# Patient Record
Sex: MALE | Race: BLACK OR AFRICAN AMERICAN | NOT HISPANIC OR LATINO | Employment: OTHER | URBAN - METROPOLITAN AREA
[De-identification: names, ages, dates, MRNs, and addresses within clinical notes are randomized per-mention and may not be internally consistent; named-entity substitution may affect disease eponyms.]

---

## 2017-01-10 RX ORDER — PRAVASTATIN SODIUM 20 MG/1
TABLET ORAL
Qty: 90 TABLET | Refills: 3 | Status: SHIPPED | OUTPATIENT
Start: 2017-01-10 | End: 2018-01-22 | Stop reason: SDUPTHER

## 2017-02-06 ENCOUNTER — LAB VISIT (OUTPATIENT)
Dept: LAB | Facility: OTHER | Age: 82
End: 2017-02-06
Attending: INTERNAL MEDICINE
Payer: MEDICARE

## 2017-02-06 ENCOUNTER — OFFICE VISIT (OUTPATIENT)
Dept: INTERNAL MEDICINE | Facility: CLINIC | Age: 82
End: 2017-02-06
Payer: MEDICARE

## 2017-02-06 VITALS
DIASTOLIC BLOOD PRESSURE: 70 MMHG | HEART RATE: 62 BPM | OXYGEN SATURATION: 98 % | HEIGHT: 65 IN | SYSTOLIC BLOOD PRESSURE: 116 MMHG | WEIGHT: 196.88 LBS | BODY MASS INDEX: 32.8 KG/M2

## 2017-02-06 DIAGNOSIS — R73.03 PREDIABETES: ICD-10-CM

## 2017-02-06 DIAGNOSIS — N18.3 CKD (CHRONIC KIDNEY DISEASE), STAGE 3 (MODERATE): ICD-10-CM

## 2017-02-06 DIAGNOSIS — E78.00 PURE HYPERCHOLESTEROLEMIA: ICD-10-CM

## 2017-02-06 DIAGNOSIS — I10 ESSENTIAL HYPERTENSION: Primary | ICD-10-CM

## 2017-02-06 DIAGNOSIS — I10 ESSENTIAL HYPERTENSION: ICD-10-CM

## 2017-02-06 LAB
ALBUMIN SERPL BCP-MCNC: 3.7 G/DL
ALP SERPL-CCNC: 64 U/L
ALT SERPL W/O P-5'-P-CCNC: 9 U/L
ANION GAP SERPL CALC-SCNC: 7 MMOL/L
AST SERPL-CCNC: 15 U/L
BILIRUB SERPL-MCNC: 1.1 MG/DL
BUN SERPL-MCNC: 41 MG/DL
CALCIUM SERPL-MCNC: 9.4 MG/DL
CHLORIDE SERPL-SCNC: 107 MMOL/L
CHOLEST/HDLC SERPL: 3.5 {RATIO}
CO2 SERPL-SCNC: 26 MMOL/L
CREAT SERPL-MCNC: 2 MG/DL
EST. GFR  (AFRICAN AMERICAN): 34.7 ML/MIN/1.73 M^2
EST. GFR  (NON AFRICAN AMERICAN): 30 ML/MIN/1.73 M^2
GLUCOSE SERPL-MCNC: 77 MG/DL
HDL/CHOLESTEROL RATIO: 28.9 %
HDLC SERPL-MCNC: 142 MG/DL
HDLC SERPL-MCNC: 41 MG/DL
LDLC SERPL CALC-MCNC: 76 MG/DL
NONHDLC SERPL-MCNC: 101 MG/DL
POTASSIUM SERPL-SCNC: 4.6 MMOL/L
PROT SERPL-MCNC: 6.9 G/DL
SODIUM SERPL-SCNC: 140 MMOL/L
TRIGL SERPL-MCNC: 125 MG/DL

## 2017-02-06 PROCEDURE — 1126F AMNT PAIN NOTED NONE PRSNT: CPT | Mod: S$GLB,,, | Performed by: INTERNAL MEDICINE

## 2017-02-06 PROCEDURE — 3074F SYST BP LT 130 MM HG: CPT | Mod: S$GLB,,, | Performed by: INTERNAL MEDICINE

## 2017-02-06 PROCEDURE — 80061 LIPID PANEL: CPT

## 2017-02-06 PROCEDURE — 1157F ADVNC CARE PLAN IN RCRD: CPT | Mod: S$GLB,,, | Performed by: INTERNAL MEDICINE

## 2017-02-06 PROCEDURE — 36415 COLL VENOUS BLD VENIPUNCTURE: CPT

## 2017-02-06 PROCEDURE — 1160F RVW MEDS BY RX/DR IN RCRD: CPT | Mod: S$GLB,,, | Performed by: INTERNAL MEDICINE

## 2017-02-06 PROCEDURE — 99999 PR PBB SHADOW E&M-EST. PATIENT-LVL III: CPT | Mod: PBBFAC,,, | Performed by: INTERNAL MEDICINE

## 2017-02-06 PROCEDURE — 83036 HEMOGLOBIN GLYCOSYLATED A1C: CPT

## 2017-02-06 PROCEDURE — 99214 OFFICE O/P EST MOD 30 MIN: CPT | Mod: S$GLB,,, | Performed by: INTERNAL MEDICINE

## 2017-02-06 PROCEDURE — 3078F DIAST BP <80 MM HG: CPT | Mod: S$GLB,,, | Performed by: INTERNAL MEDICINE

## 2017-02-06 PROCEDURE — 80053 COMPREHEN METABOLIC PANEL: CPT

## 2017-02-06 PROCEDURE — 1159F MED LIST DOCD IN RCRD: CPT | Mod: S$GLB,,, | Performed by: INTERNAL MEDICINE

## 2017-02-06 NOTE — PROGRESS NOTES
"Subjective:       Patient ID: Viktor Townsend Sr. is a 83 y.o. male.    Chief Complaint: Hypertension    HPI Comments:   Pt here for f/u HTN and other issues.    He is feeling well.  No c/o.         Hypertension       Review of Systems   Constitutional: Negative.    HENT: Negative.    Eyes: Negative.    Respiratory: Negative.    Cardiovascular: Negative.    Gastrointestinal: Negative.    Genitourinary: Negative.    Musculoskeletal: Negative.    Skin: Negative.    Neurological: Negative.    Psychiatric/Behavioral: Negative.        Objective:       Vitals:    02/06/17 1134   BP: 116/70   Pulse: 62   SpO2: 98%   Weight: 89.3 kg (196 lb 13.9 oz)   Height: 5' 5" (1.651 m)     Physical Exam   Constitutional: He is oriented to person, place, and time. He appears well-developed and well-nourished. No distress.   HENT:   Head: Normocephalic and atraumatic.   Right Ear: Tympanic membrane, external ear and ear canal normal.   Left Ear: Tympanic membrane, external ear and ear canal normal.   Mouth/Throat: Oropharynx is clear and moist and mucous membranes are normal. No oropharyngeal exudate or posterior oropharyngeal erythema.   Eyes: Conjunctivae and EOM are normal. Pupils are equal, round, and reactive to light.   Neck: Normal range of motion. Neck supple. No thyromegaly present.   Cardiovascular: Normal rate, regular rhythm and normal heart sounds.  Exam reveals no gallop and no friction rub.    No murmur heard.  Pulmonary/Chest: Effort normal and breath sounds normal. No respiratory distress. He has no wheezes. He has no rhonchi. He has no rales.   Lymphadenopathy:     He has no cervical adenopathy.   Neurological: He is alert and oriented to person, place, and time.   Skin: He is not diaphoretic.       Assessment:       1. Essential hypertension    2. Prediabetes    3. Pure hypercholesterolemia    4. CKD (chronic kidney disease), stage 3 (moderate)        Plan:           HTN - At goal.  Cont current tx.    Pre-DM - Chk " new A1c.     HLD - Cont statin and check lipids.    CKD - Chk new Cr, lytes.  Cont ACEI.

## 2017-02-06 NOTE — MR AVS SNAPSHOT
"    [facility]  [address]  [place] [address]  Phone: [phone]  Fax: [phone]                  Viktor Patelsarah Collier   2017 11:40 AM   Office Visit    Description:  Male : 1933   Provider:  Aron Pham MD   Department:  [facility]           Reason for Visit     Hypertension           Diagnoses this Visit        Comments    Essential hypertension    -  Primary     Prediabetes         Pure hypercholesterolemia         CKD (chronic kidney disease), stage 3 (moderate)                To Do List           Goals (5 Years of Data)     None      Follow-Up and Disposition     Return in about 1 year (around 2018), or if symptoms worsen or fail to improve.      [facility]     [facility] On Call Nurse Care Line -  Assistance  Registered nurses in the [facility] provide clinical advisement, health education, appointment booking, and other advisory services.  Call for this free service at [phone].             Medications                Verify that the below list of medications is an accurate representation of the medications you are currently taking.  If none reported, the list may be blank. If incorrect, please contact your healthcare provider. Carry this list with you in case of emergency.           Current Medications     lisinopril-hydrochlorothiazide (PRINZIDE,ZESTORETIC) 20-25 mg Tab TAKE 1 TABLET BY MOUTH EVERY DAY    pravastatin (PRAVACHOL) 20 MG tablet TAKE 1 TABLET BY MOUTH ONCE DAILY           Clinical Reference Information           Your Vitals Were     BP Pulse Height Weight SpO2 BMI    116/70 62 5' 5" (1.651 m) 89.3 kg (196 lb 13.9 oz) 98% 32.76 kg/m2      Blood Pressure          Most Recent Value    BP  116/70      Allergies as of 2017     Oranges [[place]]      Immunizations Administered on Date of Encounter - 2017     None      Orders Placed During Today's Visit     Future Labs/Procedures Expected by " Expires    Comprehensive metabolic panel  2/6/2017 4/7/2018    Hemoglobin A1c  2/6/2017 4/7/2018    Lipid panel  2/6/2017 4/7/2018      [facility] Sign-Up     Activating your [facility] account is as easy as 1-2-3!     1) Visit [email or web address], select Sign Up Now, enter this activation code and your date of birth, then select Next.  [email or web address]  Expires: 3/23/2017 11:53 AM      2) Create a username and password to use when you visit [facility] in the future and select a security question in case you lose your password and select Next.    3) Enter your e-mail address and click Sign Up!    Additional Information  If you have questions, please e-mail [email or web address] or call [phone] to talk to our [facility] staff. Remember, [facility] is NOT to be used for urgent needs. For medical emergencies, dial 911.         Language Assistance Services     ATTENTION: Language assistance services are available, free of charge. Please call [phone].      ATENCIÓN: Si habla español, tiene a becerra disposición servicios gratuitos de asistencia lingüística. Llame al [phone].     CHÚ Ý: N?u b?n nói Ti?ng Vi?t, có các d?ch v? h? tr? ngôn ng? mi?n phí dành cho b?n. G?i s? [phone].         [facility] complies with applicable Federal civil rights laws and does not discriminate on the basis of race, color, national origin, age, disability, or sex.

## 2017-02-07 ENCOUNTER — TELEPHONE (OUTPATIENT)
Dept: INTERNAL MEDICINE | Facility: CLINIC | Age: 82
End: 2017-02-07

## 2017-02-07 DIAGNOSIS — N18.30 CKD (CHRONIC KIDNEY DISEASE) STAGE 3, GFR 30-59 ML/MIN: ICD-10-CM

## 2017-02-07 LAB
ESTIMATED AVG GLUCOSE: 123 MG/DL
HBA1C MFR BLD HPLC: 5.9 %

## 2017-02-07 NOTE — TELEPHONE ENCOUNTER
Patient informed of lab results and told PCP recommend ultrasound. Patient and his wife state they want to wait until after they meet with the oncologist. She also state she wants to check her insurance before having test done.

## 2017-02-23 ENCOUNTER — HOSPITAL ENCOUNTER (OUTPATIENT)
Dept: RADIOLOGY | Facility: OTHER | Age: 82
Discharge: HOME | End: 2017-02-23
Attending: INTERNAL MEDICINE
Payer: MEDICARE

## 2017-02-23 DIAGNOSIS — N18.30 CKD (CHRONIC KIDNEY DISEASE) STAGE 3, GFR 30-59 ML/MIN: ICD-10-CM

## 2017-02-23 PROCEDURE — 76770 US EXAM ABDO BACK WALL COMP: CPT | Mod: TC

## 2017-02-23 PROCEDURE — 76770 US EXAM ABDO BACK WALL COMP: CPT | Mod: 26,,, | Performed by: RADIOLOGY

## 2017-08-07 RX ORDER — LISINOPRIL AND HYDROCHLOROTHIAZIDE 20; 25 MG/1; MG/1
TABLET ORAL
Qty: 90 TABLET | Refills: 0 | Status: SHIPPED | OUTPATIENT
Start: 2017-08-07 | End: 2017-11-10 | Stop reason: SDUPTHER

## 2017-11-10 NOTE — TELEPHONE ENCOUNTER
----- Message from Khushi Chávez sent at 11/10/2017  1:41 PM CST -----  Contact: Patient herself  X  1st Request  _  2nd Request  _  3rd Request    Please refill the medication(s) listed below. Please call the patient when the prescription(s) is ready for  at this phone number   [phone]       Medication #1  LISINOPRIL - HYDROCHLOROTHIAZIDE  20 - 25 mg    Preferred Pharmacy: [facility] # [id number] - [place] - [address] [facility]# [phone]  /   Fax# [phone]

## 2017-11-13 RX ORDER — LISINOPRIL AND HYDROCHLOROTHIAZIDE 20; 25 MG/1; MG/1
1 TABLET ORAL DAILY
Qty: 90 TABLET | Refills: 0 | Status: SHIPPED | OUTPATIENT
Start: 2017-11-13 | End: 2018-01-22 | Stop reason: SDUPTHER

## 2017-12-12 ENCOUNTER — HOSPITAL ENCOUNTER (OUTPATIENT)
Dept: PREADMISSION TESTING | Facility: OTHER | Age: 82
Discharge: HOME | End: 2017-12-12
Attending: SPECIALIST
Payer: MEDICARE

## 2017-12-12 ENCOUNTER — ANESTHESIA EVENT (OUTPATIENT)
Dept: SURGERY | Facility: OTHER | Age: 82
End: 2017-12-12
Payer: MEDICARE

## 2017-12-12 VITALS
TEMPERATURE: 99 F | HEIGHT: 67 IN | HEART RATE: 68 BPM | OXYGEN SATURATION: 98 % | RESPIRATION RATE: 16 BRPM | SYSTOLIC BLOOD PRESSURE: 121 MMHG | WEIGHT: 200 LBS | BODY MASS INDEX: 31.39 KG/M2 | DIASTOLIC BLOOD PRESSURE: 65 MMHG

## 2017-12-12 LAB
ANION GAP SERPL CALC-SCNC: 8 MMOL/L
BUN SERPL-MCNC: 28 MG/DL
CALCIUM SERPL-MCNC: 9.6 MG/DL
CHLORIDE SERPL-SCNC: 106 MMOL/L
CO2 SERPL-SCNC: 25 MMOL/L
CREAT SERPL-MCNC: 1.8 MG/DL
EST. GFR  (AFRICAN AMERICAN): 39 ML/MIN/1.73 M^2
EST. GFR  (NON AFRICAN AMERICAN): 34 ML/MIN/1.73 M^2
GLUCOSE SERPL-MCNC: 99 MG/DL
HCT VFR BLD AUTO: 42 %
HGB BLD-MCNC: 13.5 G/DL
POTASSIUM SERPL-SCNC: 5.2 MMOL/L
SODIUM SERPL-SCNC: 139 MMOL/L

## 2017-12-12 PROCEDURE — 85014 HEMATOCRIT: CPT

## 2017-12-12 PROCEDURE — 36415 COLL VENOUS BLD VENIPUNCTURE: CPT

## 2017-12-12 PROCEDURE — 85018 HEMOGLOBIN: CPT

## 2017-12-12 PROCEDURE — 80048 BASIC METABOLIC PNL TOTAL CA: CPT

## 2017-12-12 RX ORDER — CALCIUM CARBONATE 200(500)MG
1 TABLET,CHEWABLE ORAL 3 TIMES DAILY PRN
COMMUNITY

## 2017-12-12 RX ORDER — SODIUM CHLORIDE, SODIUM LACTATE, POTASSIUM CHLORIDE, CALCIUM CHLORIDE 600; 310; 30; 20 MG/100ML; MG/100ML; MG/100ML; MG/100ML
INJECTION, SOLUTION INTRAVENOUS CONTINUOUS
Status: CANCELLED | OUTPATIENT
Start: 2017-12-12

## 2017-12-12 RX ORDER — LIDOCAINE HYDROCHLORIDE 10 MG/ML
1 INJECTION, SOLUTION EPIDURAL; INFILTRATION; INTRACAUDAL; PERINEURAL ONCE
Status: CANCELLED | OUTPATIENT
Start: 2017-12-12 | End: 2017-12-12

## 2017-12-12 RX ORDER — BISMUTH SUBSALICYLATE 262 MG/1
TABLET ORAL 2 TIMES DAILY PRN
COMMUNITY
End: 2018-04-16

## 2017-12-12 RX ORDER — FAMOTIDINE 20 MG/1
20 TABLET, FILM COATED ORAL
Status: CANCELLED | OUTPATIENT
Start: 2017-12-12 | End: 2017-12-12

## 2017-12-12 NOTE — DISCHARGE INSTRUCTIONS
PRE-ADMIT TESTING -  [phone]    [address]  [place] [facility]          Your surgery has been scheduled at [facility]. We are pleased to have the opportunity to serve you. For Further Information please call [phone].    On the day of surgery please report to the Information Desk on the 1st floor.    · CONTACT YOUR PHYSICIAN'S OFFICE THE DAY PRIOR TO YOUR SURGERY TO OBTAIN YOUR ARRIVAL TIME.     · The evening before surgery do not eat anything after 9 p.m. ( this includes hard candy, chewing gum and mints).  You may only have GATORADE, POWERADE AND WATER  from 9 p.m. until you leave your home.   DO NOT DRINK ANY LIQUIDS ON THE WAY TO THE HOSPITAL.      SPECIAL MEDICATION INSTRUCTIONS: TAKE medications checked off by the Anesthesiologist on your Medication List.    Angiogram Patients: Take medications as instructed by your physician, including aspirin.     Surgery Patients:    If you take ASPIRIN - Your PHYSICIAN/SURGEON will need to inform you IF/OR when you need to stop taking aspirin prior to your surgery.     Do Not take any medications containing IBUPROFEN.  Do Not Wear any make-up or dark nail polish   (especially eye make-up) to surgery. If you come to surgery with makeup on you will be required to remove the makeup or nail polish.    Do not shave your surgical area at least 5 days prior to your surgery. The surgical prep will be performed at the hospital according to Infection Control regulations.    Leave all valuables at home.   Do Not wear any jewelry or watches, including any metal in body piercings.  Contact Lens must be removed before surgery. Either do not wear the contact lens or bring a case and solution for storage.  Please bring a container for eyeglasses or dentures as required.  Bring any paperwork your physician has provided, such as consent forms,  history and physicals, doctor's orders, etc.   Bring comfortable clothes that are loose fitting to wear upon  discharge. Take into consideration the type of surgery being performed.  Maintain your diet as advised per your physician the day prior to surgery.      Adequate rest the night before surgery is advised.   Park in the Parking lot behind the hospital or in the [facility] across the street from the parking lot. Parking is complimentary.  If you will be discharged the same day as your procedure, please arrange for a responsible adult to drive you home or to accompany you if traveling by taxi.   YOU WILL NOT BE PERMITTED TO DRIVE OR TO LEAVE THE HOSPITAL ALONE AFTER SURGERY.   It is strongly recommended that you arrange for someone to remain with you for the first 24 hrs following your surgery.       Thank you for your cooperation.  The Staff of [facility].        Bathing Instructions                                                                 Please shower the evening before and morning of your procedure with    ANTIBACTERIAL SOAP. ( DIAL, etc )  Concentrate on the surgical area   for at least 3 minutes and rinse completely. Dry off as usual.   Do not use any deodorant, powder, body lotions, perfume, after shave or    cologne.

## 2017-12-12 NOTE — ANESTHESIA PREPROCEDURE EVALUATION
12/12/2017  Viktor Townsend Sr. is a 84 y.o., male.    Anesthesia Evaluation    I have reviewed the Patient Summary Reports.    I have reviewed the Nursing Notes.   I have reviewed the Medications.     Review of Systems  Anesthesia Hx:  Denies Family Hx of Anesthesia complications.   Denies Personal Hx of Anesthesia complications.   Social:  Non-Smoker    Hematology/Oncology:  Hematology Normal       -- Cancer in past history (prostate):    EENT/Dental:EENT/Dental Normal   Cardiovascular:   Exercise tolerance: good Hypertension hyperlipidemia    Pulmonary:  Pulmonary Normal    Renal/:   Chronic Renal Disease, CRI    Hepatic/GI:   Hiatal Hernia, GERD (prn otc rx)    Musculoskeletal:   Arthritis     Neurological:  Neurology Normal    Endocrine:  Endocrine Normal Past hx gout   Dermatological:  Skin Normal    Psych:  Psychiatric Normal           Physical Exam  General:  Well nourished    Airway/Jaw/Neck:  Airway Findings: Mouth Opening: Normal Mallampati: I  TM Distance: Normal, at least 6 cm  Jaw/Neck Findings:  Neck ROM: Extension Decreased, Mod.      Dental:  Dental Findings: Upper Dentures, Lower Dentures        Mental Status:  Mental Status Findings:  Cooperative, Alert and Oriented         Anesthesia Plan  Type of Anesthesia, risks & benefits discussed:  Anesthesia Type:  general  Patient's Preference:   Intra-op Monitoring Plan: standard ASA monitors  Intra-op Monitoring Plan Comments:   Post Op Pain Control Plan:   Post Op Pain Control Plan Comments:   Induction:   IV  Beta Blocker:         Informed Consent: Patient understands risks and agrees with Anesthesia plan.  Questions answered. Anesthesia consent signed with patient.  ASA Score: 3     Day of Surgery Review of History & Physical:    H&P update referred to the surgeon.     Anesthesia Plan Notes: Labs ordered today in [facility]        Ready For  Surgery From Anesthesia Perspective.

## 2017-12-14 ENCOUNTER — HOSPITAL ENCOUNTER (OUTPATIENT)
Facility: OTHER | Age: 82
Discharge: HOME | End: 2017-12-14
Attending: SPECIALIST | Admitting: SPECIALIST
Payer: MEDICARE

## 2017-12-14 ENCOUNTER — ANESTHESIA (OUTPATIENT)
Dept: SURGERY | Facility: OTHER | Age: 82
End: 2017-12-14
Payer: MEDICARE

## 2017-12-14 VITALS
BODY MASS INDEX: 31.39 KG/M2 | HEIGHT: 67 IN | SYSTOLIC BLOOD PRESSURE: 121 MMHG | HEART RATE: 67 BPM | WEIGHT: 200 LBS | OXYGEN SATURATION: 98 % | TEMPERATURE: 98 F | DIASTOLIC BLOOD PRESSURE: 76 MMHG | RESPIRATION RATE: 16 BRPM

## 2017-12-14 DIAGNOSIS — K40.90 LEFT INGUINAL HERNIA: Primary | ICD-10-CM

## 2017-12-14 PROCEDURE — 71000033 HC RECOVERY, INTIAL HOUR: Performed by: SPECIALIST

## 2017-12-14 PROCEDURE — 27201423 OPTIME MED/SURG SUP & DEVICES STERILE SUPPLY: Performed by: SPECIALIST

## 2017-12-14 PROCEDURE — 25000003 PHARM REV CODE 250: Performed by: ANESTHESIOLOGY

## 2017-12-14 PROCEDURE — C9290 INJ, BUPIVACAINE LIPOSOME: HCPCS | Performed by: SPECIALIST

## 2017-12-14 PROCEDURE — 36000706: Performed by: SPECIALIST

## 2017-12-14 PROCEDURE — 37000008 HC ANESTHESIA 1ST 15 MINUTES: Performed by: SPECIALIST

## 2017-12-14 PROCEDURE — 88304 TISSUE EXAM BY PATHOLOGIST: CPT | Performed by: PATHOLOGY

## 2017-12-14 PROCEDURE — 37000009 HC ANESTHESIA EA ADD 15 MINS: Performed by: SPECIALIST

## 2017-12-14 PROCEDURE — 25000003 PHARM REV CODE 250: Performed by: SPECIALIST

## 2017-12-14 PROCEDURE — 25000003 PHARM REV CODE 250: Performed by: NURSE ANESTHETIST, CERTIFIED REGISTERED

## 2017-12-14 PROCEDURE — C1781 MESH (IMPLANTABLE): HCPCS | Performed by: SPECIALIST

## 2017-12-14 PROCEDURE — 63600175 PHARM REV CODE 636 W HCPCS: Performed by: SPECIALIST

## 2017-12-14 PROCEDURE — 88304 TISSUE EXAM BY PATHOLOGIST: CPT | Mod: 26,,, | Performed by: PATHOLOGY

## 2017-12-14 PROCEDURE — 36000707: Performed by: SPECIALIST

## 2017-12-14 PROCEDURE — 63600175 PHARM REV CODE 636 W HCPCS: Performed by: NURSE ANESTHETIST, CERTIFIED REGISTERED

## 2017-12-14 PROCEDURE — 71000016 HC POSTOP RECOV ADDL HR: Performed by: SPECIALIST

## 2017-12-14 PROCEDURE — 71000015 HC POSTOP RECOV 1ST HR: Performed by: SPECIALIST

## 2017-12-14 DEVICE — MESH SOFT PROLENE WVN 3X6: Type: IMPLANTABLE DEVICE | Site: GROIN | Status: FUNCTIONAL

## 2017-12-14 RX ORDER — OXYCODONE HYDROCHLORIDE 5 MG/1
5 TABLET ORAL EVERY 4 HOURS PRN
Status: DISCONTINUED | OUTPATIENT
Start: 2017-12-14 | End: 2017-12-14 | Stop reason: HOSPADM

## 2017-12-14 RX ORDER — RAMELTEON 8 MG/1
8 TABLET ORAL NIGHTLY PRN
Status: DISCONTINUED | OUTPATIENT
Start: 2017-12-14 | End: 2017-12-14

## 2017-12-14 RX ORDER — HYDROCHLOROTHIAZIDE 25 MG/1
25 TABLET ORAL DAILY
Status: DISCONTINUED | OUTPATIENT
Start: 2017-12-14 | End: 2017-12-14 | Stop reason: HOSPADM

## 2017-12-14 RX ORDER — ACETAMINOPHEN 325 MG/1
650 TABLET ORAL EVERY 4 HOURS PRN
Status: DISCONTINUED | OUTPATIENT
Start: 2017-12-14 | End: 2017-12-14 | Stop reason: HOSPADM

## 2017-12-14 RX ORDER — PROPOFOL 10 MG/ML
VIAL (ML) INTRAVENOUS
Status: DISCONTINUED | OUTPATIENT
Start: 2017-12-14 | End: 2017-12-14

## 2017-12-14 RX ORDER — RAMELTEON 8 MG/1
8 TABLET ORAL NIGHTLY PRN
Status: DISCONTINUED | OUTPATIENT
Start: 2017-12-14 | End: 2017-12-14 | Stop reason: HOSPADM

## 2017-12-14 RX ORDER — FENTANYL CITRATE 50 UG/ML
25 INJECTION, SOLUTION INTRAMUSCULAR; INTRAVENOUS EVERY 5 MIN PRN
Status: DISCONTINUED | OUTPATIENT
Start: 2017-12-14 | End: 2017-12-14 | Stop reason: HOSPADM

## 2017-12-14 RX ORDER — ONDANSETRON 2 MG/ML
4 INJECTION INTRAMUSCULAR; INTRAVENOUS DAILY PRN
Status: DISCONTINUED | OUTPATIENT
Start: 2017-12-14 | End: 2017-12-14 | Stop reason: HOSPADM

## 2017-12-14 RX ORDER — ONDANSETRON 8 MG/1
8 TABLET, ORALLY DISINTEGRATING ORAL EVERY 8 HOURS PRN
Status: DISCONTINUED | OUTPATIENT
Start: 2017-12-14 | End: 2017-12-14

## 2017-12-14 RX ORDER — HYDROMORPHONE HYDROCHLORIDE 2 MG/ML
0.5 INJECTION, SOLUTION INTRAMUSCULAR; INTRAVENOUS; SUBCUTANEOUS
Status: DISCONTINUED | OUTPATIENT
Start: 2017-12-14 | End: 2017-12-14 | Stop reason: HOSPADM

## 2017-12-14 RX ORDER — HYDROMORPHONE HYDROCHLORIDE 2 MG/ML
0.5 INJECTION, SOLUTION INTRAMUSCULAR; INTRAVENOUS; SUBCUTANEOUS EVERY 4 HOURS PRN
Status: DISCONTINUED | OUTPATIENT
Start: 2017-12-14 | End: 2017-12-14 | Stop reason: HOSPADM

## 2017-12-14 RX ORDER — LIDOCAINE HCL/PF 100 MG/5ML
SYRINGE (ML) INTRAVENOUS
Status: DISCONTINUED | OUTPATIENT
Start: 2017-12-14 | End: 2017-12-14

## 2017-12-14 RX ORDER — DIPHENHYDRAMINE HYDROCHLORIDE 50 MG/ML
25 INJECTION INTRAMUSCULAR; INTRAVENOUS EVERY 4 HOURS PRN
Status: DISCONTINUED | OUTPATIENT
Start: 2017-12-14 | End: 2017-12-14 | Stop reason: HOSPADM

## 2017-12-14 RX ORDER — ACETAMINOPHEN 325 MG/1
650 TABLET ORAL EVERY 8 HOURS PRN
Status: DISCONTINUED | OUTPATIENT
Start: 2017-12-14 | End: 2017-12-14

## 2017-12-14 RX ORDER — DEXAMETHASONE SODIUM PHOSPHATE 4 MG/ML
INJECTION, SOLUTION INTRA-ARTICULAR; INTRALESIONAL; INTRAMUSCULAR; INTRAVENOUS; SOFT TISSUE
Status: DISCONTINUED | OUTPATIENT
Start: 2017-12-14 | End: 2017-12-14

## 2017-12-14 RX ORDER — SUCCINYLCHOLINE CHLORIDE 20 MG/ML
INJECTION INTRAMUSCULAR; INTRAVENOUS
Status: DISCONTINUED | OUTPATIENT
Start: 2017-12-14 | End: 2017-12-14

## 2017-12-14 RX ORDER — HYDROMORPHONE HYDROCHLORIDE 2 MG/ML
0.4 INJECTION, SOLUTION INTRAMUSCULAR; INTRAVENOUS; SUBCUTANEOUS EVERY 5 MIN PRN
Status: DISCONTINUED | OUTPATIENT
Start: 2017-12-14 | End: 2017-12-14 | Stop reason: HOSPADM

## 2017-12-14 RX ORDER — OXYCODONE HYDROCHLORIDE 5 MG/1
5 TABLET ORAL
Status: DISCONTINUED | OUTPATIENT
Start: 2017-12-14 | End: 2017-12-14 | Stop reason: HOSPADM

## 2017-12-14 RX ORDER — LIDOCAINE HYDROCHLORIDE 10 MG/ML
1 INJECTION, SOLUTION EPIDURAL; INFILTRATION; INTRACAUDAL; PERINEURAL ONCE
Status: DISCONTINUED | OUTPATIENT
Start: 2017-12-14 | End: 2017-12-14

## 2017-12-14 RX ORDER — SODIUM CHLORIDE, SODIUM LACTATE, POTASSIUM CHLORIDE, CALCIUM CHLORIDE 600; 310; 30; 20 MG/100ML; MG/100ML; MG/100ML; MG/100ML
INJECTION, SOLUTION INTRAVENOUS CONTINUOUS
Status: DISCONTINUED | OUTPATIENT
Start: 2017-12-14 | End: 2017-12-14

## 2017-12-14 RX ORDER — MIDAZOLAM HYDROCHLORIDE 1 MG/ML
INJECTION INTRAMUSCULAR; INTRAVENOUS
Status: DISCONTINUED | OUTPATIENT
Start: 2017-12-14 | End: 2017-12-14

## 2017-12-14 RX ORDER — ROCURONIUM BROMIDE 10 MG/ML
INJECTION, SOLUTION INTRAVENOUS
Status: DISCONTINUED | OUTPATIENT
Start: 2017-12-14 | End: 2017-12-14

## 2017-12-14 RX ORDER — ACETAMINOPHEN 325 MG/1
650 TABLET ORAL EVERY 8 HOURS PRN
Status: DISCONTINUED | OUTPATIENT
Start: 2017-12-14 | End: 2017-12-14 | Stop reason: HOSPADM

## 2017-12-14 RX ORDER — ACETAMINOPHEN 325 MG/1
325 TABLET ORAL EVERY 6 HOURS PRN
COMMUNITY

## 2017-12-14 RX ORDER — SODIUM CHLORIDE 0.9 % (FLUSH) 0.9 %
3 SYRINGE (ML) INJECTION
Status: DISCONTINUED | OUTPATIENT
Start: 2017-12-14 | End: 2017-12-14 | Stop reason: HOSPADM

## 2017-12-14 RX ORDER — GLYCOPYRROLATE 0.2 MG/ML
INJECTION INTRAMUSCULAR; INTRAVENOUS
Status: DISCONTINUED | OUTPATIENT
Start: 2017-12-14 | End: 2017-12-14

## 2017-12-14 RX ORDER — SODIUM CHLORIDE 0.9 % (FLUSH) 0.9 %
3 SYRINGE (ML) INJECTION EVERY 8 HOURS
Status: DISCONTINUED | OUTPATIENT
Start: 2017-12-14 | End: 2017-12-14

## 2017-12-14 RX ORDER — LISINOPRIL 20 MG/1
20 TABLET ORAL DAILY
Status: DISCONTINUED | OUTPATIENT
Start: 2017-12-14 | End: 2017-12-14 | Stop reason: HOSPADM

## 2017-12-14 RX ORDER — NEOSTIGMINE METHYLSULFATE 1 MG/ML
INJECTION, SOLUTION INTRAVENOUS
Status: DISCONTINUED | OUTPATIENT
Start: 2017-12-14 | End: 2017-12-14

## 2017-12-14 RX ORDER — DIPHENHYDRAMINE HYDROCHLORIDE 50 MG/ML
25 INJECTION INTRAMUSCULAR; INTRAVENOUS EVERY 4 HOURS PRN
Status: DISCONTINUED | OUTPATIENT
Start: 2017-12-14 | End: 2017-12-14

## 2017-12-14 RX ORDER — ACETAMINOPHEN 10 MG/ML
INJECTION, SOLUTION INTRAVENOUS
Status: DISCONTINUED | OUTPATIENT
Start: 2017-12-14 | End: 2017-12-14

## 2017-12-14 RX ORDER — OXYCODONE AND ACETAMINOPHEN 7.5; 325 MG/1; MG/1
1 TABLET ORAL EVERY 4 HOURS PRN
Qty: 40 TABLET | Refills: 0 | Status: SHIPPED | OUTPATIENT
Start: 2017-12-14 | End: 2018-04-16

## 2017-12-14 RX ORDER — CEFAZOLIN SODIUM 2 G/50ML
2 SOLUTION INTRAVENOUS
Status: DISCONTINUED | OUTPATIENT
Start: 2017-12-14 | End: 2017-12-14

## 2017-12-14 RX ORDER — FAMOTIDINE 20 MG/1
20 TABLET, FILM COATED ORAL
Status: COMPLETED | OUTPATIENT
Start: 2017-12-14 | End: 2017-12-14

## 2017-12-14 RX ORDER — FENTANYL CITRATE 50 UG/ML
INJECTION, SOLUTION INTRAMUSCULAR; INTRAVENOUS
Status: DISCONTINUED | OUTPATIENT
Start: 2017-12-14 | End: 2017-12-14

## 2017-12-14 RX ORDER — ATROPINE SULFATE 1 MG/ML
INJECTION, SOLUTION INTRAMUSCULAR; INTRAVENOUS; SUBCUTANEOUS
Status: DISCONTINUED | OUTPATIENT
Start: 2017-12-14 | End: 2017-12-14

## 2017-12-14 RX ORDER — MEPERIDINE HYDROCHLORIDE 50 MG/ML
12.5 INJECTION INTRAMUSCULAR; INTRAVENOUS; SUBCUTANEOUS ONCE AS NEEDED
Status: DISCONTINUED | OUTPATIENT
Start: 2017-12-14 | End: 2017-12-14 | Stop reason: HOSPADM

## 2017-12-14 RX ORDER — ONDANSETRON 2 MG/ML
4 INJECTION INTRAMUSCULAR; INTRAVENOUS EVERY 8 HOURS PRN
Status: DISCONTINUED | OUTPATIENT
Start: 2017-12-14 | End: 2017-12-14 | Stop reason: HOSPADM

## 2017-12-14 RX ORDER — SODIUM CHLORIDE 0.9 % (FLUSH) 0.9 %
3 SYRINGE (ML) INJECTION EVERY 8 HOURS
Status: DISCONTINUED | OUTPATIENT
Start: 2017-12-14 | End: 2017-12-14 | Stop reason: HOSPADM

## 2017-12-14 RX ORDER — ONDANSETRON 8 MG/1
8 TABLET, ORALLY DISINTEGRATING ORAL EVERY 8 HOURS PRN
Status: DISCONTINUED | OUTPATIENT
Start: 2017-12-14 | End: 2017-12-14 | Stop reason: HOSPADM

## 2017-12-14 RX ORDER — OXYCODONE HYDROCHLORIDE 5 MG/1
5 TABLET ORAL EVERY 4 HOURS PRN
Status: DISCONTINUED | OUTPATIENT
Start: 2017-12-14 | End: 2017-12-14 | Stop reason: SDUPTHER

## 2017-12-14 RX ORDER — BUPIVACAINE HCL/EPINEPHRINE 0.25-.0005
VIAL (ML) INJECTION
Status: DISCONTINUED | OUTPATIENT
Start: 2017-12-14 | End: 2017-12-14 | Stop reason: HOSPADM

## 2017-12-14 RX ADMIN — GLYCOPYRROLATE 0.6 MG: 0.2 INJECTION, SOLUTION INTRAMUSCULAR; INTRAVENOUS at 01:12

## 2017-12-14 RX ADMIN — PROPOFOL 150 MG: 10 INJECTION, EMULSION INTRAVENOUS at 11:12

## 2017-12-14 RX ADMIN — OXYCODONE HYDROCHLORIDE 5 MG: 5 TABLET ORAL at 01:12

## 2017-12-14 RX ADMIN — NEOSTIGMINE METHYLSULFATE 4 MG: 1 INJECTION INTRAVENOUS at 01:12

## 2017-12-14 RX ADMIN — CARBOXYMETHYLCELLULOSE SODIUM 2 DROP: 2.5 SOLUTION/ DROPS OPHTHALMIC at 11:12

## 2017-12-14 RX ADMIN — ROCURONIUM BROMIDE 5 MG: 10 INJECTION, SOLUTION INTRAVENOUS at 11:12

## 2017-12-14 RX ADMIN — CEFAZOLIN SODIUM 2 G: 2 SOLUTION INTRAVENOUS at 12:12

## 2017-12-14 RX ADMIN — ROCURONIUM BROMIDE 20 MG: 10 INJECTION, SOLUTION INTRAVENOUS at 12:12

## 2017-12-14 RX ADMIN — SUCCINYLCHOLINE CHLORIDE 120 MG: 20 INJECTION, SOLUTION INTRAMUSCULAR; INTRAVENOUS at 11:12

## 2017-12-14 RX ADMIN — LIDOCAINE HYDROCHLORIDE 75 MG: 20 INJECTION, SOLUTION INTRAVENOUS at 11:12

## 2017-12-14 RX ADMIN — DEXAMETHASONE SODIUM PHOSPHATE 4 MG: 4 INJECTION, SOLUTION INTRAMUSCULAR; INTRAVENOUS at 12:12

## 2017-12-14 RX ADMIN — ACETAMINOPHEN 1000 MG: 10 INJECTION, SOLUTION INTRAVENOUS at 12:12

## 2017-12-14 RX ADMIN — FAMOTIDINE 20 MG: 20 TABLET, FILM COATED ORAL at 08:12

## 2017-12-14 RX ADMIN — ATROPINE SULFATE 0.4 MG: 1 INJECTION, SOLUTION INTRAMUSCULAR; INTRAVENOUS; SUBCUTANEOUS at 12:12

## 2017-12-14 RX ADMIN — FENTANYL CITRATE 50 MCG: 50 INJECTION, SOLUTION INTRAMUSCULAR; INTRAVENOUS at 11:12

## 2017-12-14 RX ADMIN — FENTANYL CITRATE 50 MCG: 50 INJECTION, SOLUTION INTRAMUSCULAR; INTRAVENOUS at 01:12

## 2017-12-14 RX ADMIN — EPHEDRINE SULFATE 10 MG: 50 INJECTION INTRAMUSCULAR; INTRAVENOUS; SUBCUTANEOUS at 12:12

## 2017-12-14 RX ADMIN — MIDAZOLAM HYDROCHLORIDE 1 MG: 1 INJECTION, SOLUTION INTRAMUSCULAR; INTRAVENOUS at 11:12

## 2017-12-14 RX ADMIN — SODIUM CHLORIDE, SODIUM LACTATE, POTASSIUM CHLORIDE, AND CALCIUM CHLORIDE: 600; 310; 30; 20 INJECTION, SOLUTION INTRAVENOUS at 10:12

## 2017-12-14 NOTE — BRIEF OP NOTE
[facility]  Brief Operative Note     SUMMARY     Surgery Date: 12/14/2017     Surgeon(s) and Role:     * Avery Melton Jr., MD - Primary    Assisting Surgeon: None    Pre-op Diagnosis:  Hernia, inguinal, left [K40.90]    Post-op Diagnosis:  Post-Op Diagnosis Codes:     * Hernia, inguinal, left [K40.90]    Procedure(s) (LRB):  REPAIR-HERNIA-INGUINAL-INITIAL (5 YRS+) (Left)    Anesthesia: General    Description of the findings of the procedure: sliding LIH/colon    Findings/Key Components: above, + lipoma of cord    Estimated Blood Loss: min         Specimens:   Specimen (12h ago through future)    Start     Ordered    12/14/17 1233  Specimen to Pathology - Surgery  Once     Comments:  Left inguinal cord lipoma      12/14/17 1233          Discharge Note    SUMMARY     Admit Date: 12/14/2017    Discharge Date and Time:  12/14/2017 1:02 PM    Hospital Course (synopsis of major diagnoses, care, treatment, and services provided during the course of the hospital stay): benign     Final Diagnosis: Post-Op Diagnosis Codes:     * Hernia, inguinal, left [K40.90]    Disposition: Home or Self Care    Follow Up/Patient Instructions:     Medications:  Reconciled Home Medications:   Current Discharge Medication List      START taking these medications    Details   oxyCODONE-acetaminophen (PERCOCET) 7.5-325 mg per tablet Take 1 tablet by mouth every 4 (four) hours as needed for Pain.  Qty: 40 tablet, Refills: 0         CONTINUE these medications which have NOT CHANGED    Details   acetaminophen (TYLENOL) 325 MG tablet Take 325 mg by mouth every 6 (six) hours as needed for Pain.      bismuth subsalicylate (BISMUTH) 262 mg Chew Take by mouth 2 (two) times daily as needed.      calcium carbonate (TUMS) 200 mg calcium (500 mg) chewable tablet Take 1 tablet by mouth 3 (three) times daily as needed for Heartburn.      lisinopril-hydrochlorothiazide (PRINZIDE,ZESTORETIC) 20-25 mg Tab Take 1 tablet by mouth once  daily.  Qty: 90 tablet, Refills: 0      pravastatin (PRAVACHOL) 20 MG tablet TAKE 1 TABLET BY MOUTH ONCE DAILY  Qty: 90 tablet, Refills: 3             Discharge Procedure Orders  Diet general     Activity as tolerated     Call MD for:  persistent nausea and vomiting     Call MD for:  severe uncontrolled pain     Call MD for:  redness, tenderness, or signs of infection (pain, swelling, redness, odor or green/yellow discharge around incision site)       Follow-up Information     Follow up In 10 days.

## 2017-12-14 NOTE — H&P (VIEW-ONLY)
"History & Physical    SUBJECTIVE:     History of Present Illness:  Patient is a 84 y.o. male with symptomatic reducible left inguinal hernia.       Review of patient's allergies indicates:   Allergen Reactions    Oranges [orange] Shortness Of Breath       Current Outpatient Prescriptions   Medication Sig Dispense Refill    lisinopril-hydrochlorothiazide (PRINZIDE,ZESTORETIC) 20-25 mg Tab Take 1 tablet by mouth once daily. 90 tablet 0    pravastatin (PRAVACHOL) 20 MG tablet TAKE 1 TABLET BY MOUTH ONCE DAILY 90 tablet 3     No current facility-administered medications for this visit.        Past Medical History:   Diagnosis Date    Hypertension      Past Surgical History:   Procedure Laterality Date    CHOLECYSTECTOMY      PROSTATE SURGERY       Family History   Problem Relation Age of Onset    Hyperlipidemia Father     Hypertension Father     Diabetes Father      Social History   Substance Use Topics    Smoking status: Former Smoker    Smokeless tobacco: Not on file    Alcohol use No        Review of Systems:  Review of Systems   HENT: Negative.    Respiratory: Negative.    Cardiovascular: Negative.    Gastrointestinal: Negative.    Genitourinary: Negative.    Musculoskeletal: Negative.    Skin: Negative.        OBJECTIVE:     Vital Signs (Most Recent)  Pulse: 66 (12/06/17 1422)  BP: (!) 143/71 (12/06/17 1422)  5' 7" (1.702 m)  90.7 kg (200 lb)     Physical Exam:  Physical Exam   Constitutional: He is oriented to person, place, and time. He appears well-developed and well-nourished.   HENT:   Head: Normocephalic and atraumatic.   Eyes: EOM are normal.   Neck: Normal range of motion. Neck supple.   Cardiovascular: Normal rate, regular rhythm and normal heart sounds.    Pulmonary/Chest: Effort normal and breath sounds normal.   Abdominal: Soft. Bowel sounds are normal. A hernia is present. Hernia confirmed positive in the left inguinal area.   Reducible left inguinal hernia    Musculoskeletal: Normal range " of motion.   Neurological: He is alert and oriented to person, place, and time.   Skin: Skin is warm and dry.       Laboratory      Diagnostic Results:      ASSESSMENT/PLAN:     Left inguinal hernia     PLAN:Plan     Repair

## 2017-12-14 NOTE — PLAN OF CARE
Viktor Townsend Sr. has met all discharge criteria from Phase II. Vital Signs are stable, ambulating  without difficulty. Discharge instructions given, patient verbalized understanding. Discharged from facility via wheelchair in stable condition.

## 2017-12-14 NOTE — DISCHARGE INSTRUCTIONS
Anesthesia: After Your Surgery  Youve just had surgery. During surgery, you received medication called anesthesia to keep you comfortable and pain-free. After surgery, you may experience some pain or nausea. This is common. Here are some tips for feeling better and recovering after surgery.    Going home  Your doctor or nurse will show you how to take care of yourself when you go home. He or she will also answer your questions. Have an adult family member or friend drive you home. For the first 24 hours after your surgery:  · Do not drive or use heavy equipment.  · Do not make important decisions or sign legal documents.  · Avoid alcohol.  · Have someone stay with you, if needed. He or she can watch for problems and help keep you safe.  Be sure to keep all follow-up appointments with your doctor. And rest after your procedure for as long as your doctor tells you to.    Coping with pain  If you have pain after surgery, pain medication will help you feel better. Take it as directed, before pain becomes severe. Also, ask your doctor or pharmacist about other ways to control pain, such as with heat, ice, and relaxation. And follow any other instructions your surgeon or nurse gives you.    Tips for taking pain medication  To get the best relief possible, remember these points:  · Pain medications can upset your stomach. Taking them with a little food may help.  · Most pain relievers taken by mouth need at least 20 to 30 minutes to take effect.  · Taking medication on a schedule can help you remember to take it. Try to time your medication so that you can take it before beginning an activity, such as dressing, walking, or sitting down for dinner.  · Constipation is a common side effect of pain medications. Contact your doctor before taking any medications like laxatives or stool softeners to help relieve constipation. Also ask about any dietary restrictions, because drinking lots of fluids and eating foods like fruits  and vegetables that are high in fiber can also help. Remember, dont take laxatives unless your surgeon has prescribed them.  · Mixing alcohol and pain medication can cause dizziness and slow your breathing. It can even be fatal. Dont drink alcohol while taking pain medication.  · Pain medication can slow your reflexes. Dont drive or operate machinery while taking pain medication.  If your health care provider tells you to take acetaminophen to help relieve your pain, ask him or her how much you are supposed to take each day. (Acetaminophen is the generic name for Tylenol and other brand-name pain relievers.) Acetaminophen or other pain relievers may interact with your prescription medicines or other over-the-counter (OTC) drugs. Some prescription medications contain acetaminophen along with other active ingredients. Using both prescription and OTC acetaminophen for pain can cause you to overdose. The FDA recommends that you read the labels on your OTC medications carefully. This will help you to clearly understand the list of active ingredients, dosing instructions, and any warnings. It may also help you avoid taking too much acetaminophen. If you have questions or don't understand the information, ask your pharmacist or health care provider to explain it to you before you take the OTC medication.    Managing nausea  Some people have an upset stomach after surgery. This is often due to anesthesia, pain, pain medications, or the stress of surgery. The following tips will help you manage nausea and get good nutrition as you recover. If you were on a special diet before surgery, ask your doctor if you should follow it during recovery. These tips may help:  · Dont push yourself to eat. Your body will tell you when to eat and how much.  · Start off with clear liquids and soup. They are easier to digest.  · Progress to semi-solid foods (mashed potatoes, applesauce, and gelatin) as you feel ready.  · Slowly move to  solid foods. Dont eat fatty, rich, or spicy foods at first.  · Dont force yourself to have three large meals a day. Instead, eat smaller amounts more often.  · Take pain medications with a small amount of solid food, such as crackers or toast to avoid nausea.      Call your surgeon if  · You still have pain an hour after taking medication (it may not be strong enough).  · You feel too sleepy, dizzy, or groggy (medication may be too strong).  · You have side effects like nausea, vomiting, or skin changes (rash, itching, or hives).   © 9415-6433 [employer]. [address] [place] [address], [place] [address]. All rights reserved. This information is not intended as a substitute for professional medical care. Always follow your healthcare professional's instructions.                  Discharge Instructions for Open Hernia Repair  You had a procedure called open hernia repair. Also called a rupture, a hernia is a tear or weakness in the wall of the belly. This weakness may be present at birth. Or it can be caused by the wear and tear of daily living. Hernias may get worse with time or with physical stress. But surgery can help repair the weakness and eliminate symptoms.  Activity after surgery  Recommendations include the following:  · After surgery, take it easy for the rest of the day. If you had general anesthesia, dont use machinery or power tools, drink alcohol, or make any major decisions for at least the first 24 hours.  · Dont drive while you are still taking opioid pain medicine, and dont drive until you are able to step firmly on the brake pedal without hesitation.  · Ask others to help with chores and errands while you recover.  · Dont lift anything heavier than 10 pounds until your healthcare provider says its OK.  · Dont mow the lawn, use a vacuum , or do other strenuous activities until your healthcare provider says its OK.  · Walk as often as you feel able.  · Continue the coughing  and deep breathing exercises that you learned in the hospital.  · Ask your healthcare provider when you can expect to return to work.  · Avoid constipation:  ¨ Eat fruits, vegetables, and whole grains.  ¨ Drink 6 to 8 glasses of water a day, unless otherwise instructed.  ¨ Use a laxative or a mild stool softener as instructed by your healthcare provider.  Bandage and incision care  Tips include the following:  · Do not get the bandage or wound wet for 48 hours.  · If strips of tape were used to close your incision, dont pull them off. Let them fall off on their own.  · Remove any gauze bandage in 48 hours.  · Wash your incision with mild soap and water. Pat it dry. Dont use oils, powders, or lotions on your incision. Do not soak your incision or take tub baths until cleared by your healthcare provider.  Follow-up care  Keep follow-up appointments during your recovery. These allow your healthcare provider to check your progress and make sure youre healing well. You may also need to have your stitches, staples, or bandage removed. During office visits, tell your healthcare provider if you have any new symptoms. And be sure to ask any questions you have.     When to call your healthcare provider  Call your healthcare provider immediately if you have any of the following:  · A large amount of swelling or bruising (some testicular swelling and bruising is common)  · Bleeding  · Increasing pain  · Increased redness or drainage of the incision  · Fever of 100.4°F (38.0°C) or higher, or as directed by your healthcare provider  · Trouble urinating  · Nausea or vomiting   Date Last Reviewed: 7/1/2016  © 5852-0624 [employer]. [address] [place] [address], [place] [address]. All rights reserved. This information is not intended as a substitute for professional medical care. Always follow your healthcare professional's instructions.

## 2017-12-14 NOTE — PROGRESS NOTES
[facility]  Brief Operative Note    SUMMARY     Surgery Date: 12/14/2017     Surgeon(s) and Role:     * Avery Melton Jr., MD - Primary    Assisting Surgeon: None    Pre-op Diagnosis:  Hernia, inguinal, left [K40.90]    Post-op Diagnosis:  Post-Op Diagnosis Codes:     * Hernia, inguinal, left [K40.90]    Procedure(s) (LRB):  REPAIR-HERNIA-INGUINAL-INITIAL (5 YRS+) (Left)    Anesthesia: General    Description of Procedure: lap jennifer    Description of the findings of the procedure: chronic cholecystitis    Estimated Blood Loss: 100cc         Specimens:   Specimen (12h ago through future)    None

## 2017-12-14 NOTE — ANESTHESIA POSTPROCEDURE EVALUATION
"Anesthesia Post Evaluation    Patient: Viktor Townsend .    Procedure(s) Performed: Procedure(s) (LRB):  REPAIR-HERNIA-INGUINAL-INITIAL (5 YRS+) (Left)    Final Anesthesia Type: general  Patient location during evaluation: PACU  Patient participation: Yes- Able to Participate  Level of consciousness: awake and alert  Post-procedure vital signs: reviewed and stable  Pain management: adequate  Airway patency: patent  PONV status at discharge: No PONV  Anesthetic complications: no      Cardiovascular status: blood pressure returned to baseline  Respiratory status: unassisted, spontaneous ventilation and room air  Hydration status: euvolemic  Follow-up not needed.        Visit Vitals  /73 (BP Location: Right arm, Patient Position: Lying)   Pulse 65   Temp 36.7 °C (98.1 °F) (Oral)   Resp 16   Ht 5' 7" (1.702 m)   Wt 90.7 kg (200 lb)   SpO2 98%   BMI 31.32 kg/m²       Pain/Anu Score: Pain Assessment Performed: Yes (12/14/2017  2:17 PM)  Presence of Pain: complains of pain/discomfort (12/14/2017  2:17 PM)  Pain Rating Prior to Med Admin: 3 (12/14/2017  1:46 PM)  Anu Score: 10 (12/14/2017  2:17 PM)  Modified Anu Score: 20 (12/14/2017  1:45 PM)      "

## 2017-12-14 NOTE — INTERVAL H&P NOTE
The patient has been examined and the H&P has been reviewed:    I concur with the findings and no changes have occurred since H&P was written.              Active Hospital Problems    Diagnosis  POA    Left inguinal hernia [K40.90]  Yes      Resolved Hospital Problems    Diagnosis Date Resolved POA   No resolved problems to display.

## 2017-12-15 NOTE — OP NOTE
DATE OF PROCEDURE:  12/14/2017.    PREOPERATIVE DIAGNOSIS:  Left inguinal hernia.    POSTOPERATIVE DIAGNOSIS:  Sliding left inguinal hernia, lipoma of cord.    PROCEDURE:  Open repair of sliding left inguinal hernia with removal of lipoma   of the cord.    PROCEDURE IN DETAIL:  The patient was brought to the Operating Room, placed in   the supine position.  The abdomen and groin were prepped and draped in a sterile   fashion.  A transverse incision was made over the inguinal canal, carried down   through the subcutaneous tissues.  Fascia of the external oblique opened along   the bias of the fibers.  Spermatic cord was identified and isolated.  There was   a large lipoma present on the cord.  Using blunt dissection, these were freed   from surrounding structures.  A high ligation was performed with 3-0 Vicryl.    The specimen was removed and sent for permanent section.  There was a sac   present along the cord making a part of the sac was the sigmoid colon.  The sac   was opened and then imbricated with 3-0 Vicryl and returned into the peritoneal   cavity.  The patient examined and had no evidence of femoral hernia.  A piece of   soft Prolene mesh cut to an appropriate size and configuration, sutured along   the iliopubic tract laterally and the conjoint tendon medially with 0 Ethibond   sutures.  The mesh was key-holed around the spermatic cord.  After completion of   reconstruction of floor of the inguinal canal, the cord was returned to its   normal anatomic position.  The external oblique was closed with interrupted 3-0   Vicryl, the subQ with interrupted 3-0 Vicryl, the skin with running 4-0   Monocryl.  The patient tolerated the procedure well and left the Operating Room   in good condition.  At the end of procedure, all sponge, lap and instrument   counts were correct.      LINDY  dd: 12/14/2017 13:05:06 (CST)  td: 12/14/2017 19:35:51 (CST)  Doc ID   #[id number]  Job ID #[id number]    CC:

## 2018-01-15 ENCOUNTER — TELEPHONE (OUTPATIENT)
Dept: INTERNAL MEDICINE | Facility: CLINIC | Age: 83
End: 2018-01-15

## 2018-01-15 NOTE — TELEPHONE ENCOUNTER
Pt and [email or web address] schedule for a NP appt on 01/22/ Pt wife agrees and does not have any further questions.

## 2018-01-15 NOTE — TELEPHONE ENCOUNTER
----- Message from Maria Samayoa sent at 1/15/2018 11:15 AM CST -----      _  1st Request  _  2nd Request  _  3rd Request    Please refill the medication(s) listed below. Please call the patient when the prescription(s) is ready for  at this phone number            Medication #1pravastatin (PRAVACHOL) 20 MG tablet    Pt has an appt with you on 2/6/18 Dr Vero orr and thought he had enough to make it to his appt    Preferred Pharmacy:  [facility] [address] [place] [address]

## 2018-01-22 ENCOUNTER — TELEPHONE (OUTPATIENT)
Dept: ORTHOPEDICS | Facility: CLINIC | Age: 83
End: 2018-01-22

## 2018-01-22 ENCOUNTER — TELEPHONE (OUTPATIENT)
Dept: OTOLARYNGOLOGY | Facility: CLINIC | Age: 83
End: 2018-01-22

## 2018-01-22 ENCOUNTER — OFFICE VISIT (OUTPATIENT)
Dept: INTERNAL MEDICINE | Facility: CLINIC | Age: 83
End: 2018-01-22
Attending: FAMILY MEDICINE
Payer: MEDICARE

## 2018-01-22 VITALS
BODY MASS INDEX: 30.56 KG/M2 | HEART RATE: 83 BPM | WEIGHT: 194.69 LBS | HEIGHT: 67 IN | OXYGEN SATURATION: 97 % | DIASTOLIC BLOOD PRESSURE: 60 MMHG | SYSTOLIC BLOOD PRESSURE: 122 MMHG

## 2018-01-22 DIAGNOSIS — M25.511 ACUTE PAIN OF RIGHT SHOULDER: ICD-10-CM

## 2018-01-22 DIAGNOSIS — N18.30 CKD (CHRONIC KIDNEY DISEASE) STAGE 3, GFR 30-59 ML/MIN: ICD-10-CM

## 2018-01-22 DIAGNOSIS — M25.511 ACUTE PAIN OF RIGHT SHOULDER: Primary | ICD-10-CM

## 2018-01-22 DIAGNOSIS — E78.00 PURE HYPERCHOLESTEROLEMIA: ICD-10-CM

## 2018-01-22 DIAGNOSIS — Z00.00 PREVENTATIVE HEALTH CARE: Primary | ICD-10-CM

## 2018-01-22 DIAGNOSIS — I10 ESSENTIAL HYPERTENSION: ICD-10-CM

## 2018-01-22 PROCEDURE — 99499 UNLISTED E&M SERVICE: CPT | Mod: S$GLB,,, | Performed by: FAMILY MEDICINE

## 2018-01-22 PROCEDURE — 99999 PR PBB SHADOW E&M-EST. PATIENT-LVL III: CPT | Mod: PBBFAC,,, | Performed by: FAMILY MEDICINE

## 2018-01-22 PROCEDURE — 99397 PER PM REEVAL EST PAT 65+ YR: CPT | Mod: S$GLB,,, | Performed by: FAMILY MEDICINE

## 2018-01-22 RX ORDER — LISINOPRIL AND HYDROCHLOROTHIAZIDE 20; 25 MG/1; MG/1
1 TABLET ORAL DAILY
Qty: 90 TABLET | Refills: 3 | Status: SHIPPED | OUTPATIENT
Start: 2018-01-22 | End: 2019-01-17 | Stop reason: SDUPTHER

## 2018-01-22 RX ORDER — PRAVASTATIN SODIUM 20 MG/1
20 TABLET ORAL DAILY
Qty: 90 TABLET | Refills: 3 | Status: SHIPPED | OUTPATIENT
Start: 2018-01-22 | End: 2019-01-07 | Stop reason: SDUPTHER

## 2018-01-22 NOTE — PROGRESS NOTES
CHIEF COMPLAINT: Establish a primary care physician    HISTORY OF PRESENT ILLNESS: The patient is a remarkably healthy 84-year-old black male.  The patient has no specific complaints today other than a 6 week history of right shoulder trauma.  He sustained blunt trauma to the right humeral head region about 6 weeks ago.  There is still a substantial amount of bruising.  He cannot raise his arm above 90°.    It has been a while since the patient has been seen.    The patient has a history of stable hypertension on current medications.  Patient denies chest pain or shortness of breath today.    The patient has a history of stable hyperlipidemia on current medications.  The patient denies chest pain or shortness of breath today.  The patient denies muscle aches or myalgias suggestive of myositis.    He has chronic kidney disease stage III.  He sees an outside nephrologist.    The patient was previously cared for by Dr. Pham.  The patient is establishing care with me today.    REVIEW OF SYSTEMS:  GENERAL: No fever, chills, fatigability or weight loss.  SKIN: No rashes, itching or changes in color or texture of skin.  HEAD: No headaches or recent head trauma.  EYES: Visual acuity fine. No photophobia, ocular pain or diplopia.  EARS: Denies ear pain, discharge or vertigo.  NOSE: No loss of smell, no epistaxis or postnasal drip.  MOUTH & THROAT: No hoarseness or change in voice. No excessive gum bleeding.  NODES: Denies swollen glands.  CHEST: Denies YUNG, cyanosis, wheezing, cough and sputum production.  CARDIOVASCULAR: Denies chest pain, PND, orthopnea or reduced exercise tolerance.  ABDOMEN: Appetite fine. No weight loss. Denies diarrhea, abdominal pain, hematemesis or blood in stool.  URINARY: No flank pain, dysuria or hematuria.  PERIPHERAL VASCULAR: No claudication or cyanosis.  MUSCULOSKELETAL: No joint stiffness or swelling. Denies back pain.except as mentioned above.  NEUROLOGIC: No history of seizures,  paralysis, alteration of gait or coordination.    SOCIAL HISTORY: The patient does not smoke.  The patient consumes alcohol socially.  The patient is happily  to another patient of mine.    PHYSICAL EXAMINATION:       APPEARANCE: Well nourished, well developed, in no acute distress.    HEAD: Normocephalic, atraumatic.  EYES: PERRL. EOMI.  Conjunctivae without injection and  anicteric  EARS: TM's intact. Light reflex normal. No retraction or perforation.    NOSE: Mucosa pink. Airway clear.  MOUTH & THROAT: No tonsillar enlargement. No pharyngeal erythema or exudate. No stridor.  NECK: Supple.   NODES: No cervical, axillary or inguinal lymph node enlargement.  CHEST: Lungs clear to auscultation.  No retractions are noted.  No rales or rhonchi are present.  CARDIOVASCULAR: Normal S1, S2. No rubs, murmurs or gallops.  ABDOMEN: Bowel sounds normal. Not distended. Soft. No tenderness or masses.  No ascites is noted.  MUSCULOSKELETAL:  There is no clubbing, cyanosis, or edema of the extremities x4.  There is full range of motion of the lumbar spine.  There is decreased range of motion of the right upper extremity.  There is no deformity noted.    NEUROLOGIC:       Normal speech development.      Hearing normal.      Normal gait.      Motor and sensory exams grossly normal.      DTR's normal.  PSYCHIATRIC: Patient is alert and oriented x3.  Thought processes are all normal.  There is no homicidality.  There is no suicidality.  There is no evidence of psychosis.    LABORATORY/RADIOLOGY:   Chart reviewed.      ASSESSMENT:   Normal physical exam in an apparently healthy individual  Acute right shoulder pain  Hypertension, condition is well controlled on current medication regimen  Hyperlipidemia, condition is well controlled on current medication regimen  Chronic kidney disease 3    PLAN:  Orthopedics referral  Medications refilled  Return to clinic in one year.

## 2018-01-25 ENCOUNTER — OFFICE VISIT (OUTPATIENT)
Dept: ORTHOPEDICS | Facility: CLINIC | Age: 83
End: 2018-01-25
Attending: FAMILY MEDICINE
Payer: MEDICARE

## 2018-01-25 ENCOUNTER — HOSPITAL ENCOUNTER (OUTPATIENT)
Dept: RADIOLOGY | Facility: OTHER | Age: 83
Discharge: HOME | End: 2018-01-25
Attending: PHYSICIAN ASSISTANT
Payer: MEDICARE

## 2018-01-25 VITALS
HEART RATE: 63 BPM | RESPIRATION RATE: 18 BRPM | DIASTOLIC BLOOD PRESSURE: 68 MMHG | HEIGHT: 67 IN | SYSTOLIC BLOOD PRESSURE: 122 MMHG | BODY MASS INDEX: 30.45 KG/M2 | WEIGHT: 194 LBS

## 2018-01-25 DIAGNOSIS — M25.511 ACUTE PAIN OF RIGHT SHOULDER: ICD-10-CM

## 2018-01-25 DIAGNOSIS — R29.898 WEAKNESS OF SHOULDER: ICD-10-CM

## 2018-01-25 DIAGNOSIS — M25.511 ACUTE PAIN OF RIGHT SHOULDER: Primary | ICD-10-CM

## 2018-01-25 PROCEDURE — 73030 X-RAY EXAM OF SHOULDER: CPT | Mod: TC,FY,RT

## 2018-01-25 PROCEDURE — 99999 PR PBB SHADOW E&M-EST. PATIENT-LVL IV: CPT | Mod: PBBFAC,,, | Performed by: PHYSICIAN ASSISTANT

## 2018-01-25 PROCEDURE — 73030 X-RAY EXAM OF SHOULDER: CPT | Mod: 26,RT,, | Performed by: RADIOLOGY

## 2018-01-25 PROCEDURE — 99213 OFFICE O/P EST LOW 20 MIN: CPT | Mod: S$GLB,,, | Performed by: PHYSICIAN ASSISTANT

## 2018-01-25 RX ORDER — DICLOFENAC SODIUM 10 MG/G
2 GEL TOPICAL 2 TIMES DAILY
Qty: 1 TUBE | Refills: 2 | Status: SHIPPED | OUTPATIENT
Start: 2018-01-25 | End: 2018-09-26

## 2018-01-25 NOTE — PROGRESS NOTES
"Subjective:      Patient ID: Viktor Townsend Sr. is a 84 y.o. male.    Chief Complaint: Pain of the Right Shoulder      HPI  Viktor Townsend Sr. is a right hand dominant 84 y.o. male presenting today for right shoulder pain.  There was a history of trauma.  Onset of symptoms began 1 month ago.  He was picking up some groceries, tripped, and fell onto the right shoulder.  He reports that the bruising has improved, he also says that his motion is improving, but he continues to have limited right shoulder motion.  He is using a "pain spray" which is providing some relief. He says that he is now able to sleep on the right side, but after the accident he was unable to sleep on that side.  He reports 4-5/10 pain with motion of the right shoulder.  He does take occasional Tylenol, which provides some relief.     Review of patient's allergies indicates:   Allergen Reactions    Oranges [orange] Shortness Of Breath         Current Outpatient Prescriptions   Medication Sig Dispense Refill    acetaminophen (TYLENOL) 325 MG tablet Take 325 mg by mouth every 6 (six) hours as needed for Pain.      bismuth subsalicylate (BISMUTH) 262 mg Chew Take by mouth 2 (two) times daily as needed.      calcium carbonate (TUMS) 200 mg calcium (500 mg) chewable tablet Take 1 tablet by mouth 3 (three) times daily as needed for Heartburn.      lisinopril-hydrochlorothiazide (PRINZIDE,ZESTORETIC) 20-25 mg Tab Take 1 tablet by mouth once daily. 90 tablet 3    oxyCODONE-acetaminophen (PERCOCET) 7.5-325 mg per tablet Take 1 tablet by mouth every 4 (four) hours as needed for Pain. 40 tablet 0    pravastatin (PRAVACHOL) 20 MG tablet Take 1 tablet (20 mg total) by mouth once daily. 90 tablet 3    diclofenac sodium 1 % Gel Apply 2 g topically 2 (two) times daily. Apply to shoulder in area of pain 1 Tube 2     No current facility-administered medications for this visit.        Past Medical History:   Diagnosis Date    CKD (chronic kidney disease)  " "   Colon polyps     Gout     Hiatal hernia     Hypertension     Lumbar back pain     Personal history of asbestosis        Past Surgical History:   Procedure Laterality Date    CHOLECYSTECTOMY      EYE SURGERY      PROSTATE SURGERY           Review of Systems:  Review of Systems   Constitution: Negative for chills and fever.   Skin: Negative for rash and suspicious lesions.   Musculoskeletal:        See HPI   Neurological: Negative for dizziness, headaches, light-headedness, numbness and paresthesias.   Psychiatric/Behavioral: Negative for depression. The patient is not nervous/anxious.          OBJECTIVE:     PHYSICAL EXAM:  Height: 5' 7" (170.2 cm) Weight: 88 kg (194 lb)     Vitals:    01/25/18 0843   BP: 122/68   Pulse: 63   Resp: 18     General    Vitals reviewed.  Constitutional: He is oriented to person, place, and time. He appears well-developed and well-nourished.   HENT:   Head: Normocephalic and atraumatic.   Neck: Normal range of motion.   Cardiovascular: Normal rate.    Pulmonary/Chest: Effort normal. No respiratory distress.   Neurological: He is alert and oriented to person, place, and time.   Psychiatric: He has a normal mood and affect. His behavior is normal. Judgment and thought content normal.             Musculoskeletal:  Very mild ecchymosis right upper arm and axillary aspect of right chest.  Nontender to palpation today.  No scars abrasions or lacerations.  Good elbow, wrist, and finger range of motion.  Active right shoulder range of motion is decreased, forward flexion to approximately 60°, abduction to approximately 60°.  IR to the level of L4, decreased ER and decreased adduction.  Passive range of motion of the right shoulder is showed full FF and abduction.  Right shoulder weakness noted.  Her vascular intact-good sensation and motor function, 2+ radial pulses.    RADIOGRAPHS:  Right Shoulder X-Ray, 1/25/2018  AP, scapular Y, and axillary radiographs of the right shoulder were " obtained.  The bones are intact.  There is no evidence for acute fracture or bone destruction.  There is no evidence for dislocation.  There are mild degenerative changes of the acromioclavicular joint identified.  Soft tissues are unremarkable.   Impression   No evidence for acute fracture, bone destruction, or dislocation.  Mild degenerative changes.     Comments: I have personally reviewed the imaging and I agree with the above radiologist's report.    ASSESSMENT/PLAN:   Viktor was seen today for pain.    Diagnoses and all orders for this visit:    Acute pain of right shoulder  -     Ambulatory Referral to Physical/Occupational Therapy  -     diclofenac sodium 1 % Gel; Apply 2 g topically 2 (two) times daily. Apply to shoulder in area of pain    Weakness of shoulder  -     Ambulatory Referral to Physical/Occupational Therapy  -     diclofenac sodium 1 % Gel; Apply 2 g topically 2 (two) times daily. Apply to shoulder in area of pain           - We talked at length about the anatomy and pathophysiology of   Encounter Diagnoses   Name Primary?    Acute pain of right shoulder Yes    Weakness of shoulder        - Discussed conservative treatment options for right shoulder pain and decreased range of motion  - Orders placed for occupational therapy  - Voltaren gel ordered and sent to pharmacy  - Follow-up in 2-3 months, call if pain is not improved  - Tylenol for pain  - Call with questions or concerns

## 2018-01-29 ENCOUNTER — TELEPHONE (OUTPATIENT)
Dept: ORTHOPEDICS | Facility: CLINIC | Age: 83
End: 2018-01-29

## 2018-01-29 NOTE — TELEPHONE ENCOUNTER
----- Message from Berta Bautista sent at 1/29/2018  9:51 AM CST -----  Contact: Self  X   1st Request  _  2nd Request  _  3rd Request        Who: Jayla Townsend (Spouse)    Why: Requesting a call back in regards to a prescription for cream for the pt. Pt's spouse is requesting an update on the authorization.    What Number to Call Back: [phone]    When to Expect a call back: (Within 24 hours)    Please return the call at earliest convenience. Thanks!

## 2018-01-29 NOTE — TELEPHONE ENCOUNTER
"Phone call to patient and his wife. Informed that PA process is underway. This med is not part of [facility] formulary, but we are attempting to have it approved. Patient's wife states that they have been using "tiger balm" and he is doing ROM exercises and seeing much improvement, he is to see PT on Friday. Suggested that he use heat prior to doing ROM, then ice after. Both verbalized understanding. She will keep me informed of PA status.   "

## 2018-02-02 ENCOUNTER — CLINICAL SUPPORT (OUTPATIENT)
Dept: REHABILITATION | Facility: HOSPITAL | Age: 83
End: 2018-02-02
Payer: MEDICARE

## 2018-02-02 DIAGNOSIS — M25.511 RIGHT SHOULDER PAIN, UNSPECIFIED CHRONICITY: ICD-10-CM

## 2018-02-02 PROCEDURE — G8985 CARRY GOAL STATUS: HCPCS | Mod: CJ,PO

## 2018-02-02 PROCEDURE — G8984 CARRY CURRENT STATUS: HCPCS | Mod: CL,PO

## 2018-02-02 PROCEDURE — 97530 THERAPEUTIC ACTIVITIES: CPT | Mod: PO

## 2018-02-02 PROCEDURE — 97161 PT EVAL LOW COMPLEX 20 MIN: CPT | Mod: PO

## 2018-02-02 NOTE — PROGRESS NOTES
Physical Therapy Evaluation  Visit: 1    Name: Viktor Townsend .  Clinic Number: [id number]    Viktor is a 84 y.o. male evaluated on 02/02/2018.     Diagnosis:   Encounter Diagnosis   Name Primary?    Right shoulder pain, unspecified chronicity        DOS: None    Physician: Nessa Phelan PA  Treatment Orders: PT Eval and Treat    Past Medical History:   Diagnosis Date    CKD (chronic kidney disease)     Colon polyps     Gout     Hiatal hernia     Hypertension     Lumbar back pain     Personal history of asbestosis      Current Outpatient Prescriptions   Medication Sig    acetaminophen (TYLENOL) 325 MG tablet Take 325 mg by mouth every 6 (six) hours as needed for Pain.    bismuth subsalicylate (BISMUTH) 262 mg Chew Take by mouth 2 (two) times daily as needed.    calcium carbonate (TUMS) 200 mg calcium (500 mg) chewable tablet Take 1 tablet by mouth 3 (three) times daily as needed for Heartburn.    diclofenac sodium 1 % Gel Apply 2 g topically 2 (two) times daily. Apply to shoulder in area of pain    lisinopril-hydrochlorothiazide (PRINZIDE,ZESTORETIC) 20-25 mg Tab Take 1 tablet by mouth once daily.    oxyCODONE-acetaminophen (PERCOCET) 7.5-325 mg per tablet Take 1 tablet by mouth every 4 (four) hours as needed for Pain.    pravastatin (PRAVACHOL) 20 MG tablet Take 1 tablet (20 mg total) by mouth once daily.     No current facility-administered medications for this visit.      Review of patient's allergies indicates:   Allergen Reactions    Oranges [orange] Shortness Of Breath     Precautions: Blood pressure/syncope, Standard  Occupation: retired  Dominant hand: R handed      Subjective  Onset/QUINTIN: sudden.  Pt reported that he fell ~ 4 steps to the ground on his R shoulder about 1 month ago  Involved Area/Location: right  Current Symptoms: pain and weakness    Increases symptoms: Lifting his R arm at the shoulder  Decreases  Symptoms: slot moving the shoulder    Previous Level of function: I in ADL's and ambulating I  Current level of function: Pt unable to raise his R arm over head.    Diagnostic Tests: Radiographs    Pain Scale: Viktor rates R shoulder pain with elevation pain to be 4.    Patient Goals: Pt would like to be able to return to pervious level of function      Objective    Posture: WNL    Functional Limitations  Reports - G Codes    Category:  Carry   Tool:  Clinical evaluation   Score:  NA   Current:  G 8984 CL   Goal:  G 8985 CJ         Passive Range of Motion: Shoulder   Left Right   Flexion: 165 143/75   IR: 45 70   ER: 90 70   Abduction: 150 90      UE Strength: 1/5 R shoulder ER, 2/5 R shoulder elevation    Special Tests: NA    Joint Mobility: hypomobile  Palpation: No palpable tenderness    PT Evaluation Completed? Yes  Discussed Plan of Care with patient: Yes    TREATMENT:  Pt performed there ex's for R UE strengthening, ROM, flexibility and endurance including:  Manual Therapy x 30 min  P and AA R shoulder ROM  Active R shoulder ER/IR 10 x 3 in supine  Passive mobilization R GH joint    Therapeutic Exercise x 10 min  Scapular retractions x 10  AA ROM R GH joint with L UE in supine and sitting  Instruction in HEP with above exercises.      Exercises were reviewed and pt was able to demonstrate them prior to the end of the session.     Pt educated to use ice for 10- 20 minutes to decrease swelling  and/ or pain.       Viktor desouza good understanding of the education provided. Patient demo good return demo of skill of exercises.      Assessment  This is a 84 y.o. male referred to outpatient physical therapy and presents with a medical diagnosis of   Encounter Diagnosis   Name Primary?    Right shoulder pain, unspecified chronicity     and demonstrates limitations as described in the problem list. Pt will benefit from physcial therapy services in order to maximize pain free and/or functional use of right shoulder. The  following goals were discussed with the patient and patient is in agreement with them as to be addressed in the treatment plan.     Problem List: pain, decreased ROM, decreased flexibility, decreased strength, decreased motor control, antalgic gait and decreased ability to fully participate in recreational/sports related activities.    GOALS: Short Term Goals:  4 weeks  1. Pt will demonstrate increased shoulder P & AAROM into flexion, abduction and ER.  2. Pt Independent with HEP to improve ROM and independence with ADL's    Long Term Goals: 8 weeks  1.Report decreased    R shoulder pain  <   / =  1  /10  to increase tolerance for ADL's  2.Increased MMT  for  R UE  to increase tolerance for ADL and work activities.  3.Increased PROM to WNL's of R shoulder shoulder to improve normal movement patterns during ADL's.  4. Pt will report improvement in overall functional abilities of UE, evidenced by improved score on FOTO Shoulder survey.      Plan  Pt will be treated by physical therapy 1-2 times a week for 8 weeks for Pt Education, HEP, therapeutic exercises, neuromuscular re-education, joint mobilizations, modalities prn to achieve established goals. Viktor may at times be seen by a PTA as part of the Rehab Team.     Cont PT for  8         weeks.     I certify the need for these services furnished under this plan of treatment and while under my care.______________________________ Physician/Referring Practitioner  Date of Signature

## 2018-02-03 NOTE — PLAN OF CARE
Physical Therapy Evaluation  Visit: 1    Name: Viktor Townsend .  Clinic Number: [id number]    Viktor is a 84 y.o. male evaluated on 02/02/2018.     Diagnosis:   Encounter Diagnosis   Name Primary?    Right shoulder pain, unspecified chronicity        DOS: None    Physician: Nessa Phelan PA  Treatment Orders: PT Eval and Treat    Past Medical History:   Diagnosis Date    CKD (chronic kidney disease)     Colon polyps     Gout     Hiatal hernia     Hypertension     Lumbar back pain     Personal history of asbestosis      Current Outpatient Prescriptions   Medication Sig    acetaminophen (TYLENOL) 325 MG tablet Take 325 mg by mouth every 6 (six) hours as needed for Pain.    bismuth subsalicylate (BISMUTH) 262 mg Chew Take by mouth 2 (two) times daily as needed.    calcium carbonate (TUMS) 200 mg calcium (500 mg) chewable tablet Take 1 tablet by mouth 3 (three) times daily as needed for Heartburn.    diclofenac sodium 1 % Gel Apply 2 g topically 2 (two) times daily. Apply to shoulder in area of pain    lisinopril-hydrochlorothiazide (PRINZIDE,ZESTORETIC) 20-25 mg Tab Take 1 tablet by mouth once daily.    oxyCODONE-acetaminophen (PERCOCET) 7.5-325 mg per tablet Take 1 tablet by mouth every 4 (four) hours as needed for Pain.    pravastatin (PRAVACHOL) 20 MG tablet Take 1 tablet (20 mg total) by mouth once daily.     No current facility-administered medications for this visit.      Review of patient's allergies indicates:   Allergen Reactions    Oranges [orange] Shortness Of Breath     Precautions: Blood pressure/syncope, Standard  Occupation: retired  Dominant hand: R handed      Subjective  Onset/QUINTIN: sudden.  Pt reported that he fell ~ 4 steps to the ground on his R shoulder about 1 month ago  Involved Area/Location: right  Current Symptoms: pain and weakness    Increases symptoms: Lifting his R arm at the shoulder  Decreases Symptoms: slot moving the shoulder    Previous Level of function: I in  ADL's and ambulating I  Current level of function: Pt unable to raise his R arm over head.    Diagnostic Tests: Radiographs    Pain Scale: Viktor rates R shoulder pain with elevation pain to be 4.    Patient Goals: Pt would like to be able to return to pervious level of function      Objective    Posture: WNL    Functional Limitations  Reports - G Codes    Category:  Carry   Tool:  Clinical evaluation   Score:  NA   Current:  G 8984 CL   Goal:  G 8985 CJ         Passive Range of Motion: Shoulder   Left Right   Flexion: 165 143/75   IR: 45 70   ER: 90 70   Abduction: 150 90      UE Strength: 1/5 R shoulder ER, 2/5 R shoulder elevation    Special Tests: NA    Joint Mobility: hypomobile  Palpation: No palpable tenderness    PT Evaluation Completed? Yes  Discussed Plan of Care with patient: Yes    TREATMENT:  Pt performed there ex's for R UE strengthening, ROM, flexibility and endurance including:  Manual Therapy x 30 min  P and AA R shoulder ROM  Active R shoulder ER/IR 10 x 3 in supine  Passive mobilization R GH joint    Therapeutic Exercise x 10 min  Scapular retractions x 10  AA ROM R GH joint with L UE in supine and sitting  Instruction in HEP with above exercises.      Exercises were reviewed and pt was able to demonstrate them prior to the end of the session.     Pt educated to use ice for 10- 20 minutes to decrease swelling  and/ or pain.       Viktor demo good understanding of the education provided. Patient demo good return demo of skill of exercises.      Assessment  This is a 84 y.o. male referred to outpatient physical therapy and presents with a medical diagnosis of   Encounter Diagnosis   Name Primary?    Right shoulder pain, unspecified chronicity     and demonstrates limitations as described in the problem list. Pt will benefit from physcial therapy services in order to maximize pain free and/or functional use of right shoulder. The following goals were discussed with the patient and patient is in  agreement with them as to be addressed in the treatment plan.     Problem List: pain, decreased ROM, decreased flexibility, decreased strength, decreased motor control, antalgic gait and decreased ability to fully participate in recreational/sports related activities.    GOALS: Short Term Goals:  4 weeks  1. Pt will demonstrate increased shoulder P & AAROM into flexion, abduction and ER.  2. Pt Independent with HEP to improve ROM and independence with ADL's    Long Term Goals: 8 weeks  1.Report decreased    R shoulder pain  <   / =  1  /10  to increase tolerance for ADL's  2.Increased MMT  for  R UE  to increase tolerance for ADL and work activities.  3.Increased PROM to WNL's of R shoulder shoulder to improve normal movement patterns during ADL's.  4. Pt will report improvement in overall functional abilities of UE, evidenced by improved score on FOTO Shoulder survey.      Plan  Pt will be treated by physical therapy 1-2 times a week for 8 weeks for Pt Education, HEP, therapeutic exercises, neuromuscular re-education, joint mobilizations, modalities prn to achieve established goals. Viktor may at times be seen by a PTA as part of the Rehab Team.     Cont PT for  8         weeks.     I certify the need for these services furnished under this plan of treatment and while under my care.______________________________ Physician/Referring Practitioner  Date of Signature

## 2018-02-07 ENCOUNTER — CLINICAL SUPPORT (OUTPATIENT)
Dept: REHABILITATION | Facility: HOSPITAL | Age: 83
End: 2018-02-07
Payer: MEDICARE

## 2018-02-07 DIAGNOSIS — M25.511 RIGHT SHOULDER PAIN, UNSPECIFIED CHRONICITY: ICD-10-CM

## 2018-02-07 PROCEDURE — 97110 THERAPEUTIC EXERCISES: CPT | Mod: PO

## 2018-02-07 PROCEDURE — 97530 THERAPEUTIC ACTIVITIES: CPT | Mod: PO

## 2018-02-07 NOTE — PROGRESS NOTES
Physical Therapy Daily Note  Visit: 1    Name: Viktor Townsend .  Clinic Number: [id number]    Viktor is a 84 y.o. male evaluated on 02/07/2018.     Diagnosis:   Encounter Diagnosis   Name Primary?    Right shoulder pain, unspecified chronicity        DOS: None    Physician: Nessa Phelan PA  Treatment Orders: PT Eval and Treat    Past Medical History:   Diagnosis Date    CKD (chronic kidney disease)     Colon polyps     Gout     Hiatal hernia     Hypertension     Lumbar back pain     Personal history of asbestosis      Current Outpatient Prescriptions   Medication Sig    acetaminophen (TYLENOL) 325 MG tablet Take 325 mg by mouth every 6 (six) hours as needed for Pain.    bismuth subsalicylate (BISMUTH) 262 mg Chew Take by mouth 2 (two) times daily as needed.    calcium carbonate (TUMS) 200 mg calcium (500 mg) chewable tablet Take 1 tablet by mouth 3 (three) times daily as needed for Heartburn.    diclofenac sodium 1 % Gel Apply 2 g topically 2 (two) times daily. Apply to shoulder in area of pain    lisinopril-hydrochlorothiazide (PRINZIDE,ZESTORETIC) 20-25 mg Tab Take 1 tablet by mouth once daily.    oxyCODONE-acetaminophen (PERCOCET) 7.5-325 mg per tablet Take 1 tablet by mouth every 4 (four) hours as needed for Pain.    pravastatin (PRAVACHOL) 20 MG tablet Take 1 tablet (20 mg total) by mouth once daily.     No current facility-administered medications for this visit.      Review of patient's allergies indicates:   Allergen Reactions    Oranges [orange] Shortness Of Breath     Precautions: Blood pressure/syncope, Standard  Occupation: retired  Dominant hand: R handed      Subjective  Onset/QUINTIN: sudden.  Pt reported that he fell ~ 4 steps to the ground on his R shoulder about 1 month ago  Involved Area/Location: right  Current Symptoms: pain and weakness    Increases symptoms: Lifting his R arm at the shoulder  Decreases  Symptoms: slot moving the shoulder    Previous Level of function: I in ADL's and ambulating I  Current level of function: Pt unable to raise his R arm over head.    Diagnostic Tests: Radiographs    Pain Scale: Viktor rates R shoulder pain with elevation pain to be 4.    Patient Goals: Pt would like to be able to return to pervious level of function  Pt reported that he is able to raise his arm to comb his hair with help from the L arm.    Objective    Posture: WNL    Functional Limitations  Reports - G Codes    Category:  Carry   Tool:  Clinical evaluation   Score:  NA   Current:  G 8984 CL   Goal:  G 8985 CJ         Passive Range of Motion: Shoulder   Left Right   Flexion: 165 143/75   IR: 45 70   ER: 90 70   Abduction: 150 90      UE Strength: 1/5 R shoulder ER, 2/5 R shoulder elevation    Special Tests: NA    Joint Mobility: hypomobile  Palpation: No palpable tenderness    TREATMENT:  Pt performed there ex's for R UE strengthening, ROM, flexibility and endurance including:  Manual Therapy x 30 min  AA R shoulder elevation  Active R shoulder ER/IR 10 x 3 in supine  Passive mobilization R GH joint    Therapeutic Exercise x 10 min  Standing rows with green TB 10 x 3 - added to HEP      Exercises were reviewed and pt was able to demonstrate them prior to the end of the session.     Pt educated to use ice for 10- 20 minutes to decrease swelling  and/ or pain.       Viktor demo good understanding of the education provided. Patient demo good return demo of skill of exercises.      Assessment  This is a 84 y.o. male referred to outpatient physical therapy and presents with a medical diagnosis of   Encounter Diagnosis   Name Primary?    Right shoulder pain, unspecified chronicity     and demonstrates limitations as described in the problem list. Pt will benefit from physcial therapy services in order to maximize pain free and/or functional use of right shoulder. The following goals were discussed with the patient and patient  is in agreement with them as to be addressed in the treatment plan. Pt tolerated Tx well today.  He reports no pain with sleeping and is able to comb his hair with the assist of the L UE.      Problem List: pain, decreased ROM, decreased flexibility, decreased strength, decreased motor control, antalgic gait and decreased ability to fully participate in recreational/sports related activities.    GOALS: Short Term Goals:  4 weeks  1. Pt will demonstrate increased shoulder P & AAROM into flexion, abduction and ER.  2. Pt Independent with HEP to improve ROM and independence with ADL's    Long Term Goals: 8 weeks  1.Report decreased    R shoulder pain  <   / =  1 /10  to increase tolerance for ADL's  2.Increased MMT  for  R UE  to increase tolerance for ADL and work activities.  3.Increased PROM to WNL's of R shoulder shoulder to improve normal movement patterns during ADL's.  4. Pt will report improvement in overall functional abilities of UE, evidenced by improved score on FOTO Shoulder survey.      Plan  Pt will be treated by physical therapy 1-2 times a week for 8 weeks for Pt Education, HEP, therapeutic exercises, neuromuscular re-education, joint mobilizations, modalities prn to achieve established goals. Viktor may at times be seen by a PTA as part of the Rehab Team.     Cont PT for  8         weeks.     I certify the need for these services furnished under this plan of treatment and while under my care.______________________________ Physician/Referring Practitioner  Date of Signature

## 2018-02-16 ENCOUNTER — CLINICAL SUPPORT (OUTPATIENT)
Dept: REHABILITATION | Facility: HOSPITAL | Age: 83
End: 2018-02-16
Payer: MEDICARE

## 2018-02-16 DIAGNOSIS — M25.511 RIGHT SHOULDER PAIN, UNSPECIFIED CHRONICITY: ICD-10-CM

## 2018-02-16 PROCEDURE — 97140 MANUAL THERAPY 1/> REGIONS: CPT | Mod: PO

## 2018-02-16 PROCEDURE — 97110 THERAPEUTIC EXERCISES: CPT | Mod: PO

## 2018-02-16 NOTE — PROGRESS NOTES
Physical Therapy Daily Note     Name: Viktor Townsend .  Clinic Number: [id number]  Diagnosis:   Encounter Diagnosis   Name Primary?    Right shoulder pain, unspecified chronicity      Physician: Nessa Phelan PA  Precautions: Blood pressure/syncope, Standard  Visit #: 3 of 12  Eval Date:2/2/18   Time In: 3:30  Time Out: 4:15  Treatment time: 45    Subjective     Pt reports: (R) shoulder discomfort and stiffness with attempts at overhead movement.   Pain Scale: Viktor rates pain on a scale of 0-10 to be 3 currently.    Objective     Viktor received individual therapeutic exercises to develop (R)UE strength, endurance, ROM, flexibility and posture for 35 minutes including:  Table top slides for shoulder flex stretch 2 x 10  Supine AA shoulder flex with dowel   x 10 with 5 sec hold  Patient receives PROM (R) shoulder flex/abd and ER/IR  [place] ER stretch with a dowel 10 x 10 sec  Sidelying A/A ER 2 x 10  AAROM on Pulleys for shoulder flex stretch x 2 minutes   Seated Scap retract with YTB 2 x 10  Standing Scap retract/Protract with T-ball on plinth  2 x 10  Standing H-abd/Add with T-ball on plinth x 10 each    Viktor received the following manual therapy techniques: Joint mobilizations were applied to the: (R) shoulder  for 10 minutes including:  GH oscillations,  Posterior/inferior GH glides Grade ll     Patient education: Patient educated to continue with previously issued HEP to tolerance. He was provided with an updated copy of HEP to include AAROM shoulder flex with dowel with good understanding.   No spiritual or educational barriers to learning      Assessment     Patient tolerated shanique Tx well. Some generalized (R) shoulder weakness/stiffness evident. Improved PROM/AROM evident during Tx with flexibility ex's and manual techniques. Discomfort level remained tolerable post Tx.   This is a 84 y.o. male referred to outpatient physical therapy and presents with a  medical diagnosis of   Encounter Diagnosis   Name Primary?    Right shoulder pain, unspecified chronicity     and demonstrates limitations as described in the problem list.   Pt will continue to benefit from skilled outpatient physical therapy to address the deficits listed in the problem list, provide pt/family education and to maximize pt's level of independence in the home and community environment.     GOALS: Short Term Goals:     1. Pt will demonstrate increased shoulder P & AAROM into flexion, abduction and ER.  2. Pt Independent with HEP to improve ROM and independence with ADL's     Long Term Goals:    1.Report decreased    R shoulder pain  <   / =  1  /10  to increase tolerance for ADL's  2.Increased MMT  for  R UE  to increase tolerance for ADL and work activities.  3.Increased PROM to WNL's of R shoulder shoulder to improve normal movement patterns during ADL's.  4. Pt will report improvement in overall functional abilities of UE, evidenced by improved score on FOTO Shoulder survey   Plan     Continue with established Plan of Care towards PT goals.    Therapist: Avery Leos, PTA  2/16/2018

## 2018-02-21 ENCOUNTER — CLINICAL SUPPORT (OUTPATIENT)
Dept: REHABILITATION | Facility: HOSPITAL | Age: 83
End: 2018-02-21
Payer: MEDICARE

## 2018-02-21 DIAGNOSIS — M25.511 RIGHT SHOULDER PAIN, UNSPECIFIED CHRONICITY: ICD-10-CM

## 2018-02-21 PROCEDURE — 97110 THERAPEUTIC EXERCISES: CPT | Mod: PO

## 2018-02-21 NOTE — PROGRESS NOTES
Physical Therapy Daily Note     Name: Viktor Townsend .  Clinic Number: [id number]  Diagnosis:   Encounter Diagnosis   Name Primary?    Right shoulder pain, unspecified chronicity      Physician: Nessa Phelan PA  Precautions: Blood pressure/syncope, Standard  Visit #: 4 of 12  Eval Date:2/2/18   Time In: 3:00  Time Out: 3:45  Treatment time: 45    Subjective     Pt reports: that his R shoudler is feeling and moving better.   Pain Scale: Viktor rates pain on a scale of 0-10 to be 3 currently.    Objective     Viktor received individual therapeutic exercises to develop (R)UE strength, endurance, ROM, flexibility and posture for 35 minutes including:  Table top slides for shoulder flex stretch 2 x 10  Supine AA shoulder flex with dowel   x 10 with 5 sec hold  Patient receives PROM (R) shoulder flex/abd and ER/IR  [place] ER stretch with a dowel 10 x 10 sec  Sidelying A/A ER 2 x 10  AAROM on Pulleys for shoulder flex stretch x 2 minutes   Seated Scap retract with YTB 2 x 10  Standing Scap retract/Protract with T-ball on plinth  2 x 10  Standing H-abd/Add with T-ball on plinth x 10 each      Tx not performed today.  Viktor received the following manual therapy techniques: Joint mobilizations were applied to the: (R) shoulder  for 10 minutes including:  GH oscillations,  Posterior/inferior GH glides Grade ll     Patient education: Patient educated to continue with previously issued HEP to tolerance. He was provided with an updated copy of HEP to include AAROM shoulder flex with dowel with good understanding.   No spiritual or educational barriers to learning      Assessment     Patient tolerated shanique Tx well. Some generalized (R) shoulder weakness/stiffness evident. Improved PROM/AROM evident during Tx with flexibility ex's and manual techniques. Discomfort level remained tolerable post Tx.   This is a 84 y.o. male referred to outpatient physical therapy and presents  with a medical diagnosis of   Encounter Diagnosis   Name Primary?    Right shoulder pain, unspecified chronicity     and demonstrates limitations as described in the problem list.   Pt will continue to benefit from skilled outpatient physical therapy to address the deficits listed in the problem list, provide pt/family education and to maximize pt's level of independence in the home and community environment.     GOALS: Short Term Goals:     1. Pt will demonstrate increased shoulder P & AAROM into flexion, abduction and ER.  2. Pt Independent with HEP to improve ROM and independence with ADL's     Long Term Goals:    1.Report decreased    R shoulder pain  <   / =  1  /10  to increase tolerance for ADL's  2.Increased MMT  for  R UE  to increase tolerance for ADL and work activities.  3.Increased PROM to WNL's of R shoulder shoulder to improve normal movement patterns during ADL's.  4. Pt will report improvement in overall functional abilities of UE, evidenced by improved score on FOTO Shoulder survey   Plan     Continue with established Plan of Care towards PT goals.    Therapist: Johann Guido, PT  2/21/2018

## 2018-02-23 ENCOUNTER — CLINICAL SUPPORT (OUTPATIENT)
Dept: REHABILITATION | Facility: HOSPITAL | Age: 83
End: 2018-02-23
Payer: MEDICARE

## 2018-02-23 DIAGNOSIS — M25.511 RIGHT SHOULDER PAIN, UNSPECIFIED CHRONICITY: ICD-10-CM

## 2018-02-23 PROCEDURE — 97110 THERAPEUTIC EXERCISES: CPT | Mod: PO

## 2018-02-23 NOTE — PROGRESS NOTES
"                                                    Physical Therapy Daily Note     Name: Viktor Townsend .  Clinic Number: [id number]  Diagnosis:   Encounter Diagnosis   Name Primary?    Right shoulder pain, unspecified chronicity      Physician: Nessa Phelan PA  Precautions: Blood pressure/syncope, Standard  Visit #: 5 of 12  Eval Date:2/2/18   Time In: 1;00  Time Out:1:45  Treatment time: 45    Subjective     Pt reports:  That his shoulder is coming along a " little bit at a time."   Pain Scale: Viktor rates pain on a scale of 0-10 to be 3 currently.    Objective     Viktor received individual therapeutic exercises to develop (R)UE strength, endurance, ROM, flexibility and posture for 40 minutes including:  Seated UBE x 4 minutes level 1 ( 2 minutes forward/backward)  Table top slides for shoulder flex stretch 2 x 10  AAROM on Pulleys for shoulder flex stretch x 2 minutes  Standing Scap retract with YTB  3 x 10  Standing Scap retract/Protract with T-ball on plinth  X 30   Standing H-abd/Add with T-ball on plinth x 30  AAROM scaption with dowel assist 2 x 10 ( cues to avoid shoulder hike)  Supine AA shoulder flex with dowel  2 x 10 with 5 sec hold  Patient receives PROM (R) shoulder flex/abd and ER/IR  [place] ER stretch with a dowel 10 x 5 sec  Sidelying A/A ER 2 x 10    Viktor received the following manual therapy techniques: Joint mobilizations were applied to the: (R) shoulder  for 5 minutes including:  GH oscillations,  Posterior/inferior GH glides Grade ll     Patient education: Patient educated to continue with previously issued HEP to tolerance with good understanding.  No spiritual or educational barriers to learning      Assessment     Patient tolerated shanique Tx fairly well. Some continued generalized (R) shoulder weakness/stiffness evident but appears to be improving. Requires verbal and tactile cues to avoid UT compensation with AAROM ex's.   Discomfort level remained tolerable during and post Tx. "   This is a 84 y.o. male referred to outpatient physical therapy and presents with a medical diagnosis of   Encounter Diagnosis   Name Primary?    Right shoulder pain, unspecified chronicity     and demonstrates limitations as described in the problem list.   Pt will continue to benefit from skilled outpatient physical therapy to address the deficits listed in the problem list, provide pt/family education and to maximize pt's level of independence in the home and community environment.     GOALS: Short Term Goals:     1. Pt will demonstrate increased shoulder P & AAROM into flexion, abduction and ER.  2. Pt Independent with HEP to improve ROM and independence with ADL's     Long Term Goals:    1.Report decreased    R shoulder pain  <   / =  1  /10  to increase tolerance for ADL's  2.Increased MMT  for  R UE  to increase tolerance for ADL and work activities.  3.Increased PROM to WNL's of R shoulder shoulder to improve normal movement patterns during ADL's.  4. Pt will report improvement in overall functional abilities of UE, evidenced by improved score on FOTO Shoulder survey   Plan     Continue with established Plan of Care towards PT goals.    Therapist: Avery Leos, PTA  2/23/2018

## 2018-02-28 ENCOUNTER — CLINICAL SUPPORT (OUTPATIENT)
Dept: REHABILITATION | Facility: HOSPITAL | Age: 83
End: 2018-02-28
Payer: MEDICARE

## 2018-02-28 DIAGNOSIS — M25.511 RIGHT SHOULDER PAIN, UNSPECIFIED CHRONICITY: ICD-10-CM

## 2018-02-28 NOTE — PROGRESS NOTES
"                                                                                                                                                                      Physical Therapy Daily Note     Name: Viktor Townsend .  Clinic Number: [id number]  Diagnosis:   Encounter Diagnosis   Name Primary?    Right shoulder pain, unspecified chronicity      Physician: Nessa Phelan PA  Precautions: Blood pressure/syncope, Standard  Visit #: 5 of 12  Eval Date:2/2/18   Time In: 3;00  Time Out:4:00  Treatment time: 45    Subjective     Pt reports:  That his shoulder is coming along a " little bit at a time."   Pain Scale: Viktor rates pain on a scale of 0-10 to be 3 currently.    Objective     Viktor received individual therapeutic exercises to develop (R)UE strength, endurance, ROM, flexibility and posture for 40 minutes including:  Seated UBE x 4 minutes level 1 ( 2 minutes forward/backward)  Table top slides for shoulder flex stretch 3 x 10  AAROM on Pulleys for shoulder flex stretch x 5 minutes  Standing Scap retract with YTB  3 x 10  Standing Scap retract/Protract with T-ball on plinth  X 30   Standing H-abd/Add with T-ball on plinth x 30  AAROM scaption with dowel assist 2 x 10 ( cues to avoid shoulder hike)  Supine AA shoulder flex with dowel  2 x 10 with 5 sec hold  Patient receives PROM (R) shoulder flex/abd and ER/IR  [place] ER stretch with a dowel 10 x 5 sec  Sidelying A/A ER 2 x 10    Viktor received the following manual therapy techniques: Joint mobilizations were applied to the: (R) shoulder  for 5 minutes including:  GH oscillations,  Posterior/inferior GH glides Grade ll     Patient education: Patient educated to continue with previously issued HEP to tolerance with good understanding.  No spiritual or educational barriers to learning      Assessment     Patient tolerated shanique Tx fairly well. Some continued generalized (R) shoulder weakness/stiffness evident but appears to be improving. Requires verbal and " tactile cues to avoid UT compensation with AAROM ex's.   Discomfort level remained tolerable during and post Tx.   This is a 84 y.o. male referred to outpatient physical therapy and presents with a medical diagnosis of   Encounter Diagnosis   Name Primary?    Right shoulder pain, unspecified chronicity     and demonstrates limitations as described in the problem list.   Pt will continue to benefit from skilled outpatient physical therapy to address the deficits listed in the problem list, provide pt/family education and to maximize pt's level of independence in the home and community environment.     GOALS: Short Term Goals:     1. Pt will demonstrate increased shoulder P & AAROM into flexion, abduction and ER.  2. Pt Independent with HEP to improve ROM and independence with ADL's     Long Term Goals:    1.Report decreased    R shoulder pain  <   / =  1  /10  to increase tolerance for ADL's  2.Increased MMT  for  R UE  to increase tolerance for ADL and work activities.  3.Increased PROM to WNL's of R shoulder shoulder to improve normal movement patterns during ADL's.  4. Pt will report improvement in overall functional abilities of UE, evidenced by improved score on FOTO Shoulder survey   Plan     Continue with established Plan of Care towards PT goals.    Therapist: Johann Guido, PT  2/28/2018

## 2018-03-02 ENCOUNTER — CLINICAL SUPPORT (OUTPATIENT)
Dept: REHABILITATION | Facility: HOSPITAL | Age: 83
End: 2018-03-02
Payer: MEDICARE

## 2018-03-02 DIAGNOSIS — M25.511 RIGHT SHOULDER PAIN, UNSPECIFIED CHRONICITY: ICD-10-CM

## 2018-03-02 PROCEDURE — 97110 THERAPEUTIC EXERCISES: CPT | Mod: PO

## 2018-03-02 NOTE — PROGRESS NOTES
Physical Therapy Daily Note     Name: Viktor Townsend .  Clinic Number: [id number]  Diagnosis:   Encounter Diagnosis   Name Primary?    Right shoulder pain, unspecified chronicity      Physician: Nessa Phelan PA  Precautions: Blood pressure/syncope, Standard  Visit #: 7 of 12  Eval Date:2/2/18   Time In: 1;00  Time Out:2:00  Treatment time: 55  1:1 time: 30    Subjective     Pt reports: symptoms improving but still some continued (R) Shoulder pain and weakness. States that he has some difficulty lifting his spoon to his mouth while eating his cereal this morning.   Pain Scale: Viktor rates pain on a scale of 0-10 to be 3 currently.    Objective     (R) shoulder PROM  Flexion 153   Abduction 140   ER  85   IR  65         Viktor received individual therapeutic exercises to develop (R)UE strength, endurance, ROM, flexibility and posture for 50 minutes including:  Seated UBE x 4 minutes level 1 ( 2 minutes forward/backward)  Table top slides for shoulder flex stretch 2 x 10  AAROM on Pulleys for shoulder flex stretch x 2 minutes  Standing Scap retract with OTB  3 x 10  Standing shoulder ext with OTB 2 x 10  Standing Scap retract/Protract with T-ball on plinth  X 30   Standing H-abd/Add with T-ball on plinth x 30  AAROM scaption with dowel assist 2 x 10 ( cues to avoid shoulder hike)  Supine AA shoulder flex with dowel  2 x 10 with 5 sec hold  Patient receives PROM (R) shoulder flex/abd and ER/IR  [place] ER stretch with a dowel 10 x 5 sec  Sidelying A/A ER 2 x 10     Viktor received the following manual therapy techniques: Joint mobilizations were applied to the: (R) shoulder  for 5 minutes including:  GH oscillations,  Posterior/inferior GH glides Grade ll     Patient education: Patient educated to continue with previously issued HEP to tolerance with good understanding.  No spiritual or educational barriers to learning      Assessment     Patient tolerated Tx fairly  well with little to no complaint of discomfort. Some generalized (R) shoulder weakness/stiffness continues to be evident and requires assist for ER and scaption movements. Patient still requires verbal and tactile cues to avoid UT compensation with AAROM ex's.   He is demonstrating some improvements with ROM from eval.  Discomfort level remained tolerable during and post Tx.   This is a 84 y.o. male referred to outpatient physical therapy and presents with a medical diagnosis of   Encounter Diagnosis   Name Primary?    Right shoulder pain, unspecified chronicity     and demonstrates limitations as described in the problem list.   Pt will continue to benefit from skilled outpatient physical therapy to address the deficits listed in the problem list, provide pt/family education and to maximize pt's level of independence in the home and community environment.           GOALS: Short Term Goals:     1. Pt will demonstrate increased shoulder P & AAROM into flexion, abduction and ER.  2. Pt Independent with HEP to improve ROM and independence with ADL's     Long Term Goals:    1.Report decreased    R shoulder pain  <   / =  1  /10  to increase tolerance for ADL's  2.Increased MMT  for  R UE  to increase tolerance for ADL and work activities.  3.Increased PROM to WNL's of R shoulder shoulder to improve normal movement patterns during ADL's.  4. Pt will report improvement in overall functional abilities of UE, evidenced by improved score on FOTO Shoulder survey   Plan     Continue with established Plan of Care towards PT goals.    Therapist: Avery Leos, PTA  3/2/2018

## 2018-03-07 ENCOUNTER — CLINICAL SUPPORT (OUTPATIENT)
Dept: REHABILITATION | Facility: HOSPITAL | Age: 83
End: 2018-03-07
Payer: MEDICARE

## 2018-03-07 DIAGNOSIS — M25.511 RIGHT SHOULDER PAIN, UNSPECIFIED CHRONICITY: ICD-10-CM

## 2018-03-07 PROCEDURE — 97110 THERAPEUTIC EXERCISES: CPT | Mod: PO

## 2018-03-07 PROCEDURE — 97140 MANUAL THERAPY 1/> REGIONS: CPT | Mod: PO

## 2018-03-07 NOTE — PROGRESS NOTES
Physical Therapy Daily Note     Name: Viktor Townsend .  Clinic Number: [id number]  Diagnosis:   Encounter Diagnosis   Name Primary?    Right shoulder pain, unspecified chronicity      Physician: Nessa Phelan PA  Precautions: Blood pressure/syncope, Standard  Visit #: 7 of 12  Eval Date:2/2/18   Time In: 1;00  Time Out:2:00  Treatment time: 55  1:1 time: 30    Subjective     Pt reports: that he had to use his L arm to raise his Right hand up to comb his hair  Pain Scale: Viktor rates pain on a scale of 0-10 to be 3    Objective     (R) shoulder PROM - not measured today.  Flexion 153   Abduction 140   ER  85   IR  65         Viktor received individual therapeutic exercises to develop (R)UE strength, endurance, ROM, flexibility and posture for 45 minutes including:  Seated UBE x 6 minutes level 1 ( 2 minutes forward/backward)  Table top slides for shoulder flex stretch 2 x 10  AAROM on Pulleys for shoulder flex stretch x 2 minutes  Standing Scap retract with GTB  3 x 10  Standing shoulder ext with OTB 2 x 10  Standing Scap retract/Protract with T-ball on plinth  X 30   Standing H-abd/Add with T-ball on plinth x 30  AAROM scaption with dowel assist 2 x 10 ( cues to avoid shoulder hike) - np  Supine AA shoulder flex with dowel  2 x 10 with 5 sec hold - np  Patient receives PROM (R) shoulder flex/abd and ER/IR  Shoulder ER stretch with a dowel 10 x 5 sec - np  Sidelying A/A ER 2 x 10 - np       Viktor received the following manual therapy techniques: Joint mobilizations were applied to the: (R) shoulder  for 15 minutes including:  GH oscillations,  Posterior/inferior GH glides Grade ll  PROM L shoulder  Manually resisted R shoulder IR/ER 10 x 3 each   Patient education: Patient educated to continue with previously issued HEP to tolerance with good understanding.  No spiritual or educational barriers to learning      Assessment     Patient tolerated Tx fairly well with  little to no complaint of discomfort. He exhibits increased strength in his R shoulder external rotators, but lacks endurance.  This is a 84 y.o. male referred to outpatient physical therapy and presents with a medical diagnosis of   Encounter Diagnosis   Name Primary?    Right shoulder pain, unspecified chronicity     and demonstrates limitations as described in the problem list.   Pt will continue to benefit from skilled outpatient physical therapy to address the deficits listed in the problem list, provide pt/family education and to maximize pt's level of independence in the home and community environment.           GOALS: Short Term Goals:     1. Pt will demonstrate increased shoulder P & AAROM into flexion, abduction and ER.  2. Pt Independent with HEP to improve ROM and independence with ADL's     Long Term Goals:    1.Report decreased    R shoulder pain  <   / =  1  /10  to increase tolerance for ADL's  2.Increased MMT  for  R UE  to increase tolerance for ADL and work activities.  3.Increased PROM to WNL's of R shoulder shoulder to improve normal movement patterns during ADL's.  4. Pt will report improvement in overall functional abilities of UE, evidenced by improved score on FOTO Shoulder survey   Plan     Continue with established Plan of Care towards PT goals.    Therapist: Johann Guido, PT  3/7/2018

## 2018-03-09 ENCOUNTER — CLINICAL SUPPORT (OUTPATIENT)
Dept: REHABILITATION | Facility: HOSPITAL | Age: 83
End: 2018-03-09
Payer: MEDICARE

## 2018-03-09 DIAGNOSIS — M25.511 RIGHT SHOULDER PAIN, UNSPECIFIED CHRONICITY: ICD-10-CM

## 2018-03-09 PROCEDURE — 97140 MANUAL THERAPY 1/> REGIONS: CPT | Mod: PO

## 2018-03-09 PROCEDURE — 97110 THERAPEUTIC EXERCISES: CPT | Mod: PO

## 2018-03-09 NOTE — PROGRESS NOTES
Physical Therapy Daily Note     Name: Viktor Townsend .  Clinic Number: [id number]  Diagnosis:   Encounter Diagnosis   Name Primary?    Right shoulder pain, unspecified chronicity      Physician: Nessa Phelan PA  Precautions: Blood pressure/syncope, Standard  Visit #: 7 of 12  Eval Date:2/2/18   Time In: 1;00  Time Out:2:00  Treatment time: 55  1:1 time: 30    Subjective     Pt reports: no new c/o pain  Pain Scale: Vitkor rates pain on a scale of 0-10 to be 3    Objective     (R) shoulder PROM - not measured today.  Flexion 155   Abduction 140   ER  65   IR  50         Viktor received individual therapeutic exercises to develop (R)UE strength, endurance, ROM, flexibility and posture for 45 minutes including:  Seated UBE x 6 minutes level 1 ( 2 minutes forward/backward)  Table top slides for shoulder flex stretch 3 x 10  AAROM on Pulleys for shoulder flex stretch x 2 minutes  Standing Scap retract with GTB  3 x 10  Standing shoulder ext with OTB 2 x 10  Standing Scap retract/Protract with T-ball on plinth  X 30   Standing H-abd/Add with T-ball on plinth x 30  AAROM scaption with dowel assist 2 x 10 ( cues to avoid shoulder hike) - np  Supine AA shoulder flex with dowel  2 x  with 5 sec hold - np  Patient receives PROM (R) shoulder flex/abd and ER/IR  Shoulder ER stretch with a dowel 10 x 5 sec - np  Sidelying A/A ER 2 x 10 - np       Viktor received the following manual therapy techniques: Joint mobilizations were applied to the: (R) shoulder  for 15 minutes including:  GH oscillations,  Posterior/inferior GH glides Grade ll  PROM L shoulder  Manually resisted R shoulder IR/ER 10 x 3 each   Patient education: Patient educated to continue with previously issued HEP to tolerance with good understanding.  No spiritual or educational barriers to learning      Assessment     Patient tolerated Tx fairly well with little to no complaint of discomfort. He exhibits little  change in R shoulder ROM and some loss of ER ROM  This is a 84 y.o. male referred to outpatient physical therapy and presents with a medical diagnosis of   Encounter Diagnosis   Name Primary?    Right shoulder pain, unspecified chronicity     and demonstrates limitations as described in the problem list.   Pt will continue to benefit from skilled outpatient physical therapy to address the deficits listed in the problem list, provide pt/family education and to maximize pt's level of independence in the home and community environment.           GOALS: Short Term Goals:     1. Pt will demonstrate increased shoulder P & AAROM into flexion, abduction and ER.  2. Pt Independent with HEP to improve ROM and independence with ADL's     Long Term Goals:    1.Report decreased    R shoulder pain  <   / =  1  /10  to increase tolerance for ADL's  2.Increased MMT  for  R UE  to increase tolerance for ADL and work activities.  3.Increased PROM to WNL's of R shoulder shoulder to improve normal movement patterns during ADL's.  4. Pt will report improvement in overall functional abilities of UE, evidenced by improved score on FOTO Shoulder survey   Plan     Continue with established Plan of Care towards PT goals.    Therapist: Johann Guido, PT  3/9/2018

## 2018-03-14 ENCOUNTER — CLINICAL SUPPORT (OUTPATIENT)
Dept: REHABILITATION | Facility: HOSPITAL | Age: 83
End: 2018-03-14
Payer: MEDICARE

## 2018-03-14 DIAGNOSIS — M25.511 RIGHT SHOULDER PAIN, UNSPECIFIED CHRONICITY: ICD-10-CM

## 2018-03-14 PROCEDURE — 97110 THERAPEUTIC EXERCISES: CPT | Mod: PO

## 2018-03-14 NOTE — PROGRESS NOTES
Physical Therapy Daily Note     Name: Viktor Townsend .  Clinic Number: [id number]  Diagnosis:   Encounter Diagnosis   Name Primary?    Right shoulder pain, unspecified chronicity      Physician: Nessa Phelan PA  Precautions: Blood pressure/syncope, Standard  Visit #: 10 of 12  Eval Date:2/2/18   Time In: 12;00  Time Out:12:50  Treatment time: 50  1:1 time: 25    Subjective     Pt reports: symptoms improving but still some continued (R) Shoulder pain and weakness. He reports some generalized soreness from working in the garden yesterday.  Pain Scale: Viktor rates pain on a scale of 0-10 to be 3 currently.    Objective       Viktor received individual therapeutic exercises to develop (R)UE strength, endurance, ROM, flexibility and posture for 50 minutes including:  Seated UBE x 5 minutes level 1 ( 2.5 minutes forward/backward)  Table top slides for shoulder flex stretch 2 x 10--NP  AAROM on Pulleys for shoulder flex stretch x 3 minutes  Standing Scap retract with GTB  3 x 10  Standing shoulder ext with OTB 3 x 10  Standing Scap retract/Protract with T-ball on plinth  X 30 --NP  Standing H-abd/Add with T-ball on plinth x 30--NP  Sidelying shoulder A/A abd to 90 degrees 2 x 10  Sidelying shoulder A/A  flex to 90 degrees 2 x 10  AAROM scaption with dowel assist 2 x 10 ( cues to avoid shoulder hike)  Supine AA shoulder flex with dowel  2 x 10 with 5 sec hold  Patient receives PROM (R) shoulder flex/abd and ER/IR  [place] ER stretch with a dowel 10 x 5 sec  Sidelying A/A ER 2 x 10     Viktor received the following manual therapy techniques: Joint mobilizations were applied to the: (R) shoulder  for 5 minutes including:  GH oscillations,  Posterior/inferior GH glides Grade ll     Patient education: Patient educated to continue with previously issued HEP to tolerance with good understanding.  No spiritual or educational barriers to learning      Face to face meeting  confirmed with primary therapist on 3/14/18.   signed by Avery Leos PTA    Assessment     Patient tolerated Tx fairly well with little to no complaint of discomfort. Some generalized (R) shoulder weakness/stiffness continues to be evident and requires assist for ER and scaption movements.Added Sidelying abd and flex movements today which he ws able to complete without pain althought required A/A due to weakness. Patient still requires verbal and tactile cues to avoid UT compensation with AAROM ex's.   Discomfort level remained tolerable during and post Tx.   This is a 84 y.o. male referred to outpatient physical therapy and presents with a medical diagnosis of   Encounter Diagnosis   Name Primary?    Right shoulder pain, unspecified chronicity     and demonstrates limitations as described in the problem list.   Pt will continue to benefit from skilled outpatient physical therapy to address the deficits listed in the problem list, provide pt/family education and to maximize pt's level of independence in the home and community environment.           GOALS: Short Term Goals:     1. Pt will demonstrate increased shoulder P & AAROM into flexion, abduction and ER.  2. Pt Independent with HEP to improve ROM and independence with ADL's     Long Term Goals:    1.Report decreased    R shoulder pain  <   / =  1  /10  to increase tolerance for ADL's  2.Increased MMT  for  R UE  to increase tolerance for ADL and work activities.  3.Increased PROM to WNL's of R shoulder shoulder to improve normal movement patterns during ADL's.  4. Pt will report improvement in overall functional abilities of UE, evidenced by improved score on FOTO Shoulder survey   Plan     Continue with established Plan of Care towards PT goals.    Therapist: Avery Leos PTA  3/14/2018

## 2018-03-16 ENCOUNTER — CLINICAL SUPPORT (OUTPATIENT)
Dept: REHABILITATION | Facility: HOSPITAL | Age: 83
End: 2018-03-16
Payer: MEDICARE

## 2018-03-16 DIAGNOSIS — M25.511 RIGHT SHOULDER PAIN, UNSPECIFIED CHRONICITY: ICD-10-CM

## 2018-03-16 PROCEDURE — 97110 THERAPEUTIC EXERCISES: CPT | Mod: PO

## 2018-03-16 PROCEDURE — 97140 MANUAL THERAPY 1/> REGIONS: CPT | Mod: PO

## 2018-03-16 NOTE — PROGRESS NOTES
Physical Therapy Daily Note     Name: Viktor Townsend .  Clinic Number: [id number]  Diagnosis:   Encounter Diagnosis   Name Primary?    Right shoulder pain, unspecified chronicity      Physician: Nessa Phelan PA  Precautions: Blood pressure/syncope, Standard  Visit #: 10 of 12  Eval Date:2/2/18   Time In: 12;00  Time Out:12:50  Treatment time: 50  1:1 time: 25    Subjective     Pt reports: symptoms improving but still some continued (R) Shoulder pain and weakness. He reports some generalized soreness from working in the garden yesterday.  Pain Scale: Viktor rates pain on a scale of 0-10 to be 3 currently.    Objective       Viktor received individual therapeutic exercises to develop (R)UE strength, endurance, ROM, flexibility and posture for 50 minutes including:  Seated UBE x 5 minutes level 1 ( 2.5 minutes forward/backward)  Table top slides for shoulder flex stretch 2 x 10--NP  AAROM on Pulleys for shoulder flex stretch x 3 minutes  Standing Scap retract with GTB  3 x 10  Standing shoulder ext with OTB 3 x 10  Sidelying shoulder A/A abd to 90 degrees 2 x 10 - with tactile cues to engage intrascapular musculature with each repetition  Sidelying shoulder A/A  flex to 90 degrees 2 x 1 - with tactile cues to engage intrascapular musculature with each repetition0  AAROM scaption with dowel assist 2 x 10 ( cues to avoid shoulder hike) - np  Supine AA shoulder flex with dowel  2 x 10 with 5 sec hold - np  Patient receives PROM (R) shoulder flex/abd and ER/IR  [place] ER stretch with a dowel 10 x 5 sec - np  Sidelying A/A ER 2 x 10 - np       Viktor received the following manual therapy techniques: Joint mobilizations were applied to the: (R) shoulder  for 10 minutes including:  GH oscillations,  Posterior/inferior GH glides Grade ll  Manually resisted R shoulder IR/ER 15 x 3   Patient education: Patient educated to continue with previously issued HEP to tolerance with  good understanding.  No spiritual or educational barriers to learning      Assessment     Patient tolerated Tx fairly well with little to no complaint of discomfort. Some generalized (R) shoulder weakness/stiffness continues to be evident, but he was able to activly elevate his R UE bringing his hand above his head.  Patient still requires verbal and tactile cues to avoid UT compensation with A and AAROM ex's.   Discomfort level remained tolerable during and post Tx.   This is a 84 y.o. male referred to outpatient physical therapy and presents with a medical diagnosis of   Encounter Diagnosis   Name Primary?    Right shoulder pain, unspecified chronicity     and demonstrates limitations as described in the problem list.   Pt will continue to benefit from skilled outpatient physical therapy to address the deficits listed in the problem list, provide pt/family education and to maximize pt's level of independence in the home and community environment.           GOALS: Short Term Goals:     1. Pt will demonstrate increased shoulder P & AAROM into flexion, abduction and ER.  2. Pt Independent with HEP to improve ROM and independence with ADL's     Long Term Goals:    1.Report decreased    R shoulder pain  <   / =  1  /10  to increase tolerance for ADL's  2.Increased MMT  for  R UE  to increase tolerance for ADL and work activities.  3.Increased PROM to WNL's of R shoulder shoulder to improve normal movement patterns during ADL's.  4. Pt will report improvement in overall functional abilities of UE, evidenced by improved score on FOTO Shoulder survey   Plan     Continue with established Plan of Care towards PT goals.    Therapist: Johann Guido, PT  3/16/2018

## 2018-03-21 ENCOUNTER — CLINICAL SUPPORT (OUTPATIENT)
Dept: REHABILITATION | Facility: HOSPITAL | Age: 83
End: 2018-03-21
Payer: MEDICARE

## 2018-03-21 DIAGNOSIS — M25.511 RIGHT SHOULDER PAIN, UNSPECIFIED CHRONICITY: ICD-10-CM

## 2018-03-21 PROCEDURE — 97110 THERAPEUTIC EXERCISES: CPT | Mod: PO

## 2018-03-21 PROCEDURE — 97150 GROUP THERAPEUTIC PROCEDURES: CPT | Mod: PO

## 2018-03-21 PROCEDURE — 97140 MANUAL THERAPY 1/> REGIONS: CPT | Mod: PO

## 2018-03-21 NOTE — PROGRESS NOTES
Physical Therapy Daily Note     Name: Viktor Townsend .  Clinic Number: [id number]  Diagnosis:   Encounter Diagnosis   Name Primary?    Right shoulder pain, unspecified chronicity      Physician: Nessa Phelan PA  Precautions: Blood pressure/syncope, Standard  Visit #: 12 of 12  Eval Date:2/2/18   Time In: 12;30  Time Out:1:15  Treatment time: 50  1:1 time: 25    Subjective     Pt reports: that he is able to raise his R hand to his head with some difficulty.  He also stated that he is going to the Dr on Friday.   Pain Scale: Viktor rates pain on a scale of 0-10 to be 3 currently.    Objective     R shoulder PROM 3/21/2018  Flexion 140  Abduction 90  Internal Rotation 70  External Rotation 65    Viktor received individual therapeutic exercises to develop (R)UE strength, endurance, ROM, flexibility and posture for 50 minutes including:  Seated UBE x 6 minutes level 1 ( 3.0 minutes forward/backward)  Table top slides for shoulder flex stretch 2 x 10--NP  AAROM on Pulleys for shoulder flex stretch x 3 minutes  Standing Scap retract with GTB  3 x 10  Standing shoulder ext with OTB 3 x 10  Sidelying shoulder A/A abd to 90 degrees 2 x 10 - with tactile cues to engage intrascapular musculature with each repetition  Sidelying shoulder A/A  flex to 90 degrees 2 x 1 - with tactile cues to engage intrascapular musculature with each repetition0  AAROM scaption with dowel assist 2 x 10 ( cues to avoid shoulder hike) - np  Supine AA shoulder flex with dowel  2 x 10 with 5 sec hold - np  Patient receives PROM (R) shoulder flex/abd and ER/IR  [place] ER stretch with a dowel 10 x 5 sec - np  Sidelying A/A ER 2 x 10 - np       Viktor received the following manual therapy techniques: Joint mobilizations were applied to the: (R) shoulder  for 10 minutes including:  GH oscillations,  Posterior/inferior GH glides Grade ll  Manually resisted R shoulder IR/ER 15 x 3   Patient education:  "Patient educated to continue with previously issued HEP to tolerance with good understanding.  No spiritual or educational barriers to learning      Assessment     Patient tolerated Tx fairly well with little to no complaint of discomfort. He reports that he is able to reach to the top of his head with the R hand, but has "popping" in the shoulder with this movement.  Pt is showing slow progress in R UE strength and ROM.  This is a 84 y.o. male referred to outpatient physical therapy and presents with a medical diagnosis of   Encounter Diagnosis   Name Primary?    Right shoulder pain, unspecified chronicity     and demonstrates limitations as described in the problem list.   Pt will continue to benefit from skilled outpatient physical therapy to address the deficits listed in the problem list, provide pt/family education and to maximize pt's level of independence in the home and community environment.           GOALS: Short Term Goals:     1. Pt will demonstrate increased shoulder P & AAROM into flexion, abduction and ER. - met  2. Pt Independent with HEP to improve ROM and independence with ADL's - met     Long Term Goals:    1.Report decreased    R shoulder pain  <   / =  1  /10  to increase tolerance for ADL's  2.Increased MMT  for  R UE  to increase tolerance for ADL and work activities.  3.Increased PROM to WNL's of R shoulder shoulder to improve normal movement patterns during ADL's.  4. Pt will report improvement in overall functional abilities of UE, evidenced by improved score on FOTO Shoulder survey   Plan     Continue with established Plan of Care towards PT goals.    Therapist: Johann Guido, PT  3/21/2018  "

## 2018-03-22 ENCOUNTER — OFFICE VISIT (OUTPATIENT)
Dept: ORTHOPEDICS | Facility: CLINIC | Age: 83
End: 2018-03-22
Attending: FAMILY MEDICINE
Payer: MEDICARE

## 2018-03-22 VITALS
SYSTOLIC BLOOD PRESSURE: 128 MMHG | RESPIRATION RATE: 18 BRPM | HEART RATE: 56 BPM | HEIGHT: 67 IN | DIASTOLIC BLOOD PRESSURE: 68 MMHG | BODY MASS INDEX: 30.92 KG/M2 | WEIGHT: 197 LBS

## 2018-03-22 DIAGNOSIS — M25.511 ACUTE PAIN OF RIGHT SHOULDER: Primary | ICD-10-CM

## 2018-03-22 PROCEDURE — 3074F SYST BP LT 130 MM HG: CPT | Mod: CPTII,S$GLB,, | Performed by: PHYSICIAN ASSISTANT

## 2018-03-22 PROCEDURE — 3078F DIAST BP <80 MM HG: CPT | Mod: CPTII,S$GLB,, | Performed by: PHYSICIAN ASSISTANT

## 2018-03-22 PROCEDURE — 99999 PR PBB SHADOW E&M-EST. PATIENT-LVL IV: CPT | Mod: PBBFAC,,, | Performed by: PHYSICIAN ASSISTANT

## 2018-03-22 PROCEDURE — 99213 OFFICE O/P EST LOW 20 MIN: CPT | Mod: 25,S$GLB,, | Performed by: PHYSICIAN ASSISTANT

## 2018-03-22 PROCEDURE — 20610 DRAIN/INJ JOINT/BURSA W/O US: CPT | Mod: RT,S$GLB,, | Performed by: PHYSICIAN ASSISTANT

## 2018-03-22 RX ORDER — TRIAMCINOLONE ACETONIDE 40 MG/ML
80 INJECTION, SUSPENSION INTRA-ARTICULAR; INTRAMUSCULAR
Status: COMPLETED | OUTPATIENT
Start: 2018-03-22 | End: 2018-03-22

## 2018-03-22 RX ORDER — LIDOCAINE HYDROCHLORIDE 10 MG/ML
4 INJECTION, SOLUTION EPIDURAL; INFILTRATION; INTRACAUDAL; PERINEURAL
Status: COMPLETED | OUTPATIENT
Start: 2018-03-22 | End: 2018-03-22

## 2018-03-22 RX ADMIN — TRIAMCINOLONE ACETONIDE 80 MG: 40 INJECTION, SUSPENSION INTRA-ARTICULAR; INTRAMUSCULAR at 02:03

## 2018-03-22 RX ADMIN — LIDOCAINE HYDROCHLORIDE 40 MG: 10 INJECTION, SOLUTION EPIDURAL; INFILTRATION; INTRACAUDAL; PERINEURAL at 02:03

## 2018-03-22 NOTE — PROGRESS NOTES
Subjective:      Patient ID: Viktor Townsend Sr. is a 84 y.o. male.    Chief Complaint: Pain of the Right Shoulder      HPI  Viktor Townsend Sr. is a right hand dominant 84 y.o. male presenting today for right shoulder pain.  He has been attending PT regularly and admits to improvement in pain and motion since his last visit. Voltaren gel denied by insurance.  He is using bengay cream and cortizone cream on the shoulder, admits to mild improvement. Pain is increased with motion, 3-4/10.      There was a history of trauma.  Onset of symptoms began late December 2017.  He was picking up some groceries, tripped, and fell onto the right shoulder.  He still has some discomfort with sleeping on the right side. He does take occasional Tylenol, which provides some relief.         Review of patient's allergies indicates:   Allergen Reactions    Oranges [orange] Shortness Of Breath         Current Outpatient Prescriptions   Medication Sig Dispense Refill    acetaminophen (TYLENOL) 325 MG tablet Take 325 mg by mouth every 6 (six) hours as needed for Pain.      bismuth subsalicylate (BISMUTH) 262 mg Chew Take by mouth 2 (two) times daily as needed.      calcium carbonate (TUMS) 200 mg calcium (500 mg) chewable tablet Take 1 tablet by mouth 3 (three) times daily as needed for Heartburn.      diclofenac sodium 1 % Gel Apply 2 g topically 2 (two) times daily. Apply to shoulder in area of pain 1 Tube 2    lisinopril-hydrochlorothiazide (PRINZIDE,ZESTORETIC) 20-25 mg Tab Take 1 tablet by mouth once daily. 90 tablet 3    oxyCODONE-acetaminophen (PERCOCET) 7.5-325 mg per tablet Take 1 tablet by mouth every 4 (four) hours as needed for Pain. 40 tablet 0    pravastatin (PRAVACHOL) 20 MG tablet Take 1 tablet (20 mg total) by mouth once daily. 90 tablet 3     No current facility-administered medications for this visit.        Past Medical History:   Diagnosis Date    CKD (chronic kidney disease)     Colon polyps     Gout      "Hiatal hernia     Hypertension     Lumbar back pain     Personal history of asbestosis        Past Surgical History:   Procedure Laterality Date    CHOLECYSTECTOMY      EYE SURGERY      PROSTATE SURGERY           Review of Systems:  Review of Systems   Constitution: Negative for chills and fever.   Skin: Negative for rash and suspicious lesions.   Musculoskeletal:        See HPI   Neurological: Negative for dizziness, headaches, light-headedness, numbness and paresthesias.   Psychiatric/Behavioral: Negative for depression. The patient is not nervous/anxious.          OBJECTIVE:     PHYSICAL EXAM:  Height: 5' 7" (170.2 cm) Weight: 89.4 kg (197 lb)     Vitals:    03/22/18 1343   BP: 128/68   Pulse: (!) 56   Resp: 18     General    Vitals reviewed.  Constitutional: He is oriented to person, place, and time. He appears well-developed and well-nourished.   HENT:   Head: Normocephalic and atraumatic.   Neck: Normal range of motion.   Cardiovascular: Normal rate.    Pulmonary/Chest: Effort normal. No respiratory distress.   Neurological: He is alert and oriented to person, place, and time.   Psychiatric: He has a normal mood and affect. His behavior is normal. Judgment and thought content normal.             Musculoskeletal:  No ecchymosis right upper arm and axillary aspect of right chest.  Nontender to palpation today.  No scars abrasions or lacerations.  Good elbow, wrist, and finger range of motion.  Active right shoulder range of motion is decreased, forward flexion to approximately 100°, abduction to approximately 90°.  IR to the level of L4, decreased ER and decreased adduction.  Passive range of motion of the right shoulder is showed full FF and abduction.  Right shoulder weakness noted.  Her vascular intact-good sensation and motor function, 2+ radial pulses.    RADIOGRAPHS:  Right Shoulder X-Ray, 1/25/2018  AP, scapular Y, and axillary radiographs of the right shoulder were obtained.  The bones are intact. "  There is no evidence for acute fracture or bone destruction.  There is no evidence for dislocation.  There are mild degenerative changes of the acromioclavicular joint identified.  Soft tissues are unremarkable.   Impression   No evidence for acute fracture, bone destruction, or dislocation.  Mild degenerative changes.     Comments: I have personally reviewed the imaging and I agree with the above radiologist's report.    ASSESSMENT/PLAN:   Viktor was seen today for pain.    Diagnoses and all orders for this visit:    Acute pain of right shoulder  -     Ambulatory Referral to Physical/Occupational Therapy    Other orders  -     lidocaine (PF) 10 mg/ml (1%) injection 40 mg; 4 mLs (40 mg total) by Other route one time.  -     triamcinolone acetonide injection 80 mg; 2 mLs (80 mg total) by INTRABURSAL route one time.           - We talked at length about the anatomy and pathophysiology of   Encounter Diagnosis   Name Primary?    Acute pain of right shoulder Yes       - Discussed conservative treatment options for right shoulder pain and decreased range of motion, will perform steroid injection today  - New orders placed for occupational therapy  - Follow-up in 2-3 months, call if pain is not improved  - Tylenol for pain  - Call with questions or concerns      PROCEDURE NOTE:  I have explained the risks, benefits, and alternatives of the procedure in detail.  The patient voices understanding and all questions have been answered.  The patient agrees to proceed as planned, consents to injection. Pause for timeout. After a sterile prep of the skin performed in the normal fashion, the right shoulder is injected from the posterior approach using a 22 gauge needle with a combination of 4 cc 1% lidocaine and 80 mg of kenalog. The patient is cautioned and immediate relief of pain is secondary to the local anesthetic and will be temporary.  After the anesthetic wears off there may be a increase in pain that may last for a few  hours or a few days and they should use ice to help alleviate this flair up of pain.

## 2018-03-28 ENCOUNTER — CLINICAL SUPPORT (OUTPATIENT)
Dept: REHABILITATION | Facility: HOSPITAL | Age: 83
End: 2018-03-28
Payer: MEDICARE

## 2018-03-28 DIAGNOSIS — M25.511 RIGHT SHOULDER PAIN, UNSPECIFIED CHRONICITY: ICD-10-CM

## 2018-03-28 PROCEDURE — 97530 THERAPEUTIC ACTIVITIES: CPT | Mod: PO

## 2018-03-28 PROCEDURE — 97110 THERAPEUTIC EXERCISES: CPT | Mod: PO

## 2018-03-28 NOTE — PROGRESS NOTES
Physical Therapy Daily Note     Name: Viktor Townsend .  Clinic Number: [id number]  Diagnosis:   Encounter Diagnosis   Name Primary?    Right shoulder pain, unspecified chronicity      Physician: Nessa Phelan PA  Precautions: Blood pressure/syncope, Standard  Visit #: 12 of 12  Eval Date:2/2/18   Time In: 12;30  Time Out:1:15  Treatment time: 45  1:1 time: 45    Subjective     Pt reports: that he saw his Dr and received an injection in the R shoulder.  Pain Scale: Viktor rates pain on a scale of 0-10 to be 3 currently.    Objective     R shoulder PROM 3/28/2018  Flexion 158  Abduction 127  Internal Rotation 70  External Rotation 80    Viktor received individual therapeutic exercises to develop (R)UE strength, endurance, ROM, flexibility and posture for 50 minutes including:  Seated UBE x 6 minutes level 1 ( 3.0 minutes forward/backward)  Table top slides for shoulder flex stretch 2 x 10--NP  AAROM on Pulleys for shoulder flex stretch x 3 minutes  Standing Scap retract with GTB  3 x 10 - np  Standing shoulder ext with OTB 3 x 10 - np  Sidelying shoulder A/A abd to 90 degrees 2 x 10 - with tactile cues to engage intrascapular musculature with each repetition  Sidelying shoulder A/A  flex to 90 degrees 2 x 1 - with tactile cues to engage intrascapular musculature with each repetition0  AAROM scaption with dowel assist 2 x 10 ( cues to avoid shoulder hike) - np  Supine AA shoulder flex with dowel  2 x 10 with 5 sec hold   Patient receives PROM (R) shoulder flex/abd and ER/IR  [place] ER stretch with a dowel 10 x 5 sec - np  Sidelying A/A ER 2 x 10 - np       iVktor received the following manual therapy techniques: Joint mobilizations were applied to the: (R) shoulder  for 10 minutes including:  GH oscillations,  Posterior/inferior GH glides Grade ll  Manually resisted R shoulder IR/ER 15 x 3   Patient education: Patient educated to continue with previously issued HEP  to tolerance with good understanding.  No spiritual or educational barriers to learning      Assessment     Patient tolerated Tx fairly well with little to no complaint of discomfort. He reports that his R shoulder is feeling a little better following the injection  This is a 84 y.o. male referred to outpatient physical therapy and presents with a medical diagnosis of   Encounter Diagnosis   Name Primary?    Right shoulder pain, unspecified chronicity     and demonstrates limitations as described in the problem list.   Pt will continue to benefit from skilled outpatient physical therapy to address the deficits listed in the problem list, provide pt/family education and to maximize pt's level of independence in the home and community environment.           GOALS: Short Term Goals:     1. Pt will demonstrate increased shoulder P & AAROM into flexion, abduction and ER. - met  2. Pt Independent with HEP to improve ROM and independence with ADL's - met     Long Term Goals:    1.Report decreased    R shoulder pain  <   / =  1  /10  to increase tolerance for ADL's  2.Increased MMT  for  R UE  to increase tolerance for ADL and work activities.  3.Increased PROM to WNL's of R shoulder shoulder to improve normal movement patterns during ADL's.  4. Pt will report improvement in overall functional abilities of UE, evidenced by improved score on FOTO Shoulder survey   Plan     Continue with established Plan of Care towards PT goals.    Therapist: Johann Guido, PT  3/28/2018

## 2018-04-04 ENCOUNTER — CLINICAL SUPPORT (OUTPATIENT)
Dept: REHABILITATION | Facility: HOSPITAL | Age: 83
End: 2018-04-04
Payer: MEDICARE

## 2018-04-04 DIAGNOSIS — M25.511 RIGHT SHOULDER PAIN, UNSPECIFIED CHRONICITY: ICD-10-CM

## 2018-04-04 PROCEDURE — 97110 THERAPEUTIC EXERCISES: CPT | Mod: PO

## 2018-04-04 PROCEDURE — 97530 THERAPEUTIC ACTIVITIES: CPT | Mod: PO

## 2018-04-04 NOTE — PROGRESS NOTES
Physical Therapy Daily Note     Name: Viktor Townsend .  Clinic Number: [id number]  Diagnosis:   Encounter Diagnosis   Name Primary?    Right shoulder pain, unspecified chronicity      Physician: Nessa Phelan PA  Precautions: Blood pressure/syncope, Standard  Visit #: 12 of 12  Eval Date:2/2/18   Time In: 12;30  Time Out:1:15  Treatment time: 45  1:1 time: 45    Subjective     Pt reports: that his shoulder is nottoo sore today.  Pain Scale: Viktor rates pain on a scale of 0-10 to be 3 currently.    Objective     R shoulder PROM 3/28/2018  Flexion 158  Abduction 127  Internal Rotation 70  External Rotation 80    Viktor received individual therapeutic exercises to develop (R)UE strength, endurance, ROM, flexibility and posture for 50 minutes including:  Seated UBE x 6 minutes level 1 ( 3.0 minutes forward/backward)  Table top slides for shoulder flex stretch 2 x 10--NP  AAROM on Pulleys for shoulder flex stretch x 3 minutes  Standing Scap retract with GTB  3 x 10 - np  Standing shoulder ext with OTB 3 x 10 - np  Sidelying shoulder A/A abd to 90 degrees 2 x 10 - with tactile cues to engage intrascapular musculature with each repetition  Sidelying shoulder A/A  flex to 90 degrees 2 x 10 - with tactile cues to engage intrascapular musculature with each repetition0  AAROM scaption with dowel assist 2 x 10 ( cues to avoid shoulder hike) - np  Supine AA shoulder flex with dowel  2 x 10 with 5 sec hold   Patient receives PROM (R) shoulder flex/abd and ER/IR  [place] ER stretch with a dowel 10 x 5 sec - np  Sidelying A/A ER 2 x 10 - np  Supine AA R shoulder D2 flexion 10 x 2     Viktor received the following manual therapy techniques: Joint mobilizations were applied to the: (R) shoulder  for 10 minutes including:  GH oscillations,  Posterior/inferior GH glides Grade ll  Manually resisted R shoulder IR/ER 15 x 3   Patient education: Patient educated to continue with  previously issued HEP to tolerance with good understanding.  No spiritual or educational barriers to learning      Assessment     Patient tolerated Tx fairly well with little to no complaint of discomfort.   This is a 84 y.o. male referred to outpatient physical therapy and presents with a medical diagnosis of   Encounter Diagnosis   Name Primary?    Right shoulder pain, unspecified chronicity     and demonstrates limitations as described in the problem list.   Pt will continue to benefit from skilled outpatient physical therapy to address the deficits listed in the problem list, provide pt/family education and to maximize pt's level of independence in the home and community environment.           GOALS: Short Term Goals:     1. Pt will demonstrate increased shoulder P & AAROM into flexion, abduction and ER. - met  2. Pt Independent with HEP to improve ROM and independence with ADL's - met     Long Term Goals:    1.Report decreased    R shoulder pain  <   / =  1  /10  to increase tolerance for ADL's  2.Increased MMT  for  R UE  to increase tolerance for ADL and work activities.  3.Increased PROM to WNL's of R shoulder shoulder to improve normal movement patterns during ADL's.  4. Pt will report improvement in overall functional abilities of UE, evidenced by improved score on FOTO Shoulder survey   Plan     Continue with established Plan of Care towards PT goals.    Therapist: Johann Guido, PT  4/4/2018

## 2018-04-16 ENCOUNTER — OFFICE VISIT (OUTPATIENT)
Dept: INTERNAL MEDICINE | Facility: CLINIC | Age: 83
End: 2018-04-16
Attending: FAMILY MEDICINE
Payer: MEDICARE

## 2018-04-16 VITALS
BODY MASS INDEX: 28.6 KG/M2 | HEART RATE: 82 BPM | SYSTOLIC BLOOD PRESSURE: 124 MMHG | HEIGHT: 68 IN | DIASTOLIC BLOOD PRESSURE: 66 MMHG | OXYGEN SATURATION: 97 % | WEIGHT: 188.69 LBS

## 2018-04-16 DIAGNOSIS — E78.00 PURE HYPERCHOLESTEROLEMIA: ICD-10-CM

## 2018-04-16 DIAGNOSIS — R60.0 HAND EDEMA: Primary | ICD-10-CM

## 2018-04-16 DIAGNOSIS — N18.30 CKD (CHRONIC KIDNEY DISEASE) STAGE 3, GFR 30-59 ML/MIN: ICD-10-CM

## 2018-04-16 DIAGNOSIS — I10 ESSENTIAL HYPERTENSION: ICD-10-CM

## 2018-04-16 PROCEDURE — 3078F DIAST BP <80 MM HG: CPT | Mod: CPTII,S$GLB,, | Performed by: FAMILY MEDICINE

## 2018-04-16 PROCEDURE — 99214 OFFICE O/P EST MOD 30 MIN: CPT | Mod: S$GLB,,, | Performed by: FAMILY MEDICINE

## 2018-04-16 PROCEDURE — 3074F SYST BP LT 130 MM HG: CPT | Mod: CPTII,S$GLB,, | Performed by: FAMILY MEDICINE

## 2018-04-16 PROCEDURE — 99999 PR PBB SHADOW E&M-EST. PATIENT-LVL III: CPT | Mod: PBBFAC,,, | Performed by: FAMILY MEDICINE

## 2018-04-16 RX ORDER — HYDROXYZINE HYDROCHLORIDE 25 MG/1
25 TABLET, FILM COATED ORAL 3 TIMES DAILY PRN
Qty: 30 TABLET | Refills: 0 | Status: SHIPPED | OUTPATIENT
Start: 2018-04-16 | End: 2018-09-26

## 2018-04-16 RX ORDER — CLINDAMYCIN HYDROCHLORIDE 300 MG/1
300 CAPSULE ORAL 3 TIMES DAILY
Qty: 21 CAPSULE | Refills: 0 | Status: SHIPPED | OUTPATIENT
Start: 2018-04-16 | End: 2018-05-17

## 2018-04-16 NOTE — PROGRESS NOTES
CHIEF COMPLAINT: 4 days of right hand edema     HISTORY OF PRESENT ILLNESS: The patient is a remarkably healthy 84-year-old black male.  The patient has no specific complaints today other than a 4 day history of right hand edema.  It is quite significant.  He does note that he was working in the garden when the swelling onset.  It is painful with range of motion.  There is no pus.  There is a lot of erythema.      He also has a several month  history of right shoulder trauma.  He sustained blunt trauma to the right humeral head region.  He cannot raise his arm above 90°.    The patient has a history of stable hypertension on current medications.  Patient denies chest pain or shortness of breath today.    The patient has a history of stable hyperlipidemia on current medications.  The patient denies chest pain or shortness of breath today.  The patient denies muscle aches or myalgias suggestive of myositis.    He has chronic kidney disease stage III.  He sees an outside nephrologist.    The patient was previously cared for by Dr. Pham.  The patient is establishing care with me today.    REVIEW OF SYSTEMS:  GENERAL: No fever, chills, fatigability or weight loss.  SKIN: No rashes, itching or changes in color or texture of skin.  HEAD: No headaches or recent head trauma.  EYES: Visual acuity fine. No photophobia, ocular pain or diplopia.  EARS: Denies ear pain, discharge or vertigo.  NOSE: No loss of smell, no epistaxis or postnasal drip.  MOUTH & THROAT: No hoarseness or change in voice. No excessive gum bleeding.  NODES: Denies swollen glands.  CHEST: Denies YUNG, cyanosis, wheezing, cough and sputum production.  CARDIOVASCULAR: Denies chest pain, PND, orthopnea or reduced exercise tolerance.  ABDOMEN: Appetite fine. No weight loss. Denies diarrhea, abdominal pain, hematemesis or blood in stool.  URINARY: No flank pain, dysuria or hematuria.  PERIPHERAL VASCULAR: No claudication or cyanosis.  MUSCULOSKELETAL: No  "joint stiffness or swelling. Denies back pain.  except as mentioned above.  NEUROLOGIC: No history of seizures, paralysis, alteration of gait or coordination.    SOCIAL HISTORY: The patient does not smoke.  The patient consumes alcohol socially.  The patient is happily  to another patient of mine.    PHYSICAL EXAMINATION: Blood pressure 124/66, pulse 82, height 5' 7.5" (1.715 m), weight 85.6 kg (188 lb 11.4 oz), SpO2 97 %.  APPEARANCE: Well nourished, well developed, in no acute distress.    HEAD: Normocephalic, atraumatic.  EYES: PERRL. EOMI.  Conjunctivae without injection and  anicteric  EARS: TM's intact. Light reflex normal. No retraction or perforation.    NOSE: Mucosa pink. Airway clear.  MOUTH & THROAT: No tonsillar enlargement. No pharyngeal erythema or exudate. No stridor.  NECK: Supple.   NODES: No cervical, axillary or inguinal lymph node enlargement.  CHEST: Lungs clear to auscultation.  No retractions are noted.  No rales or rhonchi are present.  CARDIOVASCULAR: Normal S1, S2. No rubs, murmurs or gallops.  ABDOMEN: Bowel sounds normal. Not distended. Soft. No tenderness or masses.  No ascites is noted.  MUSCULOSKELETAL:  There is no clubbing, cyanosis, or edema of the extremities x4.  There is full range of motion of the lumbar spine.  There is decreased range of motion of the right upper extremity.  There is no deformity noted.    NEUROLOGIC:       Normal speech development.      Hearing normal.      Normal gait.      Motor and sensory exams grossly normal.      DTR's normal.  PSYCHIATRIC: Patient is alert and oriented x3.  Thought processes are all normal.  There is no homicidality.  There is no suicidality.  There is no evidence of psychosis.    LABORATORY/RADIOLOGY:   Chart reviewed.      ASSESSMENT:   Right hand oedema  Acute right shoulder pain  Hypertension, condition is well controlled on current medication regimen  Hyperlipidemia, condition is well controlled on current medication " regimen  Chronic kidney disease 3    PLAN:  Overall it is not clear what is going on here.  I suspect it is an ALLERGIC reaction more than anything possibly to an insect bite or some garden chemical.  It does not appear to be obviously infected but certainly he could have sustained some micro-trauma that allowed an infection to get started.  We will treat him aggressively and keep in touch with him.  No doubt the shoulder injury which is restricting his ability to keep his hand elevated above the level of the heart is not helping.    Atarax and Clinda  Return to clinic in several days if not better

## 2018-04-17 ENCOUNTER — TELEPHONE (OUTPATIENT)
Dept: INTERNAL MEDICINE | Facility: CLINIC | Age: 83
End: 2018-04-17

## 2018-04-17 NOTE — TELEPHONE ENCOUNTER
----- Message from Elba Figueroa sent at 4/17/2018  4:15 PM CDT -----  Contact: wife            Name of Who is Calling: EVANS EPSTEIN SR. [[id number]]      What is the request in detail: pt's wife states hydrOXYzine HCl (ATARAX) 25 MG tablet was never received at Pharmacy.. Please advise      Can the clinic reply by [facility]:no      What Number to Call Back [facility]: [phone]

## 2018-04-17 NOTE — TELEPHONE ENCOUNTER
Call pt rx and they said that they receive fefill for Atarax but it needs PA.pt inform that med needs PA and I will work on it tomomorrow.Pt agrees and nikita;oze

## 2018-04-18 NOTE — TELEPHONE ENCOUNTER
----- Message from Khushi Chávez sent at 4/18/2018  3:08 PM CDT -----  Contact: Patient's wife / Ph# [phone]      Can the clinic reply in [facility]:   No      Please refill the medication(s) listed below. Please call the patient when the prescription(s) is ready for  at this phone number     [phone]       Medication #1   allopurinol (ZYLOPRIM) 300 MG tablet       Preferred Pharmacy: [id number] - [place] - [address] [facility] [address]     [phone] (Phone)  [phone] (Fax)

## 2018-04-19 ENCOUNTER — OFFICE VISIT (OUTPATIENT)
Dept: INTERNAL MEDICINE | Facility: CLINIC | Age: 83
End: 2018-04-19
Attending: FAMILY MEDICINE
Payer: MEDICARE

## 2018-04-19 VITALS
BODY MASS INDEX: 29.27 KG/M2 | HEIGHT: 67 IN | HEART RATE: 74 BPM | SYSTOLIC BLOOD PRESSURE: 122 MMHG | WEIGHT: 186.5 LBS | DIASTOLIC BLOOD PRESSURE: 64 MMHG | OXYGEN SATURATION: 97 %

## 2018-04-19 DIAGNOSIS — N18.30 CKD (CHRONIC KIDNEY DISEASE) STAGE 3, GFR 30-59 ML/MIN: ICD-10-CM

## 2018-04-19 DIAGNOSIS — I10 ESSENTIAL HYPERTENSION: ICD-10-CM

## 2018-04-19 DIAGNOSIS — E78.00 PURE HYPERCHOLESTEROLEMIA: ICD-10-CM

## 2018-04-19 DIAGNOSIS — R60.0 HAND EDEMA: Primary | ICD-10-CM

## 2018-04-19 PROCEDURE — 99999 PR PBB SHADOW E&M-EST. PATIENT-LVL III: CPT | Mod: PBBFAC,,, | Performed by: FAMILY MEDICINE

## 2018-04-19 PROCEDURE — 99214 OFFICE O/P EST MOD 30 MIN: CPT | Mod: S$GLB,,, | Performed by: FAMILY MEDICINE

## 2018-04-19 PROCEDURE — 99499 UNLISTED E&M SERVICE: CPT | Mod: S$GLB,,, | Performed by: FAMILY MEDICINE

## 2018-04-19 PROCEDURE — 3074F SYST BP LT 130 MM HG: CPT | Mod: CPTII,S$GLB,, | Performed by: FAMILY MEDICINE

## 2018-04-19 PROCEDURE — 3078F DIAST BP <80 MM HG: CPT | Mod: CPTII,S$GLB,, | Performed by: FAMILY MEDICINE

## 2018-04-19 RX ORDER — COLCHICINE 0.6 MG/1
0.6 TABLET ORAL 2 TIMES DAILY
Qty: 30 TABLET | Refills: 0 | Status: SHIPPED | OUTPATIENT
Start: 2018-04-19 | End: 2018-05-06 | Stop reason: SDUPTHER

## 2018-04-19 RX ORDER — ALLOPURINOL 300 MG/1
300 TABLET ORAL DAILY
Qty: 30 TABLET | Refills: 5 | Status: SHIPPED | OUTPATIENT
Start: 2018-04-19 | End: 2018-10-19 | Stop reason: SDUPTHER

## 2018-04-19 NOTE — PROGRESS NOTES
CHIEF COMPLAINT: 8 days of right hand edema     HISTORY OF PRESENT ILLNESS: The patient is a remarkably healthy 84-year-old black male.  The patient has no specific complaints today other than a 8 day history of right hand edema.  It is quite significant.  He does note that he was working in the garden when the swelling onset.  It is painful with range of motion.  There is no pus.  There is a lot of erythema.  He was seen 4 days ago and started on meds.  Unclear what he actually filled.  Wife believes this is gout.  +H/O gout in distant past.    He also has a several month  history of right shoulder trauma.  He sustained blunt trauma to the right humeral head region.  He cannot raise his arm above 90°.    The patient has a history of stable hypertension on current medications.  Patient denies chest pain or shortness of breath today.    The patient has a history of stable hyperlipidemia on current medications.  The patient denies chest pain or shortness of breath today.  The patient denies muscle aches or myalgias suggestive of myositis.    He has chronic kidney disease stage III.  He sees an outside nephrologist.    The patient was previously cared for by Dr. Pham.     REVIEW OF SYSTEMS:  GENERAL: No fever, chills, fatigability or weight loss.  SKIN: No rashes, itching or changes in color or texture of skin.  HEAD: No headaches or recent head trauma.  EYES: Visual acuity fine. No photophobia, ocular pain or diplopia.  EARS: Denies ear pain, discharge or vertigo.  NOSE: No loss of smell, no epistaxis or postnasal drip.  MOUTH & THROAT: No hoarseness or change in voice. No excessive gum bleeding.  NODES: Denies swollen glands.  CHEST: Denies YUNG, cyanosis, wheezing, cough and sputum production.  CARDIOVASCULAR: Denies chest pain, PND, orthopnea or reduced exercise tolerance.  ABDOMEN: Appetite fine. No weight loss. Denies diarrhea, abdominal pain, hematemesis or blood in stool.  URINARY: No flank pain, dysuria or  "hematuria.  PERIPHERAL VASCULAR: No claudication or cyanosis.  MUSCULOSKELETAL: No joint stiffness or swelling. Denies back pain.  except as mentioned above.  NEUROLOGIC: No history of seizures, paralysis, alteration of gait or coordination.    SOCIAL HISTORY: The patient does not smoke.  The patient consumes alcohol socially.  The patient is happily  to another patient of mine.    PHYSICAL EXAMINATION: Blood pressure 122/64, pulse 74, height 5' 7" (1.702 m), weight 84.6 kg (186 lb 8.2 oz), SpO2 97 %.  APPEARANCE: Well nourished, well developed, in no acute distress.    HEAD: Normocephalic, atraumatic.  EYES: PERRL. EOMI.  Conjunctivae without injection and  anicteric  EARS: TM's intact. Light reflex normal. No retraction or perforation.    NOSE: Mucosa pink. Airway clear.  MOUTH & THROAT: No tonsillar enlargement. No pharyngeal erythema or exudate. No stridor.  NECK: Supple.   NODES: No cervical, axillary or inguinal lymph node enlargement.  CHEST: Lungs clear to auscultation.  No retractions are noted.  No rales or rhonchi are present.  CARDIOVASCULAR: Normal S1, S2. No rubs, murmurs or gallops.  ABDOMEN: Bowel sounds normal. Not distended. Soft. No tenderness or masses.  No ascites is noted.  MUSCULOSKELETAL:  There is no clubbing, cyanosis, or edema of the extremities x4.  There is full range of motion of the lumbar spine.  There is decreased range of motion of the right upper extremity.  There is no deformity noted.    NEUROLOGIC:       Normal speech development.      Hearing normal.      Normal gait.      Motor and sensory exams grossly normal.      DTR's normal.  PSYCHIATRIC: Patient is alert and oriented x3.  Thought processes are all normal.  There is no homicidality.  There is no suicidality.  There is no evidence of psychosis.    LABORATORY/RADIOLOGY:   Chart reviewed.      ASSESSMENT:   Right hand oedema in pt w/h/o gout  Acute right shoulder pain  Hypertension, condition is well controlled on " current medication regimen  Hyperlipidemia, condition is well controlled on current medication regimen  Chronic kidney disease 3    PLAN:  Overall it remains not clear what is going on here.  I do not think this is gout but given his Hx and lack of response to previous meds will treat as gout.  We will treat him aggressively and keep in touch with him.  No doubt the shoulder injury which is restricting his ability to keep his hand elevated above the level of the heart is not helping.      Allopurinol and colchicine  Atarax and Clinda  Return to clinic in several days if not better

## 2018-04-23 ENCOUNTER — TELEPHONE (OUTPATIENT)
Dept: INTERNAL MEDICINE | Facility: CLINIC | Age: 83
End: 2018-04-23

## 2018-04-25 ENCOUNTER — TELEPHONE (OUTPATIENT)
Dept: INTERNAL MEDICINE | Facility: CLINIC | Age: 83
End: 2018-04-25

## 2018-04-25 NOTE — TELEPHONE ENCOUNTER
----- Message from Khushi Chávez sent at 4/25/2018  9:35 AM CDT -----  Contact: Sanjuana / [employer] / # [phone]      Name of Who is Calling: [employer]ju[employer] / [employer]      What is the request in detail:     Prior authorization is needed for the medication hydrOXYzine HCl (ATARAX) 25 MG tablet before the end of today Wednesday 04/25/2018.   THANKS!      Can the clinic reply by [facility]: No      What Number to Call Back if not in [facility]: [phone]

## 2018-04-26 NOTE — TELEPHONE ENCOUNTER
Sanjuana: States the pt must try and fail xyzal  Prior to rx for hydroxyzine being approved.  Sanjuana states the rx request will be withdrawn as the [place] deadline is 3pm. If pt is in need of rx before trying and failing xyzal please resubmit rx.Please advise/authorize?

## 2018-04-27 RX ORDER — LEVOCETIRIZINE DIHYDROCHLORIDE 5 MG/1
5 TABLET, FILM COATED ORAL NIGHTLY
Qty: 30 TABLET | Refills: 11 | Status: SHIPPED | OUTPATIENT
Start: 2018-04-27 | End: 2018-09-26

## 2018-04-30 NOTE — TELEPHONE ENCOUNTER
Pt/Jayla(spouse) informed of substitute rx. States the pt no longer has a need for rx, stating ABX helped clear issues. Pt/Jayla would like to know when pt can resume exercising. Please advise/authorize?

## 2018-05-06 ENCOUNTER — TELEPHONE (OUTPATIENT)
Dept: INTERNAL MEDICINE | Facility: CLINIC | Age: 83
End: 2018-05-06

## 2018-05-07 RX ORDER — COLCHICINE 0.6 MG/1
TABLET, FILM COATED ORAL
Qty: 30 TABLET | Refills: 0 | Status: SHIPPED | OUTPATIENT
Start: 2018-05-07 | End: 2018-09-26

## 2018-05-07 NOTE — TELEPHONE ENCOUNTER
----- Message from Cheyanne Reis sent at 5/7/2018  1:02 PM CDT -----  Contact: Jayla Townsend Spouse             Name of Who is Calling: Jayla Townsend Spouse       What is the request in detail: Pt wife is calling to find out about pt needing to continue taking the COLCRYS 0.6 mg tablet for the infection because pt has finished the first 30 tablets..      Can the clinic reply by [facility]: No      What Number to Call Back [facility]: [phone]

## 2018-05-07 NOTE — TELEPHONE ENCOUNTER
Informed pt wife that he don't need any more colcrys if he's feeling better. Pt wife verbally understand.

## 2018-05-17 ENCOUNTER — OFFICE VISIT (OUTPATIENT)
Dept: ORTHOPEDICS | Facility: CLINIC | Age: 83
End: 2018-05-17
Attending: FAMILY MEDICINE
Payer: MEDICARE

## 2018-05-17 VITALS
HEART RATE: 91 BPM | WEIGHT: 186 LBS | BODY MASS INDEX: 29.19 KG/M2 | HEIGHT: 67 IN | SYSTOLIC BLOOD PRESSURE: 102 MMHG | DIASTOLIC BLOOD PRESSURE: 66 MMHG

## 2018-05-17 DIAGNOSIS — M25.511 RIGHT SHOULDER PAIN, UNSPECIFIED CHRONICITY: Primary | ICD-10-CM

## 2018-05-17 DIAGNOSIS — M25.611 DECREASED RIGHT SHOULDER RANGE OF MOTION: ICD-10-CM

## 2018-05-17 PROCEDURE — 3078F DIAST BP <80 MM HG: CPT | Mod: CPTII,S$GLB,, | Performed by: PHYSICIAN ASSISTANT

## 2018-05-17 PROCEDURE — 99999 PR PBB SHADOW E&M-EST. PATIENT-LVL III: CPT | Mod: PBBFAC,,, | Performed by: PHYSICIAN ASSISTANT

## 2018-05-17 PROCEDURE — 99213 OFFICE O/P EST LOW 20 MIN: CPT | Mod: S$GLB,,, | Performed by: PHYSICIAN ASSISTANT

## 2018-05-17 PROCEDURE — 3074F SYST BP LT 130 MM HG: CPT | Mod: CPTII,S$GLB,, | Performed by: PHYSICIAN ASSISTANT

## 2018-05-17 NOTE — PROGRESS NOTES
"Subjective:      Patient ID: Viktor Townsend Sr. is a 85 y.o. male.    Chief Complaint: Pain of the Right Shoulder      HPI  Viktor Townsend Sr. is a right hand dominant 85 y.o. male presenting today for follow up of right shoulder pain. He underwent steroid injection at his last visit, he thinks that it may have helped.  He also attended PT and is performing his HEP three times/day.  Voltaren gel denied by insurance.  Pain is reported as 2/10.  Pain is increased with motion.  He also reports "clicking with shoulder motion." Since his last visit he also had a gout flare in his right hand, improved with colchicine and allopurinol.      There was a history of trauma.  Onset of symptoms began late December 2017.  He was picking up some groceries, tripped, and fell onto the right shoulder.  He still has some discomfort with sleeping on the right side. He does take occasional Tylenol, which provides some relief.         Review of patient's allergies indicates:   Allergen Reactions    Oranges [orange] Shortness Of Breath         Current Outpatient Prescriptions   Medication Sig Dispense Refill    acetaminophen (TYLENOL) 325 MG tablet Take 325 mg by mouth every 6 (six) hours as needed for Pain.      allopurinol (ZYLOPRIM) 300 MG tablet Take 1 tablet (300 mg total) by mouth once daily. 30 tablet 5    calcium carbonate (TUMS) 200 mg calcium (500 mg) chewable tablet Take 1 tablet by mouth 3 (three) times daily as needed for Heartburn.      COLCRYS 0.6 mg tablet TAKE 1 TABLET(0.6 MG) BY MOUTH TWICE DAILY 30 tablet 0    levocetirizine (XYZAL) 5 MG tablet Take 1 tablet (5 mg total) by mouth every evening. 30 tablet 11    lisinopril-hydrochlorothiazide (PRINZIDE,ZESTORETIC) 20-25 mg Tab Take 1 tablet by mouth once daily. 90 tablet 3    pravastatin (PRAVACHOL) 20 MG tablet Take 1 tablet (20 mg total) by mouth once daily. 90 tablet 3    diclofenac sodium 1 % Gel Apply 2 g topically 2 (two) times daily. Apply to shoulder in " "area of pain 1 Tube 2    hydrOXYzine HCl (ATARAX) 25 MG tablet Take 1 tablet (25 mg total) by mouth 3 (three) times daily as needed for Itching. 30 tablet 0     No current facility-administered medications for this visit.        Past Medical History:   Diagnosis Date    CKD (chronic kidney disease)     Colon polyps     Gout     Hiatal hernia     Hypertension     Lumbar back pain     Personal history of asbestosis        Past Surgical History:   Procedure Laterality Date    CHOLECYSTECTOMY      EYE SURGERY      PROSTATE SURGERY           Review of Systems:  Review of Systems   Constitution: Negative for chills and fever.   Skin: Negative for rash and suspicious lesions.   Musculoskeletal:        See HPI   Neurological: Negative for dizziness, headaches, light-headedness, numbness and paresthesias.   Psychiatric/Behavioral: Negative for depression. The patient is not nervous/anxious.          OBJECTIVE:     PHYSICAL EXAM:  Height: 5' 7" (170.2 cm) Weight: 84.4 kg (186 lb)     Vitals:    05/17/18 1329   BP: 102/66   Pulse: 91     General    Vitals reviewed.  Constitutional: He is oriented to person, place, and time. He appears well-developed and well-nourished.   HENT:   Head: Normocephalic and atraumatic.   Neck: Normal range of motion.   Cardiovascular: Normal rate.    Pulmonary/Chest: Effort normal. No respiratory distress.   Neurological: He is alert and oriented to person, place, and time.   Psychiatric: He has a normal mood and affect. His behavior is normal. Judgment and thought content normal.             Musculoskeletal:  Nontender to palpation today.  No scars abrasions or lacerations.  Good elbow, wrist, and finger range of motion.  Active right shoulder range of motion is decreased, forward flexion to approximately 100°, abduction to approximately 90°.  Passive range of motion of the right shoulder with good FF and abduction. Crepitus noted with motion.  Right shoulder weakness noted.  " Neurovascularly intact-good sensation and motor function, 2+ radial pulses.    RADIOGRAPHS:  Right Shoulder X-Ray, 1/25/2018  AP, scapular Y, and axillary radiographs of the right shoulder were obtained.  The bones are intact.  There is no evidence for acute fracture or bone destruction.  There is no evidence for dislocation.  There are mild degenerative changes of the acromioclavicular joint identified.  Soft tissues are unremarkable.   Impression   No evidence for acute fracture, bone destruction, or dislocation.  Mild degenerative changes.     Comments: I have personally reviewed the imaging and I agree with the above radiologist's report.    ASSESSMENT/PLAN:   Viktor was seen today for pain.    Diagnoses and all orders for this visit:    Right shoulder pain, unspecified chronicity    Decreased right shoulder range of motion           - We talked at length about the anatomy and pathophysiology of   Encounter Diagnoses   Name Primary?    Right shoulder pain, unspecified chronicity Yes    Decreased right shoulder range of motion        - Discussed conservative treatment options for right shoulder pain and decreased range of motion.  - Continue HEP and restart PT  - Tylenol for pain  - Follow up with PCP for gout maintenance   - Call with questions or concerns

## 2018-05-17 NOTE — LETTER
May 17, 2018      Kalia Jones MD  [address]  [address]  [facility] [address]           [facility]  [address], [address]  [facility] [address]  Phone: [phone]          Patient: Viktor Townsend Sr.   MR Number: [id number]   YOB: 1933   Date of Visit: 5/17/2018       Dear Dr. Kalia Jones:    Thank you for referring Viktor Townsend to me for evaluation. Attached you will find relevant portions of my assessment and plan of care.    If you have questions, please do not hesitate to call me. I look forward to following Viktor Townsend along with you.    Sincerely,    MARV John    Enclosure  CC:  No Recipients    If you would like to receive this communication electronically, please contact [email or web address] or [phone] to request more information on [employer] access.    For providers and/or their staff who would like to refer a patient to [facility], please contact us through our one-stop-shop provider referral line, [facility], at [phone].    If you feel you have received this communication in error or would no longer like to receive these types of communications, please e-mail [email or web address]

## 2018-06-30 ENCOUNTER — OFFICE VISIT (OUTPATIENT)
Dept: URGENT CARE | Facility: CLINIC | Age: 83
End: 2018-06-30
Payer: MEDICARE

## 2018-06-30 ENCOUNTER — NURSE TRIAGE (OUTPATIENT)
Dept: ADMINISTRATIVE | Facility: CLINIC | Age: 83
End: 2018-06-30

## 2018-06-30 VITALS
RESPIRATION RATE: 18 BRPM | HEIGHT: 67 IN | BODY MASS INDEX: 29.19 KG/M2 | TEMPERATURE: 98 F | SYSTOLIC BLOOD PRESSURE: 134 MMHG | OXYGEN SATURATION: 98 % | DIASTOLIC BLOOD PRESSURE: 79 MMHG | HEART RATE: 66 BPM | WEIGHT: 186 LBS

## 2018-06-30 DIAGNOSIS — M25.522 LEFT ELBOW PAIN: Primary | ICD-10-CM

## 2018-06-30 PROCEDURE — 96372 THER/PROPH/DIAG INJ SC/IM: CPT | Mod: S$GLB,,, | Performed by: EMERGENCY MEDICINE

## 2018-06-30 PROCEDURE — 3075F SYST BP GE 130 - 139MM HG: CPT | Mod: CPTII,S$GLB,, | Performed by: EMERGENCY MEDICINE

## 2018-06-30 PROCEDURE — 3078F DIAST BP <80 MM HG: CPT | Mod: CPTII,S$GLB,, | Performed by: EMERGENCY MEDICINE

## 2018-06-30 PROCEDURE — 99214 OFFICE O/P EST MOD 30 MIN: CPT | Mod: 25,S$GLB,, | Performed by: EMERGENCY MEDICINE

## 2018-06-30 RX ORDER — DEXAMETHASONE SODIUM PHOSPHATE 100 MG/10ML
10 INJECTION INTRAMUSCULAR; INTRAVENOUS
Status: COMPLETED | OUTPATIENT
Start: 2018-06-30 | End: 2018-06-30

## 2018-06-30 RX ADMIN — DEXAMETHASONE SODIUM PHOSPHATE 10 MG: 100 INJECTION INTRAMUSCULAR; INTRAVENOUS at 02:06

## 2018-06-30 NOTE — TELEPHONE ENCOUNTER
Reason for Disposition   [1] MODERATE pain (e.g., interferes with normal activities, limping) AND [2] present > 3 days   Swollen joint    Protocols used: ST FOOT PAIN-A-AH, ST ELBOW PAIN-A-AH    Pt c/o gout to L elbow and L 1st toe x 3 days.  Pt advised per protocol and verbalizes understanding.  Pt given directions to nearest .

## 2018-06-30 NOTE — PROGRESS NOTES
"Subjective:       Patient ID: Viktor Townsend Sr. is a 85 y.o. male.    Vitals:  height is 5' 7" (1.702 m) and weight is 84.4 kg (186 lb). His temperature is 98.1 °F (36.7 °C). His blood pressure is 134/79 and his pulse is 66. His respiration is 18 and oxygen saturation is 98%.     Chief Complaint: Pain    Pt c/o left elbow pain       Pain   This is a new problem. The current episode started in the past 7 days. The problem occurs constantly. The problem has been gradually worsening. Associated symptoms include joint swelling. Pertinent negatives include no abdominal pain, chest pain, chills, fever, headaches, nausea, rash, sore throat or vomiting. The symptoms are aggravated by bending. He has tried nothing for the symptoms. The treatment provided no relief.     Review of Systems   Constitution: Negative for chills and fever.   HENT: Negative for sore throat.    Eyes: Negative for blurred vision.   Cardiovascular: Negative for chest pain.   Respiratory: Negative for shortness of breath.    Skin: Negative for rash.   Musculoskeletal: Positive for gout, joint pain and joint swelling. Negative for back pain.   Gastrointestinal: Negative for abdominal pain, diarrhea, nausea and vomiting.   Neurological: Negative for headaches.   Psychiatric/Behavioral: The patient is not nervous/anxious.        Objective:      Physical Exam   Constitutional: He is oriented to person, place, and time. He appears well-developed and well-nourished. He does not appear ill. No distress.   HENT:   Head: Normocephalic and atraumatic.   Cardiovascular: Normal rate.    Pulses:       Radial pulses are 2+ on the right side, and 2+ on the left side.   Pulmonary/Chest: Effort normal.   Musculoskeletal:        Left elbow: He exhibits swelling. Tenderness found.        Arms:  Neurological: He is alert and oriented to person, place, and time.   Skin: Skin is warm and dry.   Nursing note and vitals reviewed.      Assessment:       1. Left elbow pain     "    Plan:         Left elbow pain  -     dexamethasone injection 10 mg; Inject 1 mL (10 mg total) into the muscle one time.    Bursitis vs gout.  He states he's had gout in the past, treated with colcrys and allopurinol.  I will avoid NSAIDs given CKD III, I have advised him to use tylenol prn, f/u with his PCP this week for assessment, possibly uric acid level    Follow up with primary care provider if not improved  Go to ER for new, worse or concerning symptoms

## 2018-06-30 NOTE — PATIENT INSTRUCTIONS
Follow up with primary care provider if not improved  Go to ER for new, worse or concerning symptoms    Bursitis of the Elbow (Olecranon)  Your elbow joint contains a small fluid-filled sac called a bursa. The bursa helps the muscles and tendons move smoothly over the bone. It also cushions and protects your elbow. Bursitis is when the bursa is inflamed or swollen. This is most often due to overuse of or injury to the elbow. Symptoms include swelling and pain. If the elbow is red and feels warm to the touch, the bursa itself may be infected.  In most cases, elbow bursitis resolves with medicine and self-care at home. It may take several weeks for the bursa to heal and the swelling to go away. In some cases, your healthcare provider may drain excess fluid from the bursa. Or, he or she may inject medicine directly into the bursa to help relieve symptoms. In severe cases, you may need surgery to remove the bursa may. If there is concern that the bursa is infected, your healthcare provider may prescribe antibiotics to treat the infection.    Home care  Your healthcare provider may prescribe medicine to help relieve pain and swelling. This may be an over-the-counter pain reliever or prescription pain medicine. Take all medicines as directed. To help treat or prevent infection, your provider may prescribe antibiotics. If these are prescribed, take them as directed until they are gone.  The following are general care guidelines:  · Apply an ice pack or bag of frozen peas wrapped in a thin towel to your elbow for 15 to 20 minutes at a time. Do this 3 to 4 times a day until pain and swelling improve.  · Keep your elbow raised above the level of your heart whenever possible. This helps reduce swelling. When sitting or lying down, place your arm on a pillow that rests on your chest or on a pillow at your side.  · Use an elastic wrap around the elbow joint to compress the area while it is healing. Make the wrap snug but not  tight to the point of causing pain.  · Rest your elbow to give it time to heal. You may need to wear an elbow pad to help protect and limit the movement of your elbow. During and after healing, avoid leaning on your elbows.  Follow-up care  Follow up with your healthcare provider, or as advised. If you have been referred to a specialist, make that appointment promptly.  When to seek medical advice  Call your healthcare provider right away if any of these occur:  · Fever of 100.4°F (38°C) or higher, or as advised  · Chills  · Increased pain, swelling, warmth, redness, or drainage from the joint  · Trouble moving the elbow joint  · Numbness or tingling in the hand  · Severe pain or swelling in forearm or hand  · Loss of pink color and slow return of color after squeezing fingertip or hand  Date Last Reviewed: 6/1/2016  © 5262-2677 [employer] [address] [place] [address], [place] [address]. All rights reserved. This information is not intended as a substitute for professional medical care. Always follow your healthcare professional's instructions.

## 2018-07-02 NOTE — TELEPHONE ENCOUNTER
Spoke with pt and he states he did receive a shot at a [facility] and he does want to schedule a f/u appt but he will have to call back when he gets his days straight.

## 2018-07-17 ENCOUNTER — LAB VISIT (OUTPATIENT)
Dept: LAB | Facility: OTHER | Age: 83
End: 2018-07-17
Attending: FAMILY MEDICINE
Payer: MEDICARE

## 2018-07-17 ENCOUNTER — OFFICE VISIT (OUTPATIENT)
Dept: INTERNAL MEDICINE | Facility: CLINIC | Age: 83
End: 2018-07-17
Attending: FAMILY MEDICINE
Payer: MEDICARE

## 2018-07-17 VITALS
DIASTOLIC BLOOD PRESSURE: 73 MMHG | OXYGEN SATURATION: 99 % | HEIGHT: 67 IN | WEIGHT: 188.69 LBS | HEART RATE: 70 BPM | SYSTOLIC BLOOD PRESSURE: 142 MMHG | BODY MASS INDEX: 29.62 KG/M2

## 2018-07-17 DIAGNOSIS — R60.0 HAND EDEMA: ICD-10-CM

## 2018-07-17 DIAGNOSIS — N18.30 CKD (CHRONIC KIDNEY DISEASE) STAGE 3, GFR 30-59 ML/MIN: ICD-10-CM

## 2018-07-17 DIAGNOSIS — G89.29 CHRONIC RIGHT SHOULDER PAIN: ICD-10-CM

## 2018-07-17 DIAGNOSIS — M25.511 CHRONIC RIGHT SHOULDER PAIN: ICD-10-CM

## 2018-07-17 DIAGNOSIS — M1A.09X0 IDIOPATHIC CHRONIC GOUT OF MULTIPLE SITES WITHOUT TOPHUS: Primary | ICD-10-CM

## 2018-07-17 LAB
ALBUMIN SERPL BCP-MCNC: 3.7 G/DL
ALP SERPL-CCNC: 85 U/L
ALT SERPL W/O P-5'-P-CCNC: 7 U/L
ANION GAP SERPL CALC-SCNC: 6 MMOL/L
AST SERPL-CCNC: 12 U/L
BASOPHILS # BLD AUTO: 0.09 K/UL
BASOPHILS NFR BLD: 0.9 %
BILIRUB SERPL-MCNC: 1.2 MG/DL
BUN SERPL-MCNC: 43 MG/DL
CALCIUM SERPL-MCNC: 9.2 MG/DL
CHLORIDE SERPL-SCNC: 107 MMOL/L
CO2 SERPL-SCNC: 23 MMOL/L
CREAT SERPL-MCNC: 2.1 MG/DL
CRP SERPL-MCNC: 3.7 MG/L
DIFFERENTIAL METHOD: ABNORMAL
EOSINOPHIL # BLD AUTO: 0.2 K/UL
EOSINOPHIL NFR BLD: 2.1 %
ERYTHROCYTE [DISTWIDTH] IN BLOOD BY AUTOMATED COUNT: 15.6 %
ERYTHROCYTE [SEDIMENTATION RATE] IN BLOOD: 42 MM/HR
EST. GFR  (AFRICAN AMERICAN): 32 ML/MIN/1.73 M^2
EST. GFR  (NON AFRICAN AMERICAN): 28 ML/MIN/1.73 M^2
GLUCOSE SERPL-MCNC: 94 MG/DL
HCT VFR BLD AUTO: 36.2 %
HGB BLD-MCNC: 11.4 G/DL
LYMPHOCYTES # BLD AUTO: 2.7 K/UL
LYMPHOCYTES NFR BLD: 26.5 %
MCH RBC QN AUTO: 28.1 PG
MCHC RBC AUTO-ENTMCNC: 31.5 G/DL
MCV RBC AUTO: 89 FL
MONOCYTES # BLD AUTO: 0.8 K/UL
MONOCYTES NFR BLD: 7.3 %
NEUTROPHILS # BLD AUTO: 6.5 K/UL
NEUTROPHILS NFR BLD: 62.9 %
PLATELET # BLD AUTO: 198 K/UL
PMV BLD AUTO: 10.5 FL
POTASSIUM SERPL-SCNC: 4.6 MMOL/L
PROT SERPL-MCNC: 7.1 G/DL
RBC # BLD AUTO: 4.06 M/UL
SODIUM SERPL-SCNC: 136 MMOL/L
URATE SERPL-MCNC: 4 MG/DL
WBC # BLD AUTO: 10.26 K/UL

## 2018-07-17 PROCEDURE — 85651 RBC SED RATE NONAUTOMATED: CPT

## 2018-07-17 PROCEDURE — 3077F SYST BP >= 140 MM HG: CPT | Mod: CPTII,S$GLB,, | Performed by: FAMILY MEDICINE

## 2018-07-17 PROCEDURE — 99999 PR PBB SHADOW E&M-EST. PATIENT-LVL III: CPT | Mod: PBBFAC,,, | Performed by: FAMILY MEDICINE

## 2018-07-17 PROCEDURE — 3078F DIAST BP <80 MM HG: CPT | Mod: CPTII,S$GLB,, | Performed by: FAMILY MEDICINE

## 2018-07-17 PROCEDURE — 80053 COMPREHEN METABOLIC PANEL: CPT

## 2018-07-17 PROCEDURE — 99214 OFFICE O/P EST MOD 30 MIN: CPT | Mod: S$GLB,,, | Performed by: FAMILY MEDICINE

## 2018-07-17 PROCEDURE — 84550 ASSAY OF BLOOD/URIC ACID: CPT

## 2018-07-17 PROCEDURE — 36415 COLL VENOUS BLD VENIPUNCTURE: CPT

## 2018-07-17 PROCEDURE — 85025 COMPLETE CBC W/AUTO DIFF WBC: CPT

## 2018-07-17 PROCEDURE — 86140 C-REACTIVE PROTEIN: CPT

## 2018-07-17 RX ORDER — COLCHICINE 0.6 MG/1
0.6 TABLET ORAL 2 TIMES DAILY PRN
Qty: 40 TABLET | Refills: 1 | Status: SHIPPED | OUTPATIENT
Start: 2018-07-17 | End: 2018-09-26

## 2018-07-17 RX ORDER — CLINDAMYCIN HYDROCHLORIDE 300 MG/1
CAPSULE ORAL
Refills: 0 | COMMUNITY
Start: 2018-04-16 | End: 2018-09-26

## 2018-07-17 NOTE — MEDICAL/APP STUDENT
Subjective:       Patient ID: Viktor Townsend Sr. is a 85 y.o. male.    Chief Complaint: Shoulder Pain (right)    Mr. Townsend is an 86 y/o M with a PMH of gout and HTN who has presented today for recurrent flares of gout as well as right shoulder pain. Mr. Townsend reports that 2 weeks ago his left elbow become severely swollen and painful. He went to an urgent care where they diagnosed the swelling as gout, and gave him a gluteal shot of cortisone. They could not perform lab tests on the weekend and suggested he follow up with his PCP for the recurrent episodes of gout. He has had recurrent flare-ups in his right hand, his left elbow, and his left great toe. He has been prescribed colchicine in the past which he ceased taking. He takes allopurinol every day.    Mr. Townsend also reports a fall onto his right shoulder in December 2017. An xray of the right shoulder showed no abnormalities, and he was referred to physical therapy. He attended physical therapy for 2 months with no relief of the pain and difficulty with shoulder movement. 1 month ago he was given a steroid shot into the right shoulder, which did not relieve the pain and discomfort. Mr. Townsend reports that he is unable to sleep on his right shoulder, unable to lift his right arm above 90 degrees, and has pain with movement.     Mr. Townsend's blood pressure is well controlled at this itme.       Review of Systems   Constitutional: Negative for activity change, appetite change, chills and fever.   HENT: Negative for congestion, rhinorrhea and sneezing.    Respiratory: Negative for cough, chest tightness, shortness of breath and wheezing.    Cardiovascular: Negative for chest pain, palpitations and leg swelling.   Gastrointestinal: Negative for abdominal pain, blood in stool, diarrhea and vomiting.   Genitourinary: Negative for dysuria, flank pain and frequency.   Musculoskeletal: Positive for arthralgias, joint swelling and myalgias. Negative for neck  "pain and neck stiffness.   Skin: Negative for color change and pallor.   Neurological: Negative for dizziness, weakness, light-headedness and headaches.   Hematological: Negative for adenopathy. Does not bruise/bleed easily.   Psychiatric/Behavioral: Negative for agitation and behavioral problems.       Objective:     BP (!) 142/73   Pulse 70   Ht 5' 7" (1.702 m)   Wt 85.6 kg (188 lb 11.4 oz)   SpO2 99%   BMI 29.56 kg/m²     Physical Exam   Constitutional: He is oriented to person, place, and time. He appears well-developed and well-nourished. No distress.   HENT:   Head: Normocephalic and atraumatic.   Eyes: EOM are normal. Pupils are equal, round, and reactive to light.   Neck: Normal range of motion. Neck supple.   Cardiovascular: Normal rate, regular rhythm, normal heart sounds and intact distal pulses.    Pulmonary/Chest: Effort normal and breath sounds normal. No respiratory distress. He has no wheezes.   Abdominal: Soft. Bowel sounds are normal. He exhibits no distension. There is no tenderness.   Musculoskeletal: He exhibits no edema, tenderness or deformity.   Right shoulder range of motion limited. Patient able to abduct right arm to 90 degrees. Reports that pain begins at 90 degrees and he is unable to lift arm further. Passive movement of arm further than 90 degrees reproduces pain.  No swelling or erythema noted in left elbow at this time.   Lymphadenopathy:     He has no cervical adenopathy.   Neurological: He is alert and oriented to person, place, and time. No cranial nerve deficit.   Skin: Skin is warm and dry. Capillary refill takes less than 2 seconds. No rash noted. He is not diaphoretic. No erythema. No pallor.   Psychiatric: He has a normal mood and affect. His behavior is normal.       Assessment:       1. Chronic right shoulder pain    2. Hand edema    3. CKD (chronic kidney disease) stage 3, GFR 30-59 ml/min        Plan:       Mr. Townsend is an 84 y/o M with a PMH of gout and HTN who " has presented today for investigation of recurrent episodes of gout as well as chronic right shoulder pain.    1. Gout  - CBC, CMP ordered  - ESR, CRP ordered  - Uric Acid Level ordered  - Follow-up with results  - Prescribed colchicine 0.6 mg - 1 tablet 2x per day  - Continue allopurinol 300mg tablet 1x per day    2. Right Shoulder Pain  - Referral to orthopedics - [facility]  - Consider MRI for possible rotator cuff tear  - Continue tylenol PRN    3. HTN  - /73 today  - Continue lisinopril/hydrochlorothiazide 20-25mg

## 2018-07-17 NOTE — PROGRESS NOTES
Subjective:       Patient ID: Viktor Townsend Sr. is a 85 y.o. male.     Chief Complaint: Shoulder Pain (right)     Mr. Townsend is an 84 y/o M with a PMH of gout and HTN who has presented today for recurrent flares of gout as well as right shoulder pain. Mr. Townsend reports that 2 weeks ago his left elbow become severely swollen and painful. He went to an urgent care where they diagnosed the swelling as gout, and gave him a gluteal shot of cortisone. They could not perform lab tests on the weekend and suggested he follow up with his PCP for the recurrent episodes of gout. He has had recurrent flare-ups in his right hand, his left elbow, and his left great toe. He has been prescribed colchicine in the past which he ceased taking. He takes allopurinol every day.     Mr. Townsend also reports a fall onto his right shoulder in December 2017. An xray of the right shoulder showed no abnormalities, and he was referred to physical therapy. He attended physical therapy for 2 months with no relief of the pain and difficulty with shoulder movement. 1 month ago he was given a steroid shot into the right shoulder, which did not relieve the pain and discomfort. Mr. Townsend reports that he is unable to sleep on his right shoulder, unable to lift his right arm above 90 degrees, and has pain with movement.      Mr. Townsend's blood pressure is well controlled at this itme.      Review of Systems   Constitutional: Negative for activity change, appetite change, chills and fever.   HENT: Negative for congestion, rhinorrhea and sneezing.    Respiratory: Negative for cough, chest tightness, shortness of breath and wheezing.    Cardiovascular: Negative for chest pain, palpitations and leg swelling.   Gastrointestinal: Negative for abdominal pain, blood in stool, diarrhea and vomiting.   Genitourinary: Negative for dysuria, flank pain and frequency.   Musculoskeletal: Positive for arthralgias, joint swelling and myalgias. Negative for  "neck pain and neck stiffness.   Skin: Negative for color change and pallor.   Neurological: Negative for dizziness, weakness, light-headedness and headaches.   Hematological: Negative for adenopathy. Does not bruise/bleed easily.   Psychiatric/Behavioral: Negative for agitation and behavioral problems.       Objective:     BP (!) 142/73   Pulse 70   Ht 5' 7" (1.702 m)   Wt 85.6 kg (188 lb 11.4 oz)   SpO2 99%   BMI 29.56 kg/m²      Physical Exam   Constitutional: He is oriented to person, place, and time. He appears well-developed and well-nourished. No distress.   HENT:   Head: Normocephalic and atraumatic.   Eyes: EOM are normal. Pupils are equal, round, and reactive to light.   Neck: Normal range of motion. Neck supple.   Cardiovascular: Normal rate, regular rhythm, normal heart sounds and intact distal pulses.    Pulmonary/Chest: Effort normal and breath sounds normal. No respiratory distress. He has no wheezes.   Abdominal: Soft. Bowel sounds are normal. He exhibits no distension. There is no tenderness.   Musculoskeletal: He exhibits no edema, tenderness or deformity.   Right shoulder range of motion limited. Patient able to abduct right arm to 90 degrees. Reports that pain begins at 90 degrees and he is unable to lift arm further. Passive movement of arm further than 90 degrees reproduces pain.  No swelling or erythema noted in left elbow at this time.   Lymphadenopathy:     He has no cervical adenopathy.   Neurological: He is alert and oriented to person, place, and time. No cranial nerve deficit.   Skin: Skin is warm and dry. Capillary refill takes less than 2 seconds. No rash noted. He is not diaphoretic. No erythema. No pallor.   Psychiatric: He has a normal mood and affect. His behavior is normal.       Assessment:       1. Chronic right shoulder pain    2. Hand edema    3. CKD (chronic kidney disease) stage 3, GFR 30-59 ml/min        Plan:       Mr. Townsend is an 84 y/o M with a PMH of gout and HTN " who has presented today for investigation of recurrent episodes of gout as well as chronic right shoulder pain.     1. Gout  - CBC, CMP ordered  - ESR, CRP ordered  - Uric Acid Level ordered  - Follow-up with results  - Prescribed colchicine 0.6 mg - 1 tablet 2x per day  - Continue allopurinol 300mg tablet 1x per day     2. Right Shoulder Pain  - Referral to orthopedics - [facility]  - Consider MRI for possible rotator cuff tear  - Continue tylenol PRN     3. HTN  - /73 today  - Continue lisinopril/hydrochlorothiazide 20-25mg

## 2018-07-18 ENCOUNTER — TELEPHONE (OUTPATIENT)
Dept: INTERNAL MEDICINE | Facility: CLINIC | Age: 83
End: 2018-07-18

## 2018-07-18 NOTE — TELEPHONE ENCOUNTER
Called pt to advise regarding his lab results. Pt also was able to schedule appt. To see nephrologist as also advised.The patient verbalized understanding and had no further questions.

## 2018-07-18 NOTE — TELEPHONE ENCOUNTER
----- Message from Kalia Jones MD sent at 7/18/2018  7:42 AM CDT -----  Blood work shows that gout is becoming well controlled.  He does need to see a nephrologist his kidney function is stable but not 100%.  No changes in medications.  Followup as scheduled with hand surgery.

## 2018-08-10 ENCOUNTER — OFFICE VISIT (OUTPATIENT)
Dept: ORTHOPEDICS | Facility: CLINIC | Age: 83
End: 2018-08-10
Attending: FAMILY MEDICINE
Payer: MEDICARE

## 2018-08-10 ENCOUNTER — TELEPHONE (OUTPATIENT)
Dept: INTERNAL MEDICINE | Facility: CLINIC | Age: 83
End: 2018-08-10

## 2018-08-10 VITALS
HEIGHT: 67 IN | SYSTOLIC BLOOD PRESSURE: 115 MMHG | HEART RATE: 67 BPM | WEIGHT: 188 LBS | DIASTOLIC BLOOD PRESSURE: 65 MMHG | BODY MASS INDEX: 29.51 KG/M2

## 2018-08-10 DIAGNOSIS — R19.7 DIARRHEA, UNSPECIFIED TYPE: Primary | ICD-10-CM

## 2018-08-10 DIAGNOSIS — M25.611 DECREASED RIGHT SHOULDER RANGE OF MOTION: ICD-10-CM

## 2018-08-10 DIAGNOSIS — M25.511 RIGHT SHOULDER PAIN, UNSPECIFIED CHRONICITY: Primary | ICD-10-CM

## 2018-08-10 PROCEDURE — 99999 PR PBB SHADOW E&M-EST. PATIENT-LVL IV: CPT | Mod: PBBFAC,,, | Performed by: PHYSICIAN ASSISTANT

## 2018-08-10 PROCEDURE — 3074F SYST BP LT 130 MM HG: CPT | Mod: CPTII,S$GLB,, | Performed by: PHYSICIAN ASSISTANT

## 2018-08-10 PROCEDURE — 3078F DIAST BP <80 MM HG: CPT | Mod: CPTII,S$GLB,, | Performed by: PHYSICIAN ASSISTANT

## 2018-08-10 PROCEDURE — 99213 OFFICE O/P EST LOW 20 MIN: CPT | Mod: 25,S$GLB,, | Performed by: PHYSICIAN ASSISTANT

## 2018-08-10 PROCEDURE — 20610 DRAIN/INJ JOINT/BURSA W/O US: CPT | Mod: RT,S$GLB,, | Performed by: PHYSICIAN ASSISTANT

## 2018-08-10 RX ORDER — BUPIVACAINE HYDROCHLORIDE 2.5 MG/ML
4 INJECTION, SOLUTION INFILTRATION; PERINEURAL
Status: COMPLETED | OUTPATIENT
Start: 2018-08-10 | End: 2018-08-10

## 2018-08-10 RX ORDER — TRIAMCINOLONE ACETONIDE 40 MG/ML
80 INJECTION, SUSPENSION INTRA-ARTICULAR; INTRAMUSCULAR
Status: COMPLETED | OUTPATIENT
Start: 2018-08-10 | End: 2018-08-10

## 2018-08-10 RX ADMIN — TRIAMCINOLONE ACETONIDE 80 MG: 40 INJECTION, SUSPENSION INTRA-ARTICULAR; INTRAMUSCULAR at 01:08

## 2018-08-10 RX ADMIN — BUPIVACAINE HYDROCHLORIDE 10 MG: 2.5 INJECTION, SOLUTION INFILTRATION; PERINEURAL at 01:08

## 2018-08-10 NOTE — LETTER
August 10, 2018      Kalia Jones MD  [address]  [address]  [facility] [address]           [facility]  [address], [address]  [facility] [address]  Phone: [phone]          Patient: Viktor Townsend Sr.   MR Number: [id number]   YOB: 1933   Date of Visit: 8/10/2018       Dear Dr. Kalia Jones:    Thank you for referring Viktor Townsend to me for evaluation. Attached you will find relevant portions of my assessment and plan of care.    If you have questions, please do not hesitate to call me. I look forward to following Viktor Townsend along with you.    Sincerely,    MARV John    Enclosure  CC:  No Recipients    If you would like to receive this communication electronically, please contact [email or web address] or [phone] to request more information on [facility] Link access.    For providers and/or their staff who would like to refer a patient to [facility], please contact us through our one-stop-shop provider referral line, [facility], at [phone].    If you feel you have received this communication in error or would no longer like to receive these types of communications, please e-mail [email or web address]

## 2018-08-10 NOTE — PROGRESS NOTES
"Subjective:      Patient ID: Viktor Townsend Sr. is a 85 y.o. male.    Chief Complaint: Pain of the Right Shoulder      HPI  Viktor Townsend Sr. is a right hand dominant 85 y.o. male presenting today for follow up of right shoulder pain. He attended PT and is performing his HEP three times/day.  Voltaren gel denied by insurance.  Pain is reported as 2/10.  Pain is increased with motion.  He also reports "clicking with shoulder motion."    He underwent steroid injection at his visit in 3/2018, he says that it helped.   Since has gout, recent flare in his right hand, improved with colchicine and allopurinol.  There was a history of trauma to the right shoulder.  Onset of symptoms began late December 2017.  He was picking up some groceries, tripped, and fell onto the right shoulder.  He still has some discomfort with sleeping on the right side. He does take occasional Tylenol, which provides some relief.     Patient happened to mention seen his PCP recently for an infection, he was placed on an antibiotic which he started taking.  He stopped taking the antibiotic after he developed diarrhea.  He reports that he has continued to have 2 weeks of watery diarrhea.        Review of patient's allergies indicates:   Allergen Reactions    Oranges [orange] Shortness Of Breath         Current Outpatient Prescriptions   Medication Sig Dispense Refill    acetaminophen (TYLENOL) 325 MG tablet Take 325 mg by mouth every 6 (six) hours as needed for Pain.      allopurinol (ZYLOPRIM) 300 MG tablet Take 1 tablet (300 mg total) by mouth once daily. 30 tablet 5    calcium carbonate (TUMS) 200 mg calcium (500 mg) chewable tablet Take 1 tablet by mouth 3 (three) times daily as needed for Heartburn.      clindamycin (CLEOCIN) 300 MG capsule   0    colchicine 0.6 mg tablet Take 1 tablet (0.6 mg total) by mouth 2 (two) times daily as needed. 40 tablet 1    COLCRYS 0.6 mg tablet TAKE 1 TABLET(0.6 MG) BY MOUTH TWICE DAILY 30 tablet 0    " "diclofenac sodium 1 % Gel Apply 2 g topically 2 (two) times daily. Apply to shoulder in area of pain 1 Tube 2    hydrOXYzine HCl (ATARAX) 25 MG tablet Take 1 tablet (25 mg total) by mouth 3 (three) times daily as needed for Itching. 30 tablet 0    levocetirizine (XYZAL) 5 MG tablet Take 1 tablet (5 mg total) by mouth every evening. 30 tablet 11    lisinopril-hydrochlorothiazide (PRINZIDE,ZESTORETIC) 20-25 mg Tab Take 1 tablet by mouth once daily. 90 tablet 3    pravastatin (PRAVACHOL) 20 MG tablet Take 1 tablet (20 mg total) by mouth once daily. 90 tablet 3     No current facility-administered medications for this visit.        Past Medical History:   Diagnosis Date    CKD (chronic kidney disease)     Colon polyps     Gout     Hiatal hernia     Hypertension     Lumbar back pain     Personal history of asbestosis        Past Surgical History:   Procedure Laterality Date    CHOLECYSTECTOMY      EYE SURGERY      PROSTATE SURGERY           Review of Systems:  Review of Systems   Constitution: Negative for chills and fever.   Skin: Negative for rash and suspicious lesions.   Musculoskeletal:        See HPI   Neurological: Negative for dizziness, headaches, light-headedness, numbness and paresthesias.   Psychiatric/Behavioral: Negative for depression. The patient is not nervous/anxious.          OBJECTIVE:     PHYSICAL EXAM:  Height: 5' 7" (170.2 cm) Weight: 85.3 kg (188 lb)     Vitals:    08/10/18 1257   BP: 115/65   Pulse: 67     General    Vitals reviewed.  Constitutional: He is oriented to person, place, and time. He appears well-developed and well-nourished.   HENT:   Head: Normocephalic and atraumatic.   Neck: Normal range of motion.   Cardiovascular: Normal rate.    Pulmonary/Chest: Effort normal. No respiratory distress.   Neurological: He is alert and oriented to person, place, and time.   Psychiatric: He has a normal mood and affect. His behavior is normal. Judgment and thought content normal. "             Musculoskeletal:  Nontender to palpation today.  No scars abrasions or lacerations.  Good elbow, wrist, and finger range of motion.  Active right shoulder range of motion is decreased, forward flexion to approximately 120°, abduction to approximately 100°, decreased ER and IR.  Passive range of motion of the right shoulder with good FF and abduction. Crepitus noted with motion.  Right shoulder weakness noted.  Neurovascularly intact-good sensation and motor function, 2+ radial pulses.    RADIOGRAPHS:  Right Shoulder X-Ray, 1/25/2018  AP, scapular Y, and axillary radiographs of the right shoulder were obtained.  The bones are intact.  There is no evidence for acute fracture or bone destruction.  There is no evidence for dislocation.  There are mild degenerative changes of the acromioclavicular joint identified.  Soft tissues are unremarkable.   Impression   No evidence for acute fracture, bone destruction, or dislocation.  Mild degenerative changes.     Comments: I have personally reviewed the imaging and I agree with the above radiologist's report.    ASSESSMENT/PLAN:   Viktor was seen today for pain.    Diagnoses and all orders for this visit:    Right shoulder pain, unspecified chronicity    Decreased right shoulder range of motion           - We talked at length about the anatomy and pathophysiology of   Encounter Diagnoses   Name Primary?    Right shoulder pain, unspecified chronicity Yes    Decreased right shoulder range of motion        - Discussed conservative treatment options for right shoulder pain and decreased range of motion.  - Repeat shoulder injection today  - Continue HEP   - Tylenol for pain  - Follow up with PCP for gout maintenance; message sent to PCP about watery diarrhea   - Call with questions or concerns    PROCEDURE NOTE:  I have explained the risks, benefits, and alternatives of the procedure in detail.  The patient voices understanding and all questions have been answered.   The patient agrees to proceed as planned, consents to injection. Pause for timeout. After a sterile prep of the skin performed with rubbing alcohol and betadine, the right shoulder is injected from the posterior approach using a 22 gauge needle with a combination of 4cc 0.25% marcaine and 80 mg of kenalog. The patient is cautioned and immediate relief of pain is secondary to the local anesthetic and will be temporary.  After the anesthetic wears off there may be a increase in pain that may last for a few hours or a few days and they should use ice to help alleviate this flair up of pain.

## 2018-08-13 ENCOUNTER — TELEPHONE (OUTPATIENT)
Dept: INTERNAL MEDICINE | Facility: CLINIC | Age: 83
End: 2018-08-13

## 2018-08-13 NOTE — TELEPHONE ENCOUNTER
Colchicine can definitely cause diarrhea.  I am happy to hear that he is getting better.  Regarding the hand if the pain is due to gout than we could start him on Indocin 50 mg p.o. b.i.d..

## 2018-08-13 NOTE — TELEPHONE ENCOUNTER
----- Message from Kalia Jones MD sent at 8/13/2018  9:10 AM CDT -----  Regarding: FW: poss C Diff  What Abx and when was he on it?  Is he better now?  ----- Message -----  From: Sonali Colbert MA  Sent: 8/10/2018   2:33 PM  To: Kalia Jones MD  Subject: FW: poss C Diff                                      ----- Message -----  From: MARV John  Sent: 8/10/2018   1:22 PM  To: Karen CABAN Staff  Subject: geovanna Yin                                      Saw this patient today - he mentioned seen Dr. Jones recently and starting the antibiotic for an infection.  He said that he started to get diarrhea and stopped taking the antibiotic, he has continued to have 2 weeks of watery diarrhea.  I told the patient that he needed to reach out to our office - and concerned he may have C. Diff.    Thanks,  Precious Phelan PA-C

## 2018-09-04 ENCOUNTER — OFFICE VISIT (OUTPATIENT)
Dept: NEPHROLOGY | Facility: CLINIC | Age: 83
End: 2018-09-04
Payer: MEDICARE

## 2018-09-04 VITALS
BODY MASS INDEX: 29.58 KG/M2 | SYSTOLIC BLOOD PRESSURE: 116 MMHG | OXYGEN SATURATION: 98 % | HEART RATE: 72 BPM | HEIGHT: 67 IN | DIASTOLIC BLOOD PRESSURE: 60 MMHG | WEIGHT: 188.5 LBS

## 2018-09-04 DIAGNOSIS — I12.9 HYPERTENSIVE KIDNEY DISEASE WITH STAGE 3 CHRONIC KIDNEY DISEASE: ICD-10-CM

## 2018-09-04 DIAGNOSIS — N18.30 HYPERTENSIVE KIDNEY DISEASE WITH STAGE 3 CHRONIC KIDNEY DISEASE: ICD-10-CM

## 2018-09-04 DIAGNOSIS — Z85.46 PERSONAL HISTORY OF PROSTATE CANCER: ICD-10-CM

## 2018-09-04 DIAGNOSIS — N18.30 CKD (CHRONIC KIDNEY DISEASE) STAGE 3, GFR 30-59 ML/MIN: Primary | ICD-10-CM

## 2018-09-04 PROCEDURE — 99999 PR PBB SHADOW E&M-EST. PATIENT-LVL III: CPT | Mod: PBBFAC,,, | Performed by: INTERNAL MEDICINE

## 2018-09-04 PROCEDURE — 99204 OFFICE O/P NEW MOD 45 MIN: CPT | Mod: S$PBB,,, | Performed by: INTERNAL MEDICINE

## 2018-09-04 PROCEDURE — 3074F SYST BP LT 130 MM HG: CPT | Mod: CPTII,,, | Performed by: INTERNAL MEDICINE

## 2018-09-04 PROCEDURE — 3078F DIAST BP <80 MM HG: CPT | Mod: CPTII,,, | Performed by: INTERNAL MEDICINE

## 2018-09-04 PROCEDURE — 1101F PT FALLS ASSESS-DOCD LE1/YR: CPT | Mod: CPTII,,, | Performed by: INTERNAL MEDICINE

## 2018-09-04 PROCEDURE — 99213 OFFICE O/P EST LOW 20 MIN: CPT | Mod: PBBFAC | Performed by: INTERNAL MEDICINE

## 2018-09-04 NOTE — PROGRESS NOTES
Subjective:   Patient ID: Viktor Townsend Sr. is a 85 y.o. Black or  male who presents for new evaluation of Chronic Kidney Disease    HPI   was seen in clinic today for evaluation of CKD. He was referred by his PCP, Dr. Jones. Since this was hist first visit, I reviewed his prior pertinent chart.     He reports being aware of having hypertension for several years, more than 20. He admits to use of various NSAID for pain control as he has DJD. He has hyperlipidemia, gout, h/o prostate cancer, diverticulosis and other medical problems.    Serial labs reviewed and discussed with him. He has onset of CKD III over last few years. A few outside lab records (from [facility]) show his creatinine was in the range of 2 in 2017 and earlier. He denies any family h/o kidney disease. He does not have diabetes.    He denies any recent acute illness. He denies decreased urine/ cloudy urine/ dysuria/ leg swelling/ flank pain/ SOB/ CP. He has nocturia. He has been on lisinopril-HCTZ based regimen and does take dose in the morning. He admits he does not drink enough fluids and admits to fairly high amount of salt intake. He does not check BP at home. Prior office visits noted for sub optimally controlled BP when he especially established care with [facility].    US kidneys from 2/17 obtained from his PCP office reviewed, 9 and 10.1 cm kidney size, cortical thinning, no hydronephrosis, no mass seen. There is no prior urinalysis. He has slow progression of CKD to IIIB.    Renal Function:  Lab Results   Component Value Date    GLU 94 07/17/2018    GLU 99 12/12/2017     07/17/2018     12/12/2017    K 4.6 07/17/2018    K 5.2 (H) 12/12/2017     07/17/2018     12/12/2017    CO2 23 07/17/2018    CO2 25 12/12/2017    BUN 43 (H) 07/17/2018    BUN 28 (H) 12/12/2017    CALCIUM 9.2 07/17/2018    CALCIUM 9.6 12/12/2017    CREATININE 2.1 (H) 07/17/2018    CREATININE 1.8 (H) 12/12/2017    ALBUMIN  3.7 07/17/2018    ALBUMIN 3.7 02/06/2017    ESTGFRAFRICA 32 (A) 07/17/2018    ESTGFRAFRICA 39 (A) 12/12/2017    EGFRNONAA 28 (A) 07/17/2018    EGFRNONAA 34 (A) 12/12/2017       Urinalysis:  No results found for: APPEARANCEUA, PHUR, SPECGRAV, PROTEINUA, GLUCUA, OCCULTUA, NITRITE, LEUKOCYTESUR    Protein/Creatinine Ratio:  No results found for: PROTEINURINE, CREATRANDUR, UTPCR    CBC:  Lab Results   Component Value Date    WBC 10.26 07/17/2018    HGB 11.4 (L) 07/17/2018    HCT 36.2 (L) 07/17/2018       PTH:  No results found for: PTH    Review of Systems   Constitutional: Negative for activity change, appetite change, chills, fatigue and fever.   HENT: Positive for sore throat. Negative for hearing loss.    Eyes: Negative for pain and discharge.   Respiratory: Negative for cough, shortness of breath and wheezing.    Cardiovascular: Negative for chest pain and leg swelling.   Gastrointestinal: Negative for abdominal pain, diarrhea, nausea and vomiting.   Endocrine: Negative for polydipsia and polyuria.   Genitourinary: Negative for dysuria, flank pain, frequency and hematuria.   Musculoskeletal: Positive for arthralgias and back pain. Negative for myalgias.   Skin: Negative for pallor and rash.   Allergic/Immunologic: Negative for environmental allergies.   Neurological: Negative for dizziness, light-headedness and headaches.   Psychiatric/Behavioral: Negative for behavioral problems. The patient is not nervous/anxious.        Objective:   Physical Exam   Constitutional: He is oriented to person, place, and time. He appears well-developed and well-nourished. No distress.   HENT:   Head: Normocephalic and atraumatic.   Mouth/Throat: Oropharynx is clear and moist.   Eyes: Conjunctivae are normal. Right eye exhibits no discharge. Left eye exhibits no discharge. No scleral icterus.   Neck: Neck supple. No thyromegaly present.   Cardiovascular: Normal rate, regular rhythm and normal heart sounds.   Pulmonary/Chest: Effort  normal and breath sounds normal. No respiratory distress. He has no wheezes. He has no rales.   Abdominal: Soft. There is no tenderness.   Abdominal obesity, no CVA tenderness, no mass felt   Genitourinary:   Genitourinary Comments: Deferred    Musculoskeletal: He exhibits no edema.   Right should movements restricted    Neurological: He is alert and oriented to person, place, and time.   Speech and cognition normal, no facial asymmetry    Skin: Skin is warm and dry. No rash noted. He is not diaphoretic. No erythema.   Psychiatric: He has a normal mood and affect. His behavior is normal.   Vitals reviewed.      Assessment:     1. CKD (chronic kidney disease) stage 3, GFR 30-59 ml/min    2. Hypertensive kidney disease with stage 3 chronic kidney disease    3. Personal history of prostate cancer        Plan:       Problem List Items Addressed This Visit        Renal/    CKD (chronic kidney disease) stage 3, GFR 30-59 ml/min - Primary    Relevant Orders    CBC auto differential    Hepatitis B surface antigen    Hepatitis C antibody    Immunofixation electrophoresis    Immunoglobulin free LT chains blood    Protein electrophoresis, serum    PTH, intact    Renal function panel    Vitamin D    Urinalysis    Protein / creatinine ratio, urine    Hypertensive kidney disease with stage 3 chronic kidney disease       Oncology    Personal history of prostate cancer        Mr. Townsend has CKD IIIB due to longstanding hypertension, heavy use of NSAID over number of years. I had a detailed discussion with patient about his CKD diagnosis, CKD staging, interpretation of serial labs pertaining to his kidneys, potential risk of progression of CKD. Possible etiology of his CKD, strict low salt diet, avoiding any NSAID, having periodic monitoring of renal labs to assess and treat any electrolyte disturbance, acid base disorder, to follow progress of CKD with creatinine and eGFR. I stressed to have BP monitoring at home and to bring  readings for review with his future visits with MD. Also d/w him need for changed to his medications as CKD progresses and as detailed below. kidney US from 2017 shows changes related to CKD and somewhat smaller kidney size.    CKD is slowly worsening  - periodically monitor renal panel for electrolytes, acid base status, eGFR  - CKD staging, diagnosis, onset of CKD, potential risk of CKD progression, potential risk of KATELYN due to volume depletion/ SILVESTRE/ ATN due to hemodynamic changes d/w patient  - stress to follow low salt diet, keep well hydrated, follow low K diet in case of any dyskalemia, nutritional changes d/w patient   - trend PTH levels, vit D, phos, corrected Ca and treat as necessary   - trend urine studies for proteinuria  - obtain screening labs for hep C/B, labs to rule out paraproteinemia   - avoid NSAID/ bactrim/ IV contrast/ gadolinium/ aminoglycoside/ fleet enema/ PPI where possible  - continue lisinopril containing regimen for RAAS blockade with close monitoring of potassium levels. Pt encouraged to monitor BP at home, goal BP level d/w patient  - trend H/H periodically if CKD stage progresses, current level of CKD by itself unlikely to explain his chronic anemia, further work up deferred to PCP.  - pt needs to follow with urologist given h/o prostate cancer     Plan, labs, recommendations were discussed with patient, his questions were answered to his satisfaction.   Total time spent was 45 minutes with >50% time spent in face to face evaluation, counseling about possible etiology, work up, monitoring, natural course of illness, recommendations.   RTC 2 months to discuss labs

## 2018-09-04 NOTE — LETTER
September 4, 2018      Kalia Jones MD  [address]  [address]  [facility] [address]           [facility]  [address]  [facility] [address]  Phone: [phone]  Fax: [phone]          Patient: Viktor Townsend Sr.   MR Number: [id number]   YOB: 1933   Date of Visit: 9/4/2018       Dear Dr. Kalia Jones:    Thank you for referring Viktor Townsend to me for evaluation. Attached you will find relevant portions of my assessment and plan of care.    If you have questions, please do not hesitate to call me. I look forward to following Viktor Townsend along with you.    Sincerely,    Ashley Castillo MD    Enclosure  CC:  No Recipients    If you would like to receive this communication electronically, please contact [email or web address] or [phone] to request more information on [facility] Link access.    For providers and/or their staff who would like to refer a patient to [facility], please contact us through our one-stop-shop provider referral line, [facility], at [phone].    If you feel you have received this communication in error or would no longer like to receive these types of communications, please e-mail [email or web address]

## 2018-09-19 ENCOUNTER — LAB VISIT (OUTPATIENT)
Dept: LAB | Facility: OTHER | Age: 83
End: 2018-09-19
Payer: MEDICARE

## 2018-09-19 DIAGNOSIS — N18.30 CKD (CHRONIC KIDNEY DISEASE) STAGE 3, GFR 30-59 ML/MIN: ICD-10-CM

## 2018-09-19 LAB
25(OH)D3+25(OH)D2 SERPL-MCNC: 26 NG/ML
ALBUMIN SERPL BCP-MCNC: 3.6 G/DL
ANION GAP SERPL CALC-SCNC: 9 MMOL/L
BASOPHILS # BLD AUTO: 0.05 K/UL
BASOPHILS NFR BLD: 0.6 %
BUN SERPL-MCNC: 31 MG/DL
CALCIUM SERPL-MCNC: 9.5 MG/DL
CHLORIDE SERPL-SCNC: 108 MMOL/L
CO2 SERPL-SCNC: 23 MMOL/L
CREAT SERPL-MCNC: 2.2 MG/DL
DIFFERENTIAL METHOD: ABNORMAL
EOSINOPHIL # BLD AUTO: 0.1 K/UL
EOSINOPHIL NFR BLD: 1.5 %
ERYTHROCYTE [DISTWIDTH] IN BLOOD BY AUTOMATED COUNT: 15 %
EST. GFR  (AFRICAN AMERICAN): 30 ML/MIN/1.73 M^2
EST. GFR  (NON AFRICAN AMERICAN): 26 ML/MIN/1.73 M^2
GLUCOSE SERPL-MCNC: 101 MG/DL
HCT VFR BLD AUTO: 37.3 %
HGB BLD-MCNC: 11.7 G/DL
LYMPHOCYTES # BLD AUTO: 2.3 K/UL
LYMPHOCYTES NFR BLD: 29 %
MCH RBC QN AUTO: 28.4 PG
MCHC RBC AUTO-ENTMCNC: 31.4 G/DL
MCV RBC AUTO: 91 FL
MONOCYTES # BLD AUTO: 0.8 K/UL
MONOCYTES NFR BLD: 10.5 %
NEUTROPHILS # BLD AUTO: 4.6 K/UL
NEUTROPHILS NFR BLD: 57.9 %
PHOSPHATE SERPL-MCNC: 3.3 MG/DL
PLATELET # BLD AUTO: 192 K/UL
PMV BLD AUTO: 10.2 FL
POTASSIUM SERPL-SCNC: 4.3 MMOL/L
PTH-INTACT SERPL-MCNC: 73.9 PG/ML
RBC # BLD AUTO: 4.12 M/UL
SODIUM SERPL-SCNC: 140 MMOL/L
WBC # BLD AUTO: 7.97 K/UL

## 2018-09-19 PROCEDURE — 83520 IMMUNOASSAY QUANT NOS NONAB: CPT | Mod: 59

## 2018-09-19 PROCEDURE — 87340 HEPATITIS B SURFACE AG IA: CPT

## 2018-09-19 PROCEDURE — 80069 RENAL FUNCTION PANEL: CPT

## 2018-09-19 PROCEDURE — 85025 COMPLETE CBC W/AUTO DIFF WBC: CPT

## 2018-09-19 PROCEDURE — 82306 VITAMIN D 25 HYDROXY: CPT

## 2018-09-19 PROCEDURE — 86803 HEPATITIS C AB TEST: CPT

## 2018-09-19 PROCEDURE — 84165 PROTEIN E-PHORESIS SERUM: CPT

## 2018-09-19 PROCEDURE — 86334 IMMUNOFIX E-PHORESIS SERUM: CPT

## 2018-09-19 PROCEDURE — 83970 ASSAY OF PARATHORMONE: CPT

## 2018-09-19 PROCEDURE — 36415 COLL VENOUS BLD VENIPUNCTURE: CPT

## 2018-09-19 PROCEDURE — 86334 IMMUNOFIX E-PHORESIS SERUM: CPT | Mod: 26,,, | Performed by: PATHOLOGY

## 2018-09-19 PROCEDURE — 84165 PROTEIN E-PHORESIS SERUM: CPT | Mod: 26,,, | Performed by: PATHOLOGY

## 2018-09-20 LAB
ALBUMIN SERPL ELPH-MCNC: 3.77 G/DL
ALPHA1 GLOB SERPL ELPH-MCNC: 0.26 G/DL
ALPHA2 GLOB SERPL ELPH-MCNC: 0.79 G/DL
B-GLOBULIN SERPL ELPH-MCNC: 0.64 G/DL
GAMMA GLOB SERPL ELPH-MCNC: 0.95 G/DL
HBV SURFACE AG SERPL QL IA: NEGATIVE
HCV AB SERPL QL IA: NEGATIVE
INTERPRETATION SERPL IFE-IMP: NORMAL
KAPPA LC SER QL IA: 6.15 MG/DL
KAPPA LC/LAMBDA SER IA: 4.59
LAMBDA LC SER QL IA: 1.34 MG/DL
PATHOLOGIST INTERPRETATION IFE: NORMAL
PATHOLOGIST INTERPRETATION SPE: NORMAL
PROT SERPL-MCNC: 6.4 G/DL

## 2018-09-21 ENCOUNTER — TELEPHONE (OUTPATIENT)
Dept: NEPHROLOGY | Facility: CLINIC | Age: 83
End: 2018-09-21

## 2018-09-21 DIAGNOSIS — D89.2 PARAPROTEINEMIA: ICD-10-CM

## 2018-09-21 NOTE — TELEPHONE ENCOUNTER
Labs reviewed. There is an abnormal protein detected on protein electrophoresis for which I advise patient to see hematologist for further input. As such kidney's filtration ability is not significantly changed since July labs.

## 2018-09-26 ENCOUNTER — TELEPHONE (OUTPATIENT)
Dept: HEMATOLOGY/ONCOLOGY | Facility: CLINIC | Age: 83
End: 2018-09-26

## 2018-09-26 ENCOUNTER — INITIAL CONSULT (OUTPATIENT)
Dept: HEMATOLOGY/ONCOLOGY | Facility: CLINIC | Age: 83
End: 2018-09-26
Payer: MEDICARE

## 2018-09-26 VITALS
HEART RATE: 76 BPM | WEIGHT: 184.5 LBS | SYSTOLIC BLOOD PRESSURE: 122 MMHG | DIASTOLIC BLOOD PRESSURE: 68 MMHG | BODY MASS INDEX: 28.96 KG/M2 | HEIGHT: 67 IN

## 2018-09-26 DIAGNOSIS — I10 ESSENTIAL HYPERTENSION: ICD-10-CM

## 2018-09-26 DIAGNOSIS — E53.8 B12 DEFICIENCY: ICD-10-CM

## 2018-09-26 DIAGNOSIS — D47.2 MGUS (MONOCLONAL GAMMOPATHY OF UNKNOWN SIGNIFICANCE): Primary | ICD-10-CM

## 2018-09-26 DIAGNOSIS — I12.9 HYPERTENSIVE KIDNEY DISEASE WITH STAGE 3 CHRONIC KIDNEY DISEASE: ICD-10-CM

## 2018-09-26 DIAGNOSIS — N18.30 HYPERTENSIVE KIDNEY DISEASE WITH STAGE 3 CHRONIC KIDNEY DISEASE: ICD-10-CM

## 2018-09-26 DIAGNOSIS — D64.9 ANEMIA, UNSPECIFIED TYPE: ICD-10-CM

## 2018-09-26 PROCEDURE — 84166 PROTEIN E-PHORESIS/URINE/CSF: CPT

## 2018-09-26 PROCEDURE — 86335 IMMUNFIX E-PHORSIS/URINE/CSF: CPT | Mod: 26,,, | Performed by: PATHOLOGY

## 2018-09-26 PROCEDURE — 3074F SYST BP LT 130 MM HG: CPT | Mod: CPTII,,, | Performed by: INTERNAL MEDICINE

## 2018-09-26 PROCEDURE — 99499 UNLISTED E&M SERVICE: CPT | Mod: S$GLB,,, | Performed by: INTERNAL MEDICINE

## 2018-09-26 PROCEDURE — 84156 ASSAY OF PROTEIN URINE: CPT

## 2018-09-26 PROCEDURE — 3078F DIAST BP <80 MM HG: CPT | Mod: CPTII,,, | Performed by: INTERNAL MEDICINE

## 2018-09-26 PROCEDURE — 99213 OFFICE O/P EST LOW 20 MIN: CPT | Mod: PBBFAC | Performed by: INTERNAL MEDICINE

## 2018-09-26 PROCEDURE — 1101F PT FALLS ASSESS-DOCD LE1/YR: CPT | Mod: CPTII,,, | Performed by: INTERNAL MEDICINE

## 2018-09-26 PROCEDURE — 99204 OFFICE O/P NEW MOD 45 MIN: CPT | Mod: S$PBB,,, | Performed by: INTERNAL MEDICINE

## 2018-09-26 PROCEDURE — 86335 IMMUNFIX E-PHORSIS/URINE/CSF: CPT

## 2018-09-26 PROCEDURE — 99999 PR PBB SHADOW E&M-EST. PATIENT-LVL III: CPT | Mod: PBBFAC,,, | Performed by: INTERNAL MEDICINE

## 2018-09-26 PROCEDURE — 84166 PROTEIN E-PHORESIS/URINE/CSF: CPT | Mod: 26,,, | Performed by: PATHOLOGY

## 2018-09-26 NOTE — TELEPHONE ENCOUNTER
Call to pt.   Pt stated did not receive Dr. Carpio's message earlier.   Informed pt that B12 level is low and per Dr. Carpio needs to take B12 1000mcg daily PO forever and will recheck labs in 4 months.   Appt scheduled.   Pt verbalized understanding and expressed no further questions.     MD Balaji Rocha Jr., Jr Staff 30 minutes ago (12:31 PM)      Please call latter to be sure he got my message--see above   B12 level and SPEP in 4 months. (Routing comment)

## 2018-09-26 NOTE — LETTER
September 26, 2018      Ashley Castillo MD  [address]  [facility] [address]           [facility]  [address]  [facility] [address]  Phone: [phone]          Patient: Viktor Townsend Sr.   MR Number: [id number]   YOB: 1933   Date of Visit: 9/26/2018       Dear Dr. Ashley Castillo:    Thank you for referring Viktor Townsend to me for evaluation. Attached you will find relevant portions of my assessment and plan of care.    If you have questions, please do not hesitate to call me. I look forward to following Viktor Townsend along with you.    Sincerely,    Charlie Carpio Jr., MD    Enclosure  CC:  No Recipients    If you would like to receive this communication electronically, please contact [email or web address] or [phone] to request more information on [employer] access.    For providers and/or their staff who would like to refer a patient to [facility], please contact us through our one-stop-shop provider referral line, [facility], at [phone].    If you feel you have received this communication in error or would no longer like to receive these types of communications, please e-mail [email or web address]

## 2018-09-26 NOTE — TELEPHONE ENCOUNTER
Left message.  B12 level low.  Advised taking B12 1000 mcg daily by mouth forever.    Will repeat B12 level and SPEP in 4 months.

## 2018-09-26 NOTE — PROGRESS NOTES
HISTORY OF PRESENT ILLNESS:  Mr. Townsend is an 85-year-old man who is seen in   consultation from Dr. Ashley Castillo.  He is here primarily in regard to a   paraprotein.    He has had hypertension for over 20 years.  He has also frequently used   nonsteroidal anti-inflammatory drugs for arthritic pain.  The laboratory studies   at [facility] since February 2016 indicated renal impairment with creatinine   values in the range of 1.7-2.2.  He recently had a serum protein   electrophoresis, which revealed a paraprotein measuring 0.31 g/dL and identified   as IgM kappa.  The free light chain analysis have a modest elevation of kappa   light chains at 6.15 and an increased kappa/lambda ratio of 4.59.    He generally feels well except for pain in his shoulder.  He fell and injured   his right shoulder many months ago.  He has been seen in the Department of   Orthopedics and has had two injections into the right shoulder region.  X-rays   disclosed no fractures.  However, he still has very limited range of motion in   his right shoulder and continued pain.  I have recommended that he telephone the   Orthopedic Department appointment desk and ask for another appointment because   of his persisting symptoms.    He does not report systemic symptoms of fevers, sweats or weight loss.  He does   not describe a history or problems suggestive of amyloidosis.  He has no   swelling of his tongue or symptoms of peripheral neuropathy.  He has no   history of cardiac disease.  He has no pulmonary symptoms, but he tells me that   imaging studies have shown signs of asbestosis in the past.    About 15 years ago, he had a course of radiation for carcinoma of the prostate   and he has regular examinations with his urologist.  He also had a transurethral   resection of his prostate.  He has long had hypertension and he has had a   cholecystectomy.  He recalls no other operations.    He has not smoked for over 30 years.  He drank alcohol  heavily (about fifth a   day), but greatly decreased drinking about five years ago.  He still drinks an   occasional beer, but he no longer drinks heavily.  He is .  He is retired   from work at [employer].    No family members had myeloma or amyloidosis.  He is not aware of any family   members who have had malignancies.  No family members have hematologic disorders   and he knows of no relatives who have sickle cell disease.    ADDITIONAL PAST HISTORY, SYSTEM REVIEW, SOCIAL HISTORY AND FAMILY HISTORY:  Have   been reviewed and updated in the electronic record.    PHYSICAL EXAMINATION:  GENERAL APPEARANCE:  A well-developed, well-nourished -American man in no   distress.  EYES:  No jaundice or pallor.  EARS:  Clear canals and membranes.  NOSE:  Clear nares.  SINUSES:  No tenderness.  MOUTH AND THROAT:  Dentures.  No mucosal lesions.  NECK:  No masses or bruits.  No thyroid abnormalities.  LYMPH NODES:  No enlarged cervical, axillary or inguinal nodes.  CHEST AND LUNGS:  Normal respiratory effort.  Clear to auscultation and   percussion.  HEART:  Regular rate and rhythm without murmur or gallop.  ABDOMEN:  Soft without masses or tenderness.  No hepatosplenomegaly.  EXTREMITIES:  No edema or cyanosis.  PERIPHERAL VASCULATURE:  Good pulses in the feet and ankles.  NEUROLOGIC:  Mental status is good.  He is fully oriented.  Motor function is   normal.  SKIN:  No suspicious lesions in the areas examined.  BONES AND JOINTS:  No tenderness over the spine.  There is diminished range of   motion of the right shoulder and pain with elevation or extension of the right   shoulder joint.    ADDITIONAL LABORATORY STUDIES:  Blood counts on 09/19/2018 included hemoglobin   11.7, WBC 7970 and platelets 192,000.  A review of his blood counts shows that   his hemoglobin in December 2017 was 13.5 and the two hemoglobin measurements in   2018 were 11.4 and 11.7.    IMPRESSION:  1.  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  2.  Renal function impairment, probably related to hypertension and nonsteroidal   anti-inflammatory drugs.  3.  Anemia, which is a bit more than I would expect in someone with his degree   of renal insufficiency.    RECOMMENDATIONS:  1.  I have asked him to submit a 24-hour urine collection for protein   electrophoresis and immunofixation.  2.  To further evaluate his anemia, blood will be obtained today for the   following studies:  CBC with peripheral smear, B12 level, soluble transferrin   receptor, reticulocyte count, quantitative immunoglobulins, hemoglobin   electrophoresis, haptoglobin and ferritin.  3.  I will notify the patient if I detect any cause of anemia that requires   further attention.  4.  I talked to him about his monoclonal protein.  IgM paraproteins are very   rarely associated with myeloma or amyloidosis.  They can be seen as precursors   of lymphoma, CLL and other lymphoproliferative diseases.  He has no signs of   such diseases at this time.  5.  I do not think that he requires regular followup examinations in the   Hematology Department because of the paraprotein.  I suggest repeating the serum   protein electrophoresis at yearly intervals.  He can be referred back to our   department if there is a substantial increase in the paraprotein or if he   develops symptoms or findings suggestive of lymphoma or other associated   diseases.     ADDENDUM: B12 level is below normal. He was advised by phone to begin  taking B12, 1000mcg, daily by mouth and to take it forever. To have repeat B12   and SPEP in 4 months.      DAMION  dd: 09/26/2018 11:20:24 (CDT)  td: 09/27/2018 02:46:50 (CDT)  Doc ID   #[id number]  Job ID #[id number]    CC: Ashley Castillo M.D.

## 2018-09-26 NOTE — TELEPHONE ENCOUNTER
Smear given to MD 2:21pm 9/26.        ----- Message from Hellen Khalil sent at 9/26/2018 11:10 AM CDT -----  Charlie Carpio Jr., MD [facility] Staff    Need smear

## 2018-09-28 LAB
PROT 24H UR-MRATE: NORMAL MG/SPEC
PROT UR-MCNC: <7 MG/DL
URINE COLLECTION DURATION: 24 HR
URINE VOLUME: 2000 ML

## 2018-10-03 ENCOUNTER — TELEPHONE (OUTPATIENT)
Dept: HEMATOLOGY/ONCOLOGY | Facility: CLINIC | Age: 83
End: 2018-10-03

## 2018-10-03 LAB
INTERPRETATION UR IFE-IMP: NORMAL
PATHOLOGIST INTERPRETATION UIFE: NORMAL

## 2018-10-04 ENCOUNTER — TELEPHONE (OUTPATIENT)
Dept: HEMATOLOGY/ONCOLOGY | Facility: CLINIC | Age: 83
End: 2018-10-04

## 2018-10-04 LAB
PATHOLOGIST INTERPRETATION UPE: NORMAL
PROT PATTERN UR ELPH-IMP: NORMAL

## 2018-10-04 NOTE — TELEPHONE ENCOUNTER
I left a message on his wife's phone about negative urine protein result and importance of his taking vitamin B 12, 1000mcg daily forever.    I had tried their home phone 3 times in last 2 days and it was always busy.

## 2018-10-19 RX ORDER — ALLOPURINOL 300 MG/1
TABLET ORAL
Qty: 30 TABLET | Refills: 0 | Status: SHIPPED | OUTPATIENT
Start: 2018-10-19 | End: 2018-10-19 | Stop reason: SDUPTHER

## 2018-10-19 RX ORDER — ALLOPURINOL 300 MG/1
TABLET ORAL
Qty: 90 TABLET | Refills: 0 | Status: SHIPPED | OUTPATIENT
Start: 2018-10-19 | End: 2019-01-10 | Stop reason: SDUPTHER

## 2018-11-05 ENCOUNTER — TELEPHONE (OUTPATIENT)
Dept: INTERNAL MEDICINE | Facility: CLINIC | Age: 83
End: 2018-11-05

## 2018-11-05 NOTE — TELEPHONE ENCOUNTER
----- Message from Cesar Murphy sent at 11/5/2018  9:46 AM CST -----  Contact: ASHLEY EPSTEIN [[id number]] (spouse)  Name of Who is Calling: ASHLEY EPSTEIN [[id number]]      What is the request in detail: would like to speak with staff to schedule the flu shot for thursday      Can the clinic reply by [facility]: no      What Number to Call Back if not in [facility]: [phone]

## 2018-11-08 ENCOUNTER — CLINICAL SUPPORT (OUTPATIENT)
Dept: INTERNAL MEDICINE | Facility: CLINIC | Age: 83
End: 2018-11-08
Payer: MEDICARE

## 2018-11-08 ENCOUNTER — OFFICE VISIT (OUTPATIENT)
Dept: ORTHOPEDICS | Facility: CLINIC | Age: 83
End: 2018-11-08
Attending: FAMILY MEDICINE
Payer: MEDICARE

## 2018-11-08 VITALS
WEIGHT: 184 LBS | SYSTOLIC BLOOD PRESSURE: 124 MMHG | HEIGHT: 67 IN | BODY MASS INDEX: 28.88 KG/M2 | HEART RATE: 67 BPM | DIASTOLIC BLOOD PRESSURE: 75 MMHG

## 2018-11-08 DIAGNOSIS — G89.29 CHRONIC RIGHT SHOULDER PAIN: Primary | ICD-10-CM

## 2018-11-08 DIAGNOSIS — M25.511 CHRONIC RIGHT SHOULDER PAIN: Primary | ICD-10-CM

## 2018-11-08 PROCEDURE — 90662 IIV NO PRSV INCREASED AG IM: CPT | Mod: S$GLB,,, | Performed by: FAMILY MEDICINE

## 2018-11-08 PROCEDURE — 99213 OFFICE O/P EST LOW 20 MIN: CPT | Mod: S$GLB,,, | Performed by: PHYSICIAN ASSISTANT

## 2018-11-08 PROCEDURE — G0008 ADMIN INFLUENZA VIRUS VAC: HCPCS | Mod: S$GLB,,, | Performed by: FAMILY MEDICINE

## 2018-11-08 PROCEDURE — 99999 PR PBB SHADOW E&M-EST. PATIENT-LVL III: CPT | Mod: PBBFAC,,, | Performed by: PHYSICIAN ASSISTANT

## 2018-11-08 PROCEDURE — 3074F SYST BP LT 130 MM HG: CPT | Mod: CPTII,S$GLB,, | Performed by: PHYSICIAN ASSISTANT

## 2018-11-08 PROCEDURE — 1101F PT FALLS ASSESS-DOCD LE1/YR: CPT | Mod: CPTII,S$GLB,, | Performed by: PHYSICIAN ASSISTANT

## 2018-11-08 PROCEDURE — 3078F DIAST BP <80 MM HG: CPT | Mod: CPTII,S$GLB,, | Performed by: PHYSICIAN ASSISTANT

## 2018-11-08 NOTE — PROGRESS NOTES
"Subjective:      Patient ID: Viktor Townsend Sr. is a 85 y.o. male.    Chief Complaint: Pain of the Right Shoulder      HPI  Viktor Townsend Sr. is a right hand dominant 85 y.o. male presenting today for follow up of right shoulder pain. He underwent right shoulder steroid injection at his last visit in 8/2018, he reports that it did not help.  He is performing his HEP three times/day.  He previously attended PT.  Voltaren gel previously denied by insurance.  Pain is reported as 2/10.  Pain is increased with motion, he also reports that he has difficulty keeping his arm raised.  He reports "clicking with shoulder motion."    He also underwent steroid injection at his visit in 3/2018, that did provide some improvement.  He has gout, recent flare in his right hand that improved with colchicine and allopurinol.  There was a history of trauma to the right shoulder.  Onset of right shoulder symptoms began late December 2017.  He was picking up some groceries, tripped, and fell onto the right shoulder.  He still has some discomfort with sleeping on the right side. He does take occasional Tylenol, which provides some relief.         Review of patient's allergies indicates:   Allergen Reactions    Oranges [orange] Shortness Of Breath         Current Outpatient Medications   Medication Sig Dispense Refill    acetaminophen (TYLENOL) 325 MG tablet Take 325 mg by mouth every 6 (six) hours as needed for Pain.      allopurinol (ZYLOPRIM) 300 MG tablet TAKE 1 TABLET(300 MG) BY MOUTH EVERY DAY 90 tablet 0    calcium carbonate (TUMS) 200 mg calcium (500 mg) chewable tablet Take 1 tablet by mouth 3 (three) times daily as needed for Heartburn.      lisinopril-hydrochlorothiazide (PRINZIDE,ZESTORETIC) 20-25 mg Tab Take 1 tablet by mouth once daily. 90 tablet 3    pravastatin (PRAVACHOL) 20 MG tablet Take 1 tablet (20 mg total) by mouth once daily. 90 tablet 3     No current facility-administered medications for this visit.  " "      Past Medical History:   Diagnosis Date    CKD (chronic kidney disease)     Colon polyps     Gout     Hiatal hernia     Hypertension     Lumbar back pain     Personal history of asbestosis        Past Surgical History:   Procedure Laterality Date    CHOLECYSTECTOMY      EYE SURGERY      PROSTATE SURGERY      REPAIR-HERNIA-INGUINAL-INITIAL (5 YRS+) Left 12/14/2017    Performed by Avery Melton Jr., MD at [facility] OR         Review of Systems:  Review of Systems   Constitution: Negative for chills and fever.   Skin: Negative for rash and suspicious lesions.   Musculoskeletal:        See HPI   Neurological: Negative for dizziness, headaches, light-headedness, numbness and paresthesias.   Psychiatric/Behavioral: Negative for depression. The patient is not nervous/anxious.          OBJECTIVE:     PHYSICAL EXAM:  Height: 5' 7" (170.2 cm) Weight: 83.5 kg (184 lb)     Vitals:    11/08/18 1406   BP: 124/75   Pulse: 67     General    Vitals reviewed.  Constitutional: He is oriented to person, place, and time. He appears well-developed and well-nourished.   HENT:   Head: Normocephalic and atraumatic.   Neck: Normal range of motion.   Cardiovascular: Normal rate.    Pulmonary/Chest: Effort normal. No respiratory distress.   Neurological: He is alert and oriented to person, place, and time.   Psychiatric: He has a normal mood and affect. His behavior is normal. Judgment and thought content normal.             Musculoskeletal:  Nontender to palpation today.  No scars abrasions or lacerations.  Good elbow, wrist, and finger range of motion.  Active right shoulder range of motion is decreased, forward flexion to approximately 100°, abduction to approximately 110°, decreased ER and IR.  Passive range of motion of the right shoulder better than active, with good FF and abduction. Crepitus noted with motion.  Right shoulder weakness noted.  Neurovascularly intact-good sensation and motor function, 2+ radial " pulses.    RADIOGRAPHS:  Right Shoulder X-Ray, 1/25/2018  AP, scapular Y, and axillary radiographs of the right shoulder were obtained.  The bones are intact.  There is no evidence for acute fracture or bone destruction.  There is no evidence for dislocation.  There are mild degenerative changes of the acromioclavicular joint identified.  Soft tissues are unremarkable.   Impression   No evidence for acute fracture, bone destruction, or dislocation.  Mild degenerative changes.     Comments: I have personally reviewed the imaging and I agree with the above radiologist's report.    ASSESSMENT/PLAN:   Viktor was seen today for pain.    Diagnoses and all orders for this visit:    Chronic right shoulder pain  -     Ambulatory Referral to Physical/Occupational Therapy           - We talked at length about the anatomy and pathophysiology of   Encounter Diagnosis   Name Primary?    Chronic right shoulder pain Yes       - Discussed conservative treatment options for right shoulder pain and decreased range of motion. Discussed possibility of surgery, he is not interested in surgical treatment.   - Restart PT  - Continue HEP   - Tylenol for pain  - Compound cream sent to pharmacy  - Follow up if not improved  - Call with questions or concerns

## 2018-11-08 NOTE — PROGRESS NOTES
"Patient was given vaccine information sheet for the Flu Vaccine. The area of injection was palpated using the acromion process as a landmark. This area was cleaned with alcohol. Using a 25g 1" safety needle, 0.5mL of the vaccine was placed into the left deltoid muscle. The injection site was dressed with a bandage. Patient experienced no complications and was discharged in stable condition. Fluzone High Dose vaccine Lot: [id number] Exp: [id number]    "

## 2018-11-08 NOTE — LETTER
November 12, 2018      Kalia Jones MD  [address]  [address]  [facility] [address]           [facility]  [address]  [facility] [address]  Phone: [phone]          Patient: Viktor Townsend Sr.   MR Number: [id number]   YOB: 1933   Date of Visit: 11/8/2018       Dear Dr. Kalia Jones:    Thank you for referring Viktor Townsend to me for evaluation. Attached you will find relevant portions of my assessment and plan of care.    If you have questions, please do not hesitate to call me. I look forward to following Viktor Townsend along with you.    Sincerely,    MARV John    Enclosure  CC:  No Recipients    If you would like to receive this communication electronically, please contact [email or web address] or [phone] to request more information on [employer] access.    For providers and/or their staff who would like to refer a patient to [facility], please contact us through our one-stop-shop provider referral line, [facility], at [phone].    If you feel you have received this communication in error or would no longer like to receive these types of communications, please e-mail [email or web address]

## 2018-11-20 ENCOUNTER — TELEPHONE (OUTPATIENT)
Dept: NEPHROLOGY | Facility: CLINIC | Age: 83
End: 2018-11-20

## 2018-11-20 NOTE — TELEPHONE ENCOUNTER
----- Message from Marcos West sent at 11/20/2018  9:58 AM CST -----  Contact: Dominic- Jayla  Will like a call from staff concerning 9/19 urine specimen results     Contact::[phone]

## 2018-11-20 NOTE — TELEPHONE ENCOUNTER
----- Message from Desi Weber sent at 11/20/2018  9:34 AM CST -----  Contact: Lou Karimi / Wife [phone]  PT has not received results from specimen lab on 9/29. He can be reached at [phone].

## 2018-12-04 ENCOUNTER — CLINICAL SUPPORT (OUTPATIENT)
Dept: REHABILITATION | Facility: HOSPITAL | Age: 83
End: 2018-12-04
Payer: MEDICARE

## 2018-12-04 DIAGNOSIS — M25.511 RIGHT SHOULDER PAIN, UNSPECIFIED CHRONICITY: Primary | ICD-10-CM

## 2018-12-04 PROCEDURE — 97110 THERAPEUTIC EXERCISES: CPT | Mod: PO

## 2018-12-04 PROCEDURE — G8984 CARRY CURRENT STATUS: HCPCS | Mod: CL,PO

## 2018-12-04 PROCEDURE — 97161 PT EVAL LOW COMPLEX 20 MIN: CPT | Mod: PO

## 2018-12-04 PROCEDURE — G8986 CARRY D/C STATUS: HCPCS | Mod: CK,PO

## 2018-12-04 NOTE — PROGRESS NOTES
[facility]  Physical Therapy Initial Evaluation    Name: Viktor Townsend Sr.  Clinic Number: [id number]    Therapy Diagnosis:   Encounter Diagnosis   Name Primary?    Right shoulder pain, unspecified chronicity Yes     Physician: Nessa Phelan PA    Physician Orders: PT Eval and Treat R shoulder  Medical Diagnosis:   M25.511,G89.29 (ICD-10-CM) - Chronic right shoulder pain  Evaluation Date: 12/4/2018  Authorization period Expiration: 1/28/2019  Plan of Care Certification Period: 1/15/2019    Visit #: 1/ Visits authorized: 12  Time In:  1110  Time Out: 1202  Total Billable Time: 52 minutes    Precautions: Standard  Subjective   Date of onset: January 2018  History of current condition - Viktor reports that he began having R shoulder pain beginning in January 2018 and he tripped and fell on his shoulder.  Patient received physical therapy at this clinic for ~2 months and said that it helped a little.  Patient does not think the therapy is going to help much.  Patient said that he has received 2-3 shots of cortisone over the last year but they also have only helped a little bit.  Patient denies taking any pain medication by mouth.  Patient also was prescribed a gel for the pain but it was too expensive so he did not get it.  Patient is R hand dominant.        Past Medical History:   Diagnosis Date    CKD (chronic kidney disease)     Colon polyps     Gout     Hiatal hernia     Hypertension     Lumbar back pain     Personal history of asbestosis      Viktor Townsend Sr.  has a past surgical history that includes Prostate surgery; Cholecystectomy; Eye surgery; and REPAIR-HERNIA-INGUINAL-INITIAL (5 YRS+) (Left, 12/14/2017).    Viktor has a current medication list which includes the following prescription(s): acetaminophen, allopurinol, calcium carbonate, lisinopril-hydrochlorothiazide, and pravastatin.    Review of patient's allergies indicates:  No Known Allergies     Imaging, x-ray: (+)  mild degenerative changes     Prior Therapy: PT for 2 months with minimal improvement noted by patient  Social History: Patient lives with their family and lives with their spouse in a 1 story home with 4-5 steps to get to the front door.    Occupation:  Retired  Prior Level of Function: (I) in all ADLs prior to fall in January 2018  Current Level of Function: difficulty with reaching overhead, difficulty with lifting/carrying overhead, some difficulty with grooming hair and washing hair, pain with laying on the R arm    Pain:  Current 0/10, worst 4/10, best 0/10   Location: shoulder  right  Description: Sharp and Shooting  Aggravating Factors: reaching  Easing Factors: pain medication and topical OTC pain medication    Pts goals: Patient wants to be able to reach overhead        Objective     Observation: Unremarkable    Posture: Forward head, rounded shoulders       Passive Range of Motion:   Shoulder Left Right   Flexion n/a 170 P!   Abduction n/a 126   ER at 90 n/a 54   IR n/a n/a      Active Range of Motion:   Shoulder Left Right   Flexion  p!   Abduction    ER at 90 WFL 50   IR WFL WFL   Functional IR T10 T10   Functional ER C7 Ear p!     Upper Extremity Strength  (R) UE  (L) UE    Shoulder flexion: 4/5 p! Shoulder flexion: 5/5   Shoulder Abduction: 3/5 p! Shoulder abduction: 5/5   Shoulder ER 4-/5 Shoulder ER 4+/5   Shoulder IR 4/5 Shoulder IR 5/5   Shoulder extension 4+/5 Shoulder extension 5/5   Elbow flexion 4/5 Elbow flexion 5/5   Elbow extension 4/5 Elbow extension 5/5         Special Tests:  AC Joint Left Rigth   Full cane (-) (+)   Empty Can Test (-) (+)   Drop Arm test (-) (-)       Joint Mobility: Decreased posteriorly on the R     Palpation: denies any tenderness    Sensation: WFL    Scapular Control/Dyskinesis:    Normal / Subtle / Obvious  Comments    Left  Normal    Right  Obvious with abd            CMS Impairment/Limitation/Restriction for FOTO Intake Survey    Therapist reviewed  "FOTO scores for Viktor Townsend Sr. on 12/4/2018.   FOTO documents entered into [employer] - see Media section.    Limitation Score: 66%  Category: Carrying    Current : CL = least 60% but < 80% impaired, limited or restricted  Goal: CK = at least 40% but < 60% impaired, limited or restricted     PT Evaluation Completed? Yes  Discussed Plan of Care with patient: Yes    TREATMENT   Treatment Time In: 1110  Treatment Time Out: 1202  Total Treatment time separate from Evaluation time:  26    Viktor received therapeutic exercises to develop strength, endurance, ROM, flexibility and posture for 26 minutes including:    UBE 2'/2'  Doorway stretch 10 x 10"  Wall slides flexion 5" x 15  Wall slides abduction 5" x 15  Cane abduction 5" x 15      Home Exercises and Patient Education Provided    Education provided re:   - progress towards goals   - role of therapy in multi - disciplinary team, goals for therapy  No spiritual or educational barriers to learning provided    Written Home Exercises Provided: see patient instructions.  Exercises were reviewed and Viktor was able to demonstrate them prior to the end of the session.   Pt received a written copy of exercises to perform at home. Viktor demonstrated good  understanding of the education provided.       Assessment     Viktor is a 85 y.o. male referred to outpatient Physical Therapy with a medical diagnosis of chronic right shoulder pain. Pt presents with limited R shoulder pain, weakness of RUE, and abnormal scapulohumeral rhythm.  Functionally, patient has difficulty with reaching overhead, lifting/carrying overhead, and grooming/washing the hair.      Pt prognosis is Good.   Pt will benefit from skilled outpatient Physical Therapy to address the deficits stated above and in the chart below, provide pt/family education, and to maximize pt's level of independence.     Plan of care discussed with patient: Yes  Pt's spiritual, cultural and educational needs considered and pt agreeable to " plan of care and goals as stated below:     Anticipated Barriers for therapy: none    Medical Necessity is demonstrated by the following  History  Co-morbidities and personal factors that may impact the plan of care Examination  Body Structures and Functions, activity limitations and participation restrictions that may impact the plan of care    Clinical Presentation   Co-morbidities:   CKD stage unknown, HTN        Personal Factors:   no deficits Body Regions:   upper extremities    Body Systems:    gross symmetry  ROM  strength  gross coordinated movement            Participation Restrictions:   none     Activity limitations:   Learning and applying knowledge  no deficits    General Tasks and Commands  no deficits    Communication  no deficits    Mobility  lifting and carrying objects    Self care  washing oneself (bathing, drying, washing hands)  caring for body parts (brushing teeth, shaving, grooming)    Domestic Life  no deficits    Interactions/Relationships  no deficits    Life Areas  no deficits    Community and Social Life  community life  recreation and leisure         stable and uncomplicated                      low   low  low Decision Making/ Complexity Score:  low         GOALS: Short Term Goals:  3 weeks  1.  Patient will report 2/10 pain at worst with reaching.  2.  Patient will demonstrate a 50% increase in R shoulder ROM for improved tolerance for reaching.  3.  Patient will demonstrate a 1 grade increase in R shoulder strength for improved tolerance for lifting/carrying.  4. Pt to tolerate HEP to improve ROM and independence with ADL's    Long Term Goals: 6 weeks  1  Patient will report 0/10 pain at worst with reaching.  2.  Patient will demonstrate a 75% in R shoulder ROM for improved tolerance reaching.  3.  Patient will demonstrate a 2 grade increase in R shoulder strength for improved tolerance for lifting/carrying.  4. Pt to be Independent with updated HEP to improve ROM and independence  with ADL's      Plan     Certification Period: 12/4/2018 to 1/15/2019.    Outpatient Physical Therapy 2 times weekly for 6 weeks to include the following interventions: Electrical Stimulation TENS, Gait Training, Manual Therapy, Moist Heat/ Ice, Neuromuscular Re-ed, Patient Education, Therapeutic Activites and Therapeutic Exercise.     Génesis Yeboah, PT

## 2018-12-04 NOTE — PLAN OF CARE
[facility]  Physical Therapy Initial Evaluation    Name: Viktor Townsend Sr.  Clinic Number: [id number]    Therapy Diagnosis:   Encounter Diagnosis   Name Primary?    Right shoulder pain, unspecified chronicity Yes     Physician: Nessa Phelan PA    Physician Orders: PT Eval and Treat R shoulder  Medical Diagnosis:   M25.511,G89.29 (ICD-10-CM) - Chronic right shoulder pain  Evaluation Date: 12/4/2018  Authorization period Expiration: 1/28/2019  Plan of Care Certification Period: 1/15/2019    Visit #: 1/ Visits authorized: 12  Time In:  1110  Time Out: 1202  Total Billable Time: 52 minutes    Precautions: Standard  Subjective   Date of onset: January 2018  History of current condition - Viktor reports that he began having R shoulder pain beginning in January 2018 and he tripped and fell on his shoulder.  Patient received physical therapy at this clinic for ~2 months and said that it helped a little.  Patient does not think the therapy is going to help much.  Patient said that he has received 2-3 shots of cortisone over the last year but they also have only helped a little bit.  Patient denies taking any pain medication by mouth.  Patient also was prescribed a gel for the pain but it was too expensive so he did not get it.  Patient is R hand dominant.        Past Medical History:   Diagnosis Date    CKD (chronic kidney disease)     Colon polyps     Gout     Hiatal hernia     Hypertension     Lumbar back pain     Personal history of asbestosis      Viktor Townsend Sr.  has a past surgical history that includes Prostate surgery; Cholecystectomy; Eye surgery; and REPAIR-HERNIA-INGUINAL-INITIAL (5 YRS+) (Left, 12/14/2017).    Viktor has a current medication list which includes the following prescription(s): acetaminophen, allopurinol, calcium carbonate, lisinopril-hydrochlorothiazide, and pravastatin.    Review of patient's allergies indicates:  No Known Allergies     Imaging, x-ray: (+)  mild degenerative changes     Prior Therapy: PT for 2 months with minimal improvement noted by patient  Social History: Patient lives with their family and lives with their spouse in a 1 story home with 4-5 steps to get to the front door.    Occupation:  Retired  Prior Level of Function: (I) in all ADLs prior to fall in January 2018  Current Level of Function: difficulty with reaching overhead, difficulty with lifting/carrying overhead, some difficulty with grooming hair and washing hair, pain with laying on the R arm    Pain:  Current 0/10, worst 4/10, best 0/10   Location: shoulder  right  Description: Sharp and Shooting  Aggravating Factors: reaching  Easing Factors: pain medication and topical OTC pain medication    Pts goals: Patient wants to be able to reach overhead        Objective     Observation: Unremarkable    Posture: Forward head, rounded shoulders       Passive Range of Motion:   Shoulder Left Right   Flexion n/a 170 P!   Abduction n/a 126   ER at 90 n/a 54   IR n/a n/a      Active Range of Motion:   Shoulder Left Right   Flexion  p!   Abduction    ER at 90 WFL 50   IR WFL WFL   Functional IR T10 T10   Functional ER C7 Ear p!     Upper Extremity Strength  (R) UE  (L) UE    Shoulder flexion: 4/5 p! Shoulder flexion: 5/5   Shoulder Abduction: 3/5 p! Shoulder abduction: 5/5   Shoulder ER 4-/5 Shoulder ER 4+/5   Shoulder IR 4/5 Shoulder IR 5/5   Shoulder extension 4+/5 Shoulder extension 5/5   Elbow flexion 4/5 Elbow flexion 5/5   Elbow extension 4/5 Elbow extension 5/5         Special Tests:  AC Joint Left Rigth   Full cane (-) (+)   Empty Can Test (-) (+)   Drop Arm test (-) (-)       Joint Mobility: Decreased posteriorly on the R     Palpation: denies any tenderness    Sensation: WFL    Scapular Control/Dyskinesis:    Normal / Subtle / Obvious  Comments    Left  Normal    Right  Obvious with abd            CMS Impairment/Limitation/Restriction for FOTO Intake Survey    Therapist reviewed  "FOTO scores for Vitkor Townsend Sr. on 12/4/2018.   FOTO documents entered into [employer] - see Media section.    Limitation Score: 66%  Category: Carrying    Current : CL = least 60% but < 80% impaired, limited or restricted  Goal: CK = at least 40% but < 60% impaired, limited or restricted     PT Evaluation Completed? Yes  Discussed Plan of Care with patient: Yes    TREATMENT   Treatment Time In: 1110  Treatment Time Out: 1202  Total Treatment time separate from Evaluation time:  26    Viktor received therapeutic exercises to develop strength, endurance, ROM, flexibility and posture for 26 minutes including:    UBE 2'/2'  Doorway stretch 10 x 10"  Wall slides flexion 5" x 15  Wall slides abduction 5" x 15  Cane abduction 5" x 15      Home Exercises and Patient Education Provided    Education provided re:   - progress towards goals   - role of therapy in multi - disciplinary team, goals for therapy  No spiritual or educational barriers to learning provided    Written Home Exercises Provided: see patient instructions.  Exercises were reviewed and Viktor was able to demonstrate them prior to the end of the session.   Pt received a written copy of exercises to perform at home. Viktor demonstrated good  understanding of the education provided.       Assessment     Viktor is a 85 y.o. male referred to outpatient Physical Therapy with a medical diagnosis of chronic right shoulder pain. Pt presents with limited R shoulder pain, weakness of RUE, and abnormal scapulohumeral rhythm.  Functionally, patient has difficulty with reaching overhead, lifting/carrying overhead, and grooming/washing the hair.      Pt prognosis is Good.   Pt will benefit from skilled outpatient Physical Therapy to address the deficits stated above and in the chart below, provide pt/family education, and to maximize pt's level of independence.     Plan of care discussed with patient: Yes  Pt's spiritual, cultural and educational needs considered and pt agreeable to " plan of care and goals as stated below:     Anticipated Barriers for therapy: none    Medical Necessity is demonstrated by the following  History  Co-morbidities and personal factors that may impact the plan of care Examination  Body Structures and Functions, activity limitations and participation restrictions that may impact the plan of care    Clinical Presentation   Co-morbidities:   CKD stage unknown, HTN        Personal Factors:   no deficits Body Regions:   upper extremities    Body Systems:    gross symmetry  ROM  strength  gross coordinated movement            Participation Restrictions:   none     Activity limitations:   Learning and applying knowledge  no deficits    General Tasks and Commands  no deficits    Communication  no deficits    Mobility  lifting and carrying objects    Self care  washing oneself (bathing, drying, washing hands)  caring for body parts (brushing teeth, shaving, grooming)    Domestic Life  no deficits    Interactions/Relationships  no deficits    Life Areas  no deficits    Community and Social Life  community life  recreation and leisure         stable and uncomplicated                      low   low  low Decision Making/ Complexity Score:  low         GOALS: Short Term Goals:  3 weeks  1.  Patient will report 2/10 pain at worst with reaching.  2.  Patient will demonstrate a 50% increase in R shoulder ROM for improved tolerance for reaching.  3.  Patient will demonstrate a 1 grade increase in R shoulder strength for improved tolerance for lifting/carrying.  4. Pt to tolerate HEP to improve ROM and independence with ADL's    Long Term Goals: 6 weeks  1  Patient will report 0/10 pain at worst with reaching.  2.  Patient will demonstrate a 75% in R shoulder ROM for improved tolerance reaching.  3.  Patient will demonstrate a 2 grade increase in R shoulder strength for improved tolerance for lifting/carrying.  4. Pt to be Independent with updated HEP to improve ROM and independence  with ADL's      Plan     Certification Period: 12/4/2018 to 1/15/2019.    Outpatient Physical Therapy 2 times weekly for 6 weeks to include the following interventions: Electrical Stimulation TENS, Gait Training, Manual Therapy, Moist Heat/ Ice, Neuromuscular Re-ed, Patient Education, Therapeutic Activites and Therapeutic Exercise.     Génesis Yeboah, PT

## 2018-12-10 ENCOUNTER — CLINICAL SUPPORT (OUTPATIENT)
Dept: REHABILITATION | Facility: HOSPITAL | Age: 83
End: 2018-12-10
Payer: MEDICARE

## 2018-12-10 DIAGNOSIS — M25.511 RIGHT SHOULDER PAIN, UNSPECIFIED CHRONICITY: Primary | ICD-10-CM

## 2018-12-10 PROCEDURE — 97110 THERAPEUTIC EXERCISES: CPT | Mod: PO

## 2018-12-10 NOTE — PROGRESS NOTES
"  Physical Therapy Daily Treatment Note     Name: Viktor Townsend .  Clinic Number: [id number]    Therapy Diagnosis:   Encounter Diagnosis   Name Primary?    Right shoulder pain, unspecified chronicity Yes     Physician: Nessa Phelan PA    Visit Date: 12/10/2018    Physician Orders: PT Eval and Treat R shoulder  Medical Diagnosis:   M25.511,G89.29 (ICD-10-CM) - Chronic right shoulder pain  Evaluation Date: 12/4/2018  Authorization period Expiration: 1/28/2019  Plan of Care Certification Period: 1/15/2019   Visit #: 2 Visits authorized: 12  Time In 12:05  Time Out: 12:55  TreatmentTime: 50minutes  1:1 time: 25     Precautions: Standard    Subjective     Pt reports: some chronic upper back, (R) shoulder stiffness/soreness.  Pain:3- 4/10 to (R) shoulder      Objective     Viktor received therapeutic exercises to develop  strength, endurance, ROM, flexibility and posture for 45 minutes including:     UBE 2'/2' level 1  AAROM on pulleys for shoulder flex stretch x 2 minutes   Doorway pec stretch 10 x 10"  Wall walks with T-ball for shoulder flex stretch x 10 with 5 sec hold  Scap retract/rows with OTB 2 x 10  Shoulder ext with OTB 2 x 10  Standing Scap retract/Protract with T-ball on plinth  X 30 -   Standing H-abd/Add with T-ball on plinth x 30   AAROM scaption with dowel assist 2 x 10 ( cues to avoid shoulder hike)  Supine AA shoulder flex with dowel  2 x 10 with 5 sec hold  SUpine A/A shoulder abd with dowel 2 x 10  scap protract in supine with dowel 2 x 10  Sidelying shoulder  abd to 90 degrees 2 x 10  Sidelying ER 2 x 10   Patient receives PROM (R) shoulder flex/abd and ER/IR      Viktor received the following manual therapy techniques: Joint mobilizations were applied to the: (R) shoulder  for 5 minutes including:  GH oscillations,  Posterior/inferior GH glides Grade ll       Written Home Exercises Provided: Patient educated to continue with previously issued HEP to tolerance.  Exercises were reviewed and Viktor " was able to demonstrate them prior to the end of the session.  Viktor demonstrated good  understanding of the education provided.          Assessment     Patient shanique Tx fairly well. He was able to perform the above ex's/activities within tolerable muscular fatigue and discomfort. Some generalized (R) shoulder weakness/stiffness continues to be evident.  Requires cues to attempt to avoid compensatory movements with ex's along with cues for postural awareness. Pain level remained in the tolerable range post session. Will monitor and attempt to progress as tolerated.  Pt will continue to benefit from skilled outpatient physical therapy to address the deficits listed in the problem list box on initial evaluation, provide pt/family education and to maximize pt's level of independence in the home and community environment.     Pt's spiritual, cultural and educational needs considered and pt agreeable to plan of care and goals.     GOALS: Short Term Goals:  3 weeks  1.  Patient will report 2/10 pain at worst with reaching.  2.  Patient will demonstrate a 50% increase in R shoulder ROM for improved tolerance for reaching.  3.  Patient will demonstrate a 1 grade increase in R shoulder strength for improved tolerance for lifting/carrying.  4. Pt to tolerate HEP to improve ROM and independence with ADL's     Long Term Goals: 6 weeks  1  Patient will report 0/10 pain at worst with reaching.  2.  Patient will demonstrate a 75% in R shoulder ROM for improved tolerance reaching.  3.  Patient will demonstrate a 2 grade increase in R shoulder strength for improved tolerance for lifting/carrying.  4. Pt to be Independent with updated HEP to improve ROM and independence with ADL's    Plan     Continue PT towards established plan of care and goals.     Avery Leos, PTA

## 2018-12-12 ENCOUNTER — CLINICAL SUPPORT (OUTPATIENT)
Dept: REHABILITATION | Facility: HOSPITAL | Age: 83
End: 2018-12-12
Payer: MEDICARE

## 2018-12-12 DIAGNOSIS — M25.511 RIGHT SHOULDER PAIN, UNSPECIFIED CHRONICITY: Primary | ICD-10-CM

## 2018-12-12 PROCEDURE — 97140 MANUAL THERAPY 1/> REGIONS: CPT | Mod: PO

## 2018-12-12 NOTE — PROGRESS NOTES
"  Physical Therapy Daily Treatment Note     Name: Viktor Townsend .  Clinic Number: [id number]    Therapy Diagnosis:   Encounter Diagnosis   Name Primary?    Right shoulder pain, unspecified chronicity Yes     Physician: Nessa Phelan PA    Visit Date: 12/12/2018    Physician Orders: PT Eval and Treat R shoulder  Medical Diagnosis:   M25.511,G89.29 (ICD-10-CM) - Chronic right shoulder pain  Evaluation Date: 12/4/2018  Authorization period Expiration: 1/28/2019  Plan of Care Certification Period: 1/15/2019   Visit #: 3 Visits authorized: 12  Time In 0152  Time Out: 0240  TreatmentTime: 48 minutes  1:1 time: 12     Precautions: Standard    Subjective     Pt reports: no pain in his shoulder today.  Pain:  0/10 to (R) shoulder      Objective     Viktor received therapeutic exercises to develop  strength, endurance, ROM, flexibility and posture for 36 minutes including:     UBE 3'/3' level 1  AAROM on pulleys for shoulder flex stretch x 2 minutes   Doorway pec stretch 10 x 10"  Wall walks with T-ball for shoulder flex stretch x 10 with 5 sec hold  Scap retract/rows with OTB 2 x 10  Shoulder ext with OTB 2 x 10  Standing Scap retract/Protract with T-ball on plinth  X 30 -   Standing H-abd/Add with T-ball on plinth x 30   AAROM scaption with dowel assist 2 x 10 ( cues to avoid shoulder hike)  Supine AA shoulder flex with dowel  2 x 10 with 5 sec hold  SUpine A/A shoulder abd with dowel 2 x 10  scap protract in supine with dowel 2 x 10 (cues for correct form)  Sidelying shoulder  abd to 90 degrees 2 x 10  Sidelying ER 2 x 10   Patient receives PROM (R) shoulder flex/abd and ER/IR-NP     Viktor received the following manual therapy techniques: Joint mobilizations were applied to the: (R) shoulder  for 12 minutes including:  GH oscillations,  Posterior/inferior GH glides Grade ll  PROM to R shoulder        Written Home Exercises Provided: Patient educated to continue with previously issued HEP to tolerance.  Exercises were " reviewed and Viktor was able to demonstrate them prior to the end of the session.  Viktor demonstrated good  understanding of the education provided.       Assessment     Patient denied any pain during tx session today.  Verbal and tactile cueing for correct form with scap retraction in supine as well as with ROM activities done in standing with physioball.  Minimal joint restriction noted during manual techniques.  Patient demonstrates improved overall mobility both active assistively and passively but continues to have pain at end range with flexion and abduction.  Will monitor and attempt to progress as tolerated.  Pt will continue to benefit from skilled outpatient physical therapy to address the deficits listed in the problem list box on initial evaluation, provide pt/family education and to maximize pt's level of independence in the home and community environment.     Pt's spiritual, cultural and educational needs considered and pt agreeable to plan of care and goals.     GOALS: Short Term Goals:  3 weeks  1.  Patient will report 2/10 pain at worst with reaching.  2.  Patient will demonstrate a 50% increase in R shoulder ROM for improved tolerance for reaching.  3.  Patient will demonstrate a 1 grade increase in R shoulder strength for improved tolerance for lifting/carrying.  4. Pt to tolerate HEP to improve ROM and independence with ADL's     Long Term Goals: 6 weeks  1  Patient will report 0/10 pain at worst with reaching.  2.  Patient will demonstrate a 75% in R shoulder ROM for improved tolerance reaching.  3.  Patient will demonstrate a 2 grade increase in R shoulder strength for improved tolerance for lifting/carrying.  4. Pt to be Independent with updated HEP to improve ROM and independence with ADL's    Plan     Continue PT towards established plan of care and goals.     Génesis Yeboah, PT

## 2018-12-17 ENCOUNTER — CLINICAL SUPPORT (OUTPATIENT)
Dept: REHABILITATION | Facility: HOSPITAL | Age: 83
End: 2018-12-17
Payer: MEDICARE

## 2018-12-17 DIAGNOSIS — M25.511 RIGHT SHOULDER PAIN, UNSPECIFIED CHRONICITY: Primary | ICD-10-CM

## 2018-12-17 PROCEDURE — 97140 MANUAL THERAPY 1/> REGIONS: CPT | Mod: PO

## 2018-12-17 NOTE — PROGRESS NOTES
"  Physical Therapy Daily Treatment Note     Name: Viktor Townsend .  Clinic Number: [id number]    Therapy Diagnosis:   Encounter Diagnosis   Name Primary?    Right shoulder pain, unspecified chronicity Yes     Physician: Nessa Phelan PA    Visit Date: 12/17/2018    Physician Orders: PT Eval and Treat R shoulder  Medical Diagnosis:   M25.511,G89.29 (ICD-10-CM) - Chronic right shoulder pain  Evaluation Date: 12/4/2018  Authorization period Expiration: 1/28/2019  Plan of Care Certification Period: 1/15/2019   Visit #: 4 Visits authorized: 12  Time In 1204  Time Out: 1255  TreatmentTime: 51 minutes  1:1 time:  12 minutes      Precautions: Standard    Subjective     Pt reports: that he feels like his shoulder is getting better.  Pain:  0/10 to (R) shoulder      Objective     Viktor received therapeutic exercises to develop  strength, endurance, ROM, flexibility and posture for 43 minutes including:     UBE 3'/3' level 1  AAROM on pulleys for shoulder flex stretch x 2 minutes   Doorway pec stretch 10 x 10"  Wall walks with T-ball for shoulder flex stretch x 10 with 5 sec hold  Scap retract/rows with OTB 2 x 10  Shoulder ext with OTB 2 x 10  Standing Scap retract/Protract with T-ball on plinth  X 30 -   Standing H-abd/Add with T-ball on plinth x 30   AAROM scaption with dowel assist 2 x 10 ( cues to avoid shoulder hike)  Supine AA shoulder flex with dowel  2 x 10 with 5 sec hold  SUpine A/A shoulder abd with dowel 2 x 10  scap protract in supine with dowel 2 x 10 (cues for correct form)  Sidelying shoulder  abd to 90 degrees 2 x 10  Sidelying ER 2 x 10   Patient receives PROM (R) shoulder flex/abd and ER/IR-NP  Attempted scarecrows but patient unable to perform.  Doorways stretch 10 x 10"     Viktor received the following manual therapy techniques: Joint mobilizations were applied to the: (R) shoulder  for 8 minutes including:  GH oscillations,  Posterior/inferior GH glides Grade ll  PROM to R shoulder        Written " Home Exercises Provided: Patient educated to continue with previously issued HEP to tolerance.  Exercises were reviewed and Viktor was able to demonstrate them prior to the end of the session.  Viktor demonstrated good  understanding of the education provided.       Assessment     Patient demonstrated significant difficulty and poor form with attempt of scarecrow exercise against the wall today.  Doorway stretch performed alternatively.  Patient continues to have pain at end range of flexion and abduction but ROM is much improved.    Pt will continue to benefit from skilled outpatient physical therapy to address the deficits listed in the problem list box on initial evaluation, provide pt/family education and to maximize pt's level of independence in the home and community environment.     Pt's spiritual, cultural and educational needs considered and pt agreeable to plan of care and goals.     GOALS: Short Term Goals:  3 weeks  1.  Patient will report 2/10 pain at worst with reaching.  2.  Patient will demonstrate a 50% increase in R shoulder ROM for improved tolerance for reaching.  3.  Patient will demonstrate a 1 grade increase in R shoulder strength for improved tolerance for lifting/carrying.  4. Pt to tolerate HEP to improve ROM and independence with ADL's     Long Term Goals: 6 weeks  1  Patient will report 0/10 pain at worst with reaching.  2.  Patient will demonstrate a 75% in R shoulder ROM for improved tolerance reaching.  3.  Patient will demonstrate a 2 grade increase in R shoulder strength for improved tolerance for lifting/carrying.  4. Pt to be Independent with updated HEP to improve ROM and independence with ADL's    Plan     Continue PT towards established plan of care and goals.     Génesis Yeboah, PT

## 2018-12-19 ENCOUNTER — CLINICAL SUPPORT (OUTPATIENT)
Dept: REHABILITATION | Facility: HOSPITAL | Age: 83
End: 2018-12-19
Payer: MEDICARE

## 2018-12-19 DIAGNOSIS — M25.511 RIGHT SHOULDER PAIN, UNSPECIFIED CHRONICITY: Primary | ICD-10-CM

## 2018-12-19 PROCEDURE — 97140 MANUAL THERAPY 1/> REGIONS: CPT | Mod: PO

## 2018-12-19 PROCEDURE — 97110 THERAPEUTIC EXERCISES: CPT | Mod: PO

## 2018-12-19 NOTE — PROGRESS NOTES
"  Physical Therapy Daily Treatment Note     Name: Viktor Townsend .  Clinic Number: [id number]    Therapy Diagnosis:   Encounter Diagnosis   Name Primary?    Right shoulder pain, unspecified chronicity Yes     Physician: Nessa Phelan PA    Visit Date: 12/19/2018    Physician Orders: PT Eval and Treat R shoulder  Medical Diagnosis:   M25.511,G89.29 (ICD-10-CM) - Chronic right shoulder pain  Evaluation Date: 12/4/2018  Authorization period Expiration: 1/28/2019  Plan of Care Certification Period: 1/15/2019   Visit #: 4 Visits authorized: 12  Time In 0200  Time Out: 0252  TreatmentTime: 52 minutes  1:1 time:  24 minutes      Precautions: Standard    Subjective     Pt reports: that he doesn't have any pain in his arm but his shoulder just feels stiff.    Pain:  0/10 to (R) shoulder      Objective     Viktor received therapeutic exercises to develop  strength, endurance, ROM, flexibility and posture for 42 minutes including:     UBE 3'/3' level 1  AAROM on pulleys for shoulder flex stretch x 2 minutes   Doorway pec stretch 10 x 10"  Wall walks with T-ball for shoulder flex stretch x 10 with 5 sec hold  Scap retract/rows with OTB 2 x 10  Shoulder ext with OTB 2 x 10  Standing Scap retract/Protract with T-ball on plinth  X 30 -   Standing H-abd/Add with T-ball on plinth x 30   AAROM scaption with dowel assist 2 x 10 ( cues to avoid shoulder hike)-NP  Supine AA shoulder flex with dowel  2 x 10 with 5 sec hold  SUpine A/A shoulder abd with dowel 2 x 10  scap protract in supine with dowel 2 x 10 (cues for correct form)  Sidelying shoulder  abd to 90 degrees 2 x 10  Sidelying ER 2 x 10   Patient receives PROM (R) shoulder flex/abd and ER/IR-NP     Viktor received the following manual therapy techniques: Joint mobilizations were applied to the: (R) shoulder  for 10 minutes including:  GH oscillations,  Posterior/inferior GH glides Grade ll  PROM to R shoulder        Written Home Exercises Provided: Patient educated to " continue with previously issued HEP to tolerance.  Exercises were reviewed and Viktor was able to demonstrate them prior to the end of the session.  Viktor demonstrated good  understanding of the education provided.       Assessment     Patient able to tolerate all therex during tx session today without reports of adverse effects or pain.  Patient continues to have difficulty with pain with passive mobility and intermittently demonstrates muscle guarding with PROM.  Patient requires verbal cueing for correct form with cane flexion and abduction.    Pt will continue to benefit from skilled outpatient physical therapy to address the deficits listed in the problem list box on initial evaluation, provide pt/family education and to maximize pt's level of independence in the home and community environment.     Pt's spiritual, cultural and educational needs considered and pt agreeable to plan of care and goals.     GOALS: Short Term Goals:  3 weeks  1.  Patient will report 2/10 pain at worst with reaching.  2.  Patient will demonstrate a 50% increase in R shoulder ROM for improved tolerance for reaching.  3.  Patient will demonstrate a 1 grade increase in R shoulder strength for improved tolerance for lifting/carrying.  4. Pt to tolerate HEP to improve ROM and independence with ADL's     Long Term Goals: 6 weeks  1  Patient will report 0/10 pain at worst with reaching.  2.  Patient will demonstrate a 75% in R shoulder ROM for improved tolerance reaching.  3.  Patient will demonstrate a 2 grade increase in R shoulder strength for improved tolerance for lifting/carrying.  4. Pt to be Independent with updated HEP to improve ROM and independence with ADL's    Plan     Continue PT towards established plan of care and goals.     Génesis Yeboah, PT

## 2018-12-24 ENCOUNTER — CLINICAL SUPPORT (OUTPATIENT)
Dept: REHABILITATION | Facility: HOSPITAL | Age: 83
End: 2018-12-24
Payer: MEDICARE

## 2018-12-24 DIAGNOSIS — M25.511 RIGHT SHOULDER PAIN, UNSPECIFIED CHRONICITY: Primary | ICD-10-CM

## 2018-12-24 PROCEDURE — 97110 THERAPEUTIC EXERCISES: CPT | Mod: PO

## 2018-12-24 NOTE — PROGRESS NOTES
"  Physical Therapy Daily Treatment Note     Name: Viktor Townsend .  Clinic Number: [id number]    Therapy Diagnosis:   Encounter Diagnosis   Name Primary?    Right shoulder pain, unspecified chronicity Yes     Physician: Nessa Phelan PA    Visit Date: 12/24/2018    Physician Orders: PT Eval and Treat R shoulder  Medical Diagnosis:   M25.511,G89.29 (ICD-10-CM) - Chronic right shoulder pain  Evaluation Date: 12/4/2018  Authorization period Expiration: 1/28/2019  Plan of Care Certification Period: 1/15/2019   Visit #: 6 Visits authorized: 12  Time In 12;00  Time Out: 12:50  TreatmentTime: 50 minutes  1:1 time:25 minutes      Precautions: Standard    Subjective     Pt reports: min (R) shoulder stiffness but not too much pain. He does c/o some lower back discomfort today.   Pain:  0/10 to (R) shoulder      Objective     Viktor received therapeutic exercises to develop  strength, endurance, ROM, flexibility and posture for 45 minutes including:     UBE 3'/3' level 1  AAROM on pulleys for shoulder flex stretch x 2 minutes   Doorway pec stretch 10 x 10"  Wall walks with T-ball for shoulder flex stretch x 10 with 5 sec hold  Scap retract/rows with OTB 2 x 10  Shoulder ext with OTB 2 x 10  Standing Scap retract/Protract with T-ball on plinth  X 30 -   Standing H-abd/Add with T-ball on plinth x 30   AAROM scaption with dowel assist 2 x 10 ( cues to avoid shoulder hike)-NP  Supine AA shoulder flex with dowel  2 x 10 with 5 sec hold  SUpine A/A shoulder abd with dowel 2 x 10  scap protract in supine with dowel 2 x 10 (cues for correct form)  Sidelying shoulder  abd to 90 degrees 2 x 10  Sidelying ER 2 x 10        Viktor received the following manual therapy techniques: Joint mobilizations were applied to the: (R) shoulder  for 5 minutes including:  GH oscillations,  Posterior/inferior GH glides Grade ll  PROM to R shoulder        Written Home Exercises Provided: Patient educated to continue with previously issued HEP to " tolerance.  Exercises were reviewed and Viktor was able to demonstrate them prior to the end of the session.  Viktor demonstrated good  understanding of the education provided.       Assessment   Patient shanique Tx fairly well. He was able to perform the above ex's/activities within tolerable muscular fatigue without increased pain.  Some generalized (R) shoulder weakness/stiffness continues to be evident. Attempted to add light resistance (1#)  to sidelying ER but could not perform correctly due to weakness and limited ROM  Requires cues to attempt to avoid compensatory movements with ex's along with cues for postural awareness.  No c/o pain post session. Will monitor and attempt to progress as tolerated       Pt will continue to benefit from skilled outpatient physical therapy to address the deficits listed in the problem list box on initial evaluation, provide pt/family education and to maximize pt's level of independence in the home and community environment.     Pt's spiritual, cultural and educational needs considered and pt agreeable to plan of care and goals.     GOALS: Short Term Goals:  3 weeks  1.  Patient will report 2/10 pain at worst with reaching.  2.  Patient will demonstrate a 50% increase in R shoulder ROM for improved tolerance for reaching.  3.  Patient will demonstrate a 1 grade increase in R shoulder strength for improved tolerance for lifting/carrying.  4. Pt to tolerate HEP to improve ROM and independence with ADL's     Long Term Goals: 6 weeks  1  Patient will report 0/10 pain at worst with reaching.  2.  Patient will demonstrate a 75% in R shoulder ROM for improved tolerance reaching.  3.  Patient will demonstrate a 2 grade increase in R shoulder strength for improved tolerance for lifting/carrying.  4. Pt to be Independent with updated HEP to improve ROM and independence with ADL's    Plan     Continue PT towards established plan of care and goals.     Avery Leos, PTA

## 2018-12-27 ENCOUNTER — CLINICAL SUPPORT (OUTPATIENT)
Dept: REHABILITATION | Facility: HOSPITAL | Age: 83
End: 2018-12-27
Payer: MEDICARE

## 2018-12-27 DIAGNOSIS — M25.511 RIGHT SHOULDER PAIN, UNSPECIFIED CHRONICITY: Primary | ICD-10-CM

## 2018-12-27 PROCEDURE — 97110 THERAPEUTIC EXERCISES: CPT | Mod: PO

## 2018-12-27 NOTE — PROGRESS NOTES
"  Physical Therapy Daily Treatment Note     Name: Viktor Townsend .  Clinic Number: [id number]    Therapy Diagnosis:   Encounter Diagnosis   Name Primary?    Right shoulder pain, unspecified chronicity Yes     Physician: Nessa Phelan PA    Visit Date: 12/27/2018    Physician Orders: PT Eval and Treat R shoulder  Medical Diagnosis:   M25.511,G89.29 (ICD-10-CM) - Chronic right shoulder pain  Evaluation Date: 12/4/2018  Authorization period Expiration: 1/28/2019  Plan of Care Certification Period: 1/15/2019   Visit #:7 Visits authorized: 12  Time In 11:00  Time Out: 11:50  TreatmentTime: 50 minutes  1:1 time:25 minutes      Precautions: Standard    Subjective     Pt reports: min (R) shoulder stiffness but not too much pain. States that it is feeling much better overall. He c/o fatigue due to busy X-mas time.   Pain:  0/10 to (R) shoulder      Objective     Viktor received therapeutic exercises to develop  strength, endurance, ROM, flexibility and posture for 45 minutes including:     UBE 3'/3' level 1  AAROM on pulleys for shoulder flex stretch x 2 minutes   Doorway pec stretch 10 x 10"  Wall walks with T-ball for shoulder flex stretch x 10 with 5 sec hold  Scap retract/rows with OTB 2 x 10  Shoulder ext with OTB 2 x 10  Standing Scap retract/Protract with T-ball on plinth  X 30 -   Standing H-abd/Add with T-ball on plinth x 30   AAROM scaption with dowel assist 2 x 10 ( cues to avoid shoulder hike)-NP  Supine AA shoulder flex with dowel with 1#   2 x 10 with 5 sec hold  SUpine A/A shoulder abd with dowel 2 x 10  scap protract in supine with dowel with 1# 2 x 10 (cues for correct form)  Sidelying shoulder  abd to 90 degrees 2 x 10  Sidelying ER 2 x 10   Supine A/A Shoulder D2 flex 2 x 10     Viktor received the following manual therapy techniques: Joint mobilizations were applied to the: (R) shoulder  for 5 minutes including:  GH oscillations,  Posterior/inferior GH glides Grade ll  PROM to R shoulder        Written " Home Exercises Provided: Patient educated to continue with previously issued HEP to tolerance.  Exercises were reviewed and Viktor was able to demonstrate them prior to the end of the session.  Viktor demonstrated good  understanding of the education provided.       Assessment   Patient shanique Tx fairly well. Added light resistance for dowel ex's in supine which he was able to perform without increased pain c/o. Also added supine D2 shoulder flex which he does require min A/A for coordination of movement to complete.   Some generalized (R) shoulder weakness/stiffness continues to be evident although he reports improvements in strength/pain control and activity tolerance since the start of care.   Requires cues to attempt to avoid compensatory movements with ex's along with cues for postural awareness.  No c/o pain post session. Will monitor and attempt to progress as tolerated       Pt will continue to benefit from skilled outpatient physical therapy to address the deficits listed in the problem list box on initial evaluation, provide pt/family education and to maximize pt's level of independence in the home and community environment.     Pt's spiritual, cultural and educational needs considered and pt agreeable to plan of care and goals.     GOALS: Short Term Goals:  3 weeks  1.  Patient will report 2/10 pain at worst with reaching.  2.  Patient will demonstrate a 50% increase in R shoulder ROM for improved tolerance for reaching.  3.  Patient will demonstrate a 1 grade increase in R shoulder strength for improved tolerance for lifting/carrying.  4. Pt to tolerate HEP to improve ROM and independence with ADL's     Long Term Goals: 6 weeks  1  Patient will report 0/10 pain at worst with reaching.  2.  Patient will demonstrate a 75% in R shoulder ROM for improved tolerance reaching.  3.  Patient will demonstrate a 2 grade increase in R shoulder strength for improved tolerance for lifting/carrying.  4. Pt to be Independent  with updated HEP to improve ROM and independence with ADL's    Plan     Continue PT towards established plan of care and goals.     Avery Leos, PTA

## 2019-01-07 ENCOUNTER — CLINICAL SUPPORT (OUTPATIENT)
Dept: REHABILITATION | Facility: HOSPITAL | Age: 84
End: 2019-01-07
Payer: MEDICARE

## 2019-01-07 DIAGNOSIS — M25.511 RIGHT SHOULDER PAIN, UNSPECIFIED CHRONICITY: Primary | ICD-10-CM

## 2019-01-07 PROCEDURE — 97110 THERAPEUTIC EXERCISES: CPT | Mod: PO

## 2019-01-07 RX ORDER — PRAVASTATIN SODIUM 20 MG/1
TABLET ORAL
Qty: 90 TABLET | Refills: 0 | Status: SHIPPED | OUTPATIENT
Start: 2019-01-07 | End: 2019-04-06 | Stop reason: SDUPTHER

## 2019-01-07 NOTE — PROGRESS NOTES
"  Physical Therapy Daily Treatment Note     Name: Viktor Townsend .  Clinic Number: [id number]    Therapy Diagnosis:   Encounter Diagnosis   Name Primary?    Right shoulder pain, unspecified chronicity Yes     Physician: Nessa Phelan PA    Visit Date: 1/7/2019    Physician Orders: PT Eval and Treat R shoulder  Medical Diagnosis:   M25.511,G89.29 (ICD-10-CM) - Chronic right shoulder pain  Evaluation Date: 12/4/2018  Authorization period Expiration: 1/28/2019  Plan of Care Certification Period: 1/15/2019   Visit #:8 Visits authorized: 12  Time In 3:00  Time Out: 3:45  TreatmentTime: 45  minutes  1:1 time:25 minutes      Precautions: Standard    Subjective     Pt reports: min (R) shoulder stiffness but not too much pain. States that it is feeling much better overall and was able to work in his garden over the weekend..    Pain:  0/10 to (R) shoulder      Objective          Viktor received therapeutic exercises to develop  strength, endurance, ROM, flexibility and posture for 40 minutes including:     UBE 3'/3' level 1  AAROM on pulleys for shoulder flex stretch x 2 minutes   Doorway pec stretch 10 x 10"  Wall walks with T-ball for shoulder flex stretch x 10 with 5 sec hold  Scap retract/rows with GTB 2 x 10  Shoulder ext with GTB 2 x 10  Standing Scap retract/Protract with T-ball on plinth  X 30 -   Standing H-abd/Add with T-ball on plinth x 30   AAROM scaption with dowel assist 2 x 10 ( cues to avoid shoulder hike)-NP  Supine AA shoulder flex with dowel with 3#   2 x 10 with 5 sec hold  SUpine A/A shoulder abd with dowel 2 x 10  scap protract in supine with  with 3# DB 2 x 10 (cues for correct form)  Sidelying shoulder abd 2 x 10  Sidelying H- abd  2 x 10  Sidelying ER 2 x 10   Supine  Shoulder D2 flex 2 x 10     Viktor received the following manual therapy techniques: Joint mobilizations were applied to the: (R) shoulder  for 5 minutes including:  GH oscillations,  Posterior/inferior GH glides Grade ll  PROM to R " shoulder        Written Home Exercises Provided: Patient educated to continue with previously issued HEP to tolerance.  Exercises were reviewed and Viktor was able to demonstrate them prior to the end of the session.  Viktor demonstrated good  understanding of the education provided.       Assessment   Patient shanique Tx fairly well. He was progressed with light resistance for dowel ex's in supine which he was able to perform without increased pain c/o.  Improved coordination and ability to perform supine D2 flex ex.    Some generalized (R) shoulder weakness/stiffness continues to be evident although he reports improvements in strength/pain control and activity tolerance since the start of care and was able to work in his garden without much difficulty over the weekend. Still requires occasional cues to attempt to avoid compensatory movements with ex's along with cues for postural awareness.  No c/o pain post session. Will monitor and attempt to progress as tolerated       Pt will continue to benefit from skilled outpatient physical therapy to address the deficits listed in the problem list box on initial evaluation, provide pt/family education and to maximize pt's level of independence in the home and community environment.     Pt's spiritual, cultural and educational needs considered and pt agreeable to plan of care and goals.     GOALS: Short Term Goals:  3 weeks  1.  Patient will report 2/10 pain at worst with reaching.  2.  Patient will demonstrate a 50% increase in R shoulder ROM for improved tolerance for reaching.  3.  Patient will demonstrate a 1 grade increase in R shoulder strength for improved tolerance for lifting/carrying.  4. Pt to tolerate HEP to improve ROM and independence with ADL's     Long Term Goals: 6 weeks  1  Patient will report 0/10 pain at worst with reaching.  2.  Patient will demonstrate a 75% in R shoulder ROM for improved tolerance reaching.  3.  Patient will demonstrate a 2 grade increase in  R shoulder strength for improved tolerance for lifting/carrying.  4. Pt to be Independent with updated HEP to improve ROM and independence with ADL's    Plan     Continue PT towards established plan of care and goals.     Avery Leos, PTA

## 2019-01-09 ENCOUNTER — CLINICAL SUPPORT (OUTPATIENT)
Dept: REHABILITATION | Facility: HOSPITAL | Age: 84
End: 2019-01-09
Payer: MEDICARE

## 2019-01-09 DIAGNOSIS — M25.511 RIGHT SHOULDER PAIN, UNSPECIFIED CHRONICITY: Primary | ICD-10-CM

## 2019-01-09 PROCEDURE — G8985 CARRY GOAL STATUS: HCPCS | Mod: CK,PO

## 2019-01-09 PROCEDURE — 97110 THERAPEUTIC EXERCISES: CPT | Mod: PO

## 2019-01-09 PROCEDURE — G8986 CARRY D/C STATUS: HCPCS | Mod: CJ,PO

## 2019-01-09 NOTE — PROGRESS NOTES
Physical Therapy Daily Treatment Note/Discharge Note     Name: Viktor Townsend .  Clinic Number: [id number]    Therapy Diagnosis:   Encounter Diagnosis   Name Primary?    Right shoulder pain, unspecified chronicity Yes     Physician: Nessa Phelan PA    Visit Date: 1/9/2019    Physician Orders: PT Eval and Treat R shoulder  Medical Diagnosis:   M25.511,G89.29 (ICD-10-CM) - Chronic right shoulder pain  Evaluation Date: 12/4/2018  Authorization period Expiration: 1/28/2019  Plan of Care Certification Period: 1/15/2019   Visit #:9 Visits authorized: 12  Time In 0258  Time Out: 0335  TreatmentTime: 37  Minutes  One on One treatment provided for the entire session     Precautions: Standard    Subjective     Pt reports: min (R) shoulder stiffness but not too much pain. States that it is feeling much better overall and was able to work in his garden over the weekend..    Pain:  0/10 to (R) shoulder  Pain worst:  2/10  Previous Level of Function: difficulty with reaching overhead, difficulty with lifting/carrying overhead, some difficulty with grooming hair and washing hair, pain with laying on the R arm  Current Level of Function:  Mild difficulty reaching overhead, mild difficulty with lifting/carrying overhead, trace difficulty with grooming/washing hair, able to lay on R side for a few hours  Objective     Observation: Unremarkable     Posture: Forward head, rounded shoulders         Passive Range of Motion:   Shoulder Left Right   Flexion n/a 178    Abduction n/a 150   ER at 90 n/a 70   IR n/a n/a      Active Range of Motion:   Shoulder Left Right   Flexion    Abduction    ER at 90 WFL 54   IR WFL WFL   Functional IR T10 T10   Functional ER C7 Occiput p!      Upper Extremity Strength  (R) UE   (L) UE     Shoulder flexion: 4+/5 p! Shoulder flexion: 5/5   Shoulder Abduction: 4-/5 p! Shoulder abduction: 5/5   Shoulder ER 4/5 Shoulder ER 4+/5   Shoulder IR 4/5 Shoulder IR 5/5   Shoulder extension 4+/5  "Shoulder extension 5/5   Elbow flexion 4+/5 Elbow flexion 5/5   Elbow extension 4+/5 Elbow extension 5/5            Special Tests:  AC Joint Left Rigth   Full cane (-) (+) just for pain    Empty Can Test (-) (+)   Drop Arm test (-) (-)         Joint Mobility: Minimally decreased posteriorly on the R      Palpation: denies any tenderness     Sensation: WFL     Scapular Control/Dyskinesis:     Normal / Subtle / Obvious  Comments    Left  Normal     Right  Obvious with abd                 CMS Impairment/Limitation/Restriction for FOTO Intake Survey     Therapist reviewed FOTO scores for Viktor Townsend Sr. on 12/4/2018.   FOTO documents entered into EP[employer] - see Media section.     Limitation Score: 66%  Category: Carrying     Current : CL = least 60% but < 80% impaired, limited or restricted  Goal: CK = at least 40% but < 60% impaired, limited or restricted        CMS Impairment/Limitation/Restriction for FOTO Intake Survey     Therapist reviewed FOTO scores for Viktor Townsend Sr. on 12/4/2018.   FOTO documents entered into EPIC - see Media section.     Limitation Score: 25%  Category: Carrying     Discharge : CJ = least 20% but < 40% impaired, limited or restricted  Goal: CK = at least 40% but < 60% impaired, limited or restricted          Viktor received therapeutic exercises to develop  strength, endurance, ROM, flexibility and posture for 40 minutes including:    Only exercises in bold performed    UBE 3'/3' level 1  AAROM on pulleys for shoulder flex stretch x 2 minutes   Doorway pec stretch 10 x 10"  Wall walks with T-ball for shoulder flex stretch x 10 with 5 sec hold  Scap retract/rows with GTB 2 x 10  Shoulder ext with GTB 2 x 10  Standing Scap retract/Protract with T-ball on plinth  X 30 -   Standing H-abd/Add with T-ball on plinth x 30   AAROM scaption with dowel assist 2 x 10 ( cues to avoid shoulder hike)-NP  Supine AA shoulder flex with dowel with 3#   2 x 10 with 5 sec hold  SUpine A/A shoulder abd with " dowel 2 x 10  scap protract in supine with  with 3# DB 2 x 10 (cues for correct form)  Sidelying shoulder abd 2 x 10  Sidelying H- abd  2 x 10  Sidelying ER 2 x 10   Supine  Shoulder D2 flex 2 x 10    Objective measures taken for purpose of discharge note.      Viktor received the following manual therapy techniques: Joint mobilizations were applied to the: (R) shoulder  for 5 minutes including:-NP  GH oscillations,  Posterior/inferior GH glides Grade ll  PROM to R shoulder        Written Home Exercises Provided: Patient educated to perform comprehensive HEP given to patient today.    Exercises were reviewed and Viktor was able to demonstrate them prior to the end of the session.  Viktor demonstrated good  understanding of the education provided.       Assessment   Viktor has done moderately well since initiating PT treatment.  Despite chronicity and degenerative nature of the patient's condition he was able to attain new goals and increased ROM and strength of the RUE.  Patient continues to have some difficulty with reaching behind his back and head but is much improved since initiating PT again.  Patient has improved tolerance for reaching overhead as well as lifting/carrying overhead but still has some limitations and pain.  Patient denies any difficulty sleeping and says he can generally perform his normal ADLs without much difficulty or restriction.  Patient will be discharged at this time secondary to attainment of most goals and good understanding of HEP and it's importance to continue.     GOALS: Short Term Goals:  3 weeks  1.  Patient will report 2/10 pain at worst with reaching.-MET  2.  Patient will demonstrate a 50% increase in R shoulder ROM for improved tolerance for reaching.  3.  Patient will demonstrate a 1 grade increase in R shoulder strength for improved tolerance for lifting/carrying.  4. Pt to tolerate HEP to improve ROM and independence with ADL's     Long Term Goals: 6 weeks  1  Patient will report  0/10 pain at worst with reaching.  2.  Patient will demonstrate a 75% in R shoulder ROM for improved tolerance reaching.  3.  Patient will demonstrate a 2 grade increase in R shoulder strength for improved tolerance for lifting/carrying.  4. Pt to be Independent with updated HEP to improve ROM and independence with ADL's    Plan     Patient will be discharged with full HEP today.    Génesis Yeboah, PT

## 2019-01-10 ENCOUNTER — OFFICE VISIT (OUTPATIENT)
Dept: INTERNAL MEDICINE | Facility: CLINIC | Age: 84
End: 2019-01-10
Attending: FAMILY MEDICINE
Payer: MEDICARE

## 2019-01-10 VITALS
OXYGEN SATURATION: 99 % | BODY MASS INDEX: 30.27 KG/M2 | DIASTOLIC BLOOD PRESSURE: 74 MMHG | HEART RATE: 63 BPM | HEIGHT: 67 IN | WEIGHT: 192.88 LBS | SYSTOLIC BLOOD PRESSURE: 132 MMHG

## 2019-01-10 DIAGNOSIS — N18.30 CKD (CHRONIC KIDNEY DISEASE) STAGE 3, GFR 30-59 ML/MIN: ICD-10-CM

## 2019-01-10 DIAGNOSIS — Z00.00 PREVENTATIVE HEALTH CARE: Primary | ICD-10-CM

## 2019-01-10 DIAGNOSIS — M1A.09X0 IDIOPATHIC CHRONIC GOUT OF MULTIPLE SITES WITHOUT TOPHUS: ICD-10-CM

## 2019-01-10 DIAGNOSIS — I10 ESSENTIAL HYPERTENSION: ICD-10-CM

## 2019-01-10 PROCEDURE — 3075F PR MOST RECENT SYSTOLIC BLOOD PRESS GE 130-139MM HG: ICD-10-PCS | Mod: CPTII,S$GLB,, | Performed by: FAMILY MEDICINE

## 2019-01-10 PROCEDURE — 99397 PR PREVENTIVE VISIT,EST,65 & OVER: ICD-10-PCS | Mod: S$GLB,,, | Performed by: FAMILY MEDICINE

## 2019-01-10 PROCEDURE — 3078F PR MOST RECENT DIASTOLIC BLOOD PRESSURE < 80 MM HG: ICD-10-PCS | Mod: CPTII,S$GLB,, | Performed by: FAMILY MEDICINE

## 2019-01-10 PROCEDURE — 99999 PR PBB SHADOW E&M-EST. PATIENT-LVL III: CPT | Mod: PBBFAC,,, | Performed by: FAMILY MEDICINE

## 2019-01-10 PROCEDURE — 99999 PR PBB SHADOW E&M-EST. PATIENT-LVL III: ICD-10-PCS | Mod: PBBFAC,,, | Performed by: FAMILY MEDICINE

## 2019-01-10 PROCEDURE — 99397 PER PM REEVAL EST PAT 65+ YR: CPT | Mod: S$GLB,,, | Performed by: FAMILY MEDICINE

## 2019-01-10 PROCEDURE — 3075F SYST BP GE 130 - 139MM HG: CPT | Mod: CPTII,S$GLB,, | Performed by: FAMILY MEDICINE

## 2019-01-10 PROCEDURE — 3078F DIAST BP <80 MM HG: CPT | Mod: CPTII,S$GLB,, | Performed by: FAMILY MEDICINE

## 2019-01-10 RX ORDER — ALLOPURINOL 300 MG/1
TABLET ORAL
Qty: 90 TABLET | Refills: 3 | Status: SHIPPED | OUTPATIENT
Start: 2019-01-10 | End: 2019-02-25 | Stop reason: SDUPTHER

## 2019-01-10 NOTE — PROGRESS NOTES
"Subjective:       Patient ID: Viktor Townsend Sr. is a 85 y.o. male.     Chief Complaint:  Annual exam     Mr. Townsend is an 84 y/o M with a PMH of gout and HTN who has presented today for his annual exam. He has had no recurrent flare-ups in his right hand, his left elbow, and his left great toe since starting allopurinol. He takes allopurinol every day.     Mr. Townsend's blood pressure is well controlled at this itme.      Review of Systems   Constitutional: Negative for activity change, appetite change, chills and fever.   HENT: Negative for congestion, rhinorrhea and sneezing.    Respiratory: Negative for cough, chest tightness, shortness of breath and wheezing.    Cardiovascular: Negative for chest pain, palpitations and leg swelling.   Gastrointestinal: Negative for abdominal pain, blood in stool, diarrhea and vomiting.   Genitourinary: Negative for dysuria, flank pain and frequency.   Musculoskeletal: Positive for arthralgias, joint swelling and myalgias. Negative for neck pain and neck stiffness.   Skin: Negative for color change and pallor.   Neurological: Negative for dizziness, weakness, light-headedness and headaches.   Hematological: Negative for adenopathy. Does not bruise/bleed easily.   Psychiatric/Behavioral: Negative for agitation and behavioral problems.       Objective:     BP (!) 142/73   Pulse 70   Ht 5' 7" (1.702 m)   Wt 85.6 kg (188 lb 11.4 oz)   SpO2 99%   BMI 29.56 kg/m²      Physical Exam   Constitutional: He is oriented to person, place, and time. He appears well-developed and well-nourished. No distress.   HENT:   Head: Normocephalic and atraumatic.   Eyes: EOM are normal. Pupils are equal, round, and reactive to light.   Neck: Normal range of motion. Neck supple.   Cardiovascular: Normal rate, regular rhythm, normal heart sounds and intact distal pulses.    Pulmonary/Chest: Effort normal and breath sounds normal. No respiratory distress. He has no wheezes.   Abdominal: Soft. " Bowel sounds are normal. He exhibits no distension. There is no tenderness.   Musculoskeletal: He exhibits no edema, tenderness or deformity.   Right shoulder range of motion limited.    Lymphadenopathy:     He has no cervical adenopathy.   Neurological: He is alert and oriented to person, place, and time. No cranial nerve deficit.   Skin: Skin is warm and dry. Capillary refill takes less than 2 seconds. No rash noted. He is not diaphoretic. No erythema. No pallor.   Psychiatric: He has a normal mood and affect. His behavior is normal.       Assessment:       1. Annual  Chronic right shoulder pain    2. Hand edema    3. CKD (chronic kidney disease) stage 3, GFR 30-59 ml/min        Plan:       Mr. Townsend is an 86 y/o M with a PMH of gout and HTN who has presented today for investigation of recurrent episodes of gout as well as chronic right shoulder pain.     1. Gout  - Prescribed colchicine 0.6 mg - 1 tablet 2x per day  - Continue allopurinol 300mg tablet 1x per day     2. Right Shoulder Pain  - Seen by orthopedics   - Continue tylenol PRN     3. HTN  - /74 today  - Continue lisinopril/hydrochlorothiazide 20-25mg     Recent blood work is up-to-date

## 2019-01-17 RX ORDER — LISINOPRIL AND HYDROCHLOROTHIAZIDE 20; 25 MG/1; MG/1
TABLET ORAL
Qty: 90 TABLET | Refills: 0 | Status: SHIPPED | OUTPATIENT
Start: 2019-01-17 | End: 2019-04-22 | Stop reason: SDUPTHER

## 2019-01-22 ENCOUNTER — LAB VISIT (OUTPATIENT)
Dept: LAB | Facility: HOSPITAL | Age: 84
End: 2019-01-22
Attending: INTERNAL MEDICINE
Payer: MEDICARE

## 2019-01-22 DIAGNOSIS — E53.8 B12 DEFICIENCY: ICD-10-CM

## 2019-01-22 DIAGNOSIS — D47.2 MGUS (MONOCLONAL GAMMOPATHY OF UNKNOWN SIGNIFICANCE): ICD-10-CM

## 2019-01-22 LAB — VIT B12 SERPL-MCNC: 343 PG/ML

## 2019-01-22 PROCEDURE — 84165 PROTEIN E-PHORESIS SERUM: CPT | Mod: 26,,, | Performed by: PATHOLOGY

## 2019-01-22 PROCEDURE — 84165 PROTEIN E-PHORESIS SERUM: CPT

## 2019-01-22 PROCEDURE — 36415 COLL VENOUS BLD VENIPUNCTURE: CPT

## 2019-01-22 PROCEDURE — 82607 VITAMIN B-12: CPT

## 2019-01-22 PROCEDURE — 84165 PATHOLOGIST INTERPRETATION SPE: ICD-10-PCS | Mod: 26,,, | Performed by: PATHOLOGY

## 2019-01-23 ENCOUNTER — TELEPHONE (OUTPATIENT)
Dept: HEMATOLOGY/ONCOLOGY | Facility: CLINIC | Age: 84
End: 2019-01-23

## 2019-01-23 LAB
ALBUMIN SERPL ELPH-MCNC: 3.91 G/DL
ALPHA1 GLOB SERPL ELPH-MCNC: 0.26 G/DL
ALPHA2 GLOB SERPL ELPH-MCNC: 0.81 G/DL
B-GLOBULIN SERPL ELPH-MCNC: 0.61 G/DL
GAMMA GLOB SERPL ELPH-MCNC: 1 G/DL
PATHOLOGIST INTERPRETATION SPE: NORMAL
PROT SERPL-MCNC: 6.6 G/DL

## 2019-01-23 NOTE — TELEPHONE ENCOUNTER
Called pt and informed protein study unchanged.   Pt states taking B12 1000 daily and understands need to take indefinitely.   Pt verbalized understanding.         ----- Message from Charlie Carpio Jr., MD sent at 1/23/2019  2:09 PM CST -----  Protein study unchanged.  Check to be sure he is taking vitamin B12, 1000 mcg daily and to take it forever

## 2019-02-25 RX ORDER — ALLOPURINOL 300 MG/1
TABLET ORAL
Qty: 90 TABLET | Refills: 3 | Status: SHIPPED | OUTPATIENT
Start: 2019-02-25 | End: 2020-03-17

## 2019-02-25 NOTE — TELEPHONE ENCOUNTER
----- Message from Sara Oshea sent at 2/25/2019 10:23 AM CST -----  Contact: Lou Karimi ( wife )  Name of Who is Calling: Lou Karimi ( wife )       What is the request in detail: Patient is requesting a call from staff she states he is out of his medication ( allopurinol (ZYLOPRIM) 300 MG tablet )  and the pharmacy won't refill and she needs to talk with staff .....Please contact to further discuss and advise.     Can the clinic reply by [facility]: no     What Number to Call Back if not in [facility]: [phone]

## 2019-03-18 ENCOUNTER — TELEPHONE (OUTPATIENT)
Dept: ADMINISTRATIVE | Facility: CLINIC | Age: 84
End: 2019-03-18

## 2019-03-18 NOTE — TELEPHONE ENCOUNTER
Spoke with Mr. Townsend wife todd CHARLETTE schedule 03/25/19 @ 1pm [facility] location

## 2019-03-26 ENCOUNTER — TELEPHONE (OUTPATIENT)
Dept: ORTHOPEDICS | Facility: CLINIC | Age: 84
End: 2019-03-26

## 2019-03-26 NOTE — TELEPHONE ENCOUNTER
----- Message from Francia Mclean sent at 3/26/2019  2:36 PM CDT -----  Contact: jenna  Name of Who is Calling: jenna      What is the request in detail: Patient is requesting a call he wants to see if there will set up a MRI for his shoulder he states he is still having shoulder pain       Can the clinic reply by [facility]: no      What Number to Call Back if not in [facility]: [phone]

## 2019-04-08 ENCOUNTER — TELEPHONE (OUTPATIENT)
Dept: HEMATOLOGY/ONCOLOGY | Facility: CLINIC | Age: 84
End: 2019-04-08

## 2019-04-08 RX ORDER — PRAVASTATIN SODIUM 20 MG/1
TABLET ORAL
Qty: 90 TABLET | Refills: 0 | Status: SHIPPED | OUTPATIENT
Start: 2019-04-08 | End: 2020-10-15 | Stop reason: SDUPTHER

## 2019-04-08 RX ORDER — PRAVASTATIN SODIUM 20 MG/1
TABLET ORAL
Qty: 90 TABLET | Refills: 0 | Status: SHIPPED | OUTPATIENT
Start: 2019-04-08 | End: 2019-07-07 | Stop reason: SDUPTHER

## 2019-04-08 NOTE — TELEPHONE ENCOUNTER
----- Message from Poonam Owen sent at 4/8/2019  3:28 PM CDT -----  Contact: pt wife   Pt wife called to speak with nurse Renetta have some questions about Pt labs  Callback#[phone]  Thank You  LETITIA Owen     Spoke with patient. PCP will follow B12 level, per Dr Carpio, patient to stay on B12 forever.

## 2019-04-22 RX ORDER — LISINOPRIL AND HYDROCHLOROTHIAZIDE 20; 25 MG/1; MG/1
TABLET ORAL
Qty: 90 TABLET | Refills: 0 | Status: SHIPPED | OUTPATIENT
Start: 2019-04-22 | End: 2019-07-19 | Stop reason: SDUPTHER

## 2019-05-17 ENCOUNTER — TELEPHONE (OUTPATIENT)
Dept: NEPHROLOGY | Facility: CLINIC | Age: 84
End: 2019-05-17

## 2019-05-21 ENCOUNTER — TELEPHONE (OUTPATIENT)
Dept: INTERNAL MEDICINE | Facility: CLINIC | Age: 84
End: 2019-05-21

## 2019-05-21 NOTE — TELEPHONE ENCOUNTER
----- Message from Brooke Michelle sent at 5/21/2019 11:11 AM CDT -----  Name of Who is Calling: EVANS CANO SR. [[id number]]    What is the request in detail: Pt would like to discuss an asbestos check up. Not sure what he's saying please call ASAP.    Can the clinic reply by [facility]:   No       What Number to Call Back [facility]: [phone]

## 2019-05-22 ENCOUNTER — OFFICE VISIT (OUTPATIENT)
Dept: INTERNAL MEDICINE | Facility: CLINIC | Age: 84
End: 2019-05-22
Attending: FAMILY MEDICINE
Payer: MEDICARE

## 2019-05-22 ENCOUNTER — HOSPITAL ENCOUNTER (OUTPATIENT)
Dept: RADIOLOGY | Facility: OTHER | Age: 84
Discharge: HOME | End: 2019-05-22
Attending: FAMILY MEDICINE
Payer: MEDICARE

## 2019-05-22 VITALS
HEART RATE: 66 BPM | OXYGEN SATURATION: 100 % | HEIGHT: 67 IN | BODY MASS INDEX: 29.9 KG/M2 | WEIGHT: 190.5 LBS | SYSTOLIC BLOOD PRESSURE: 132 MMHG | DIASTOLIC BLOOD PRESSURE: 76 MMHG

## 2019-05-22 DIAGNOSIS — G89.29 CHRONIC RIGHT SHOULDER PAIN: ICD-10-CM

## 2019-05-22 DIAGNOSIS — Z77.090 ASBESTOS EXPOSURE: ICD-10-CM

## 2019-05-22 DIAGNOSIS — I10 ESSENTIAL HYPERTENSION: ICD-10-CM

## 2019-05-22 DIAGNOSIS — M25.511 CHRONIC RIGHT SHOULDER PAIN: ICD-10-CM

## 2019-05-22 DIAGNOSIS — N18.30 CKD (CHRONIC KIDNEY DISEASE) STAGE 3, GFR 30-59 ML/MIN: ICD-10-CM

## 2019-05-22 DIAGNOSIS — Z77.090 ASBESTOS EXPOSURE: Primary | ICD-10-CM

## 2019-05-22 DIAGNOSIS — M1A.09X0 IDIOPATHIC CHRONIC GOUT OF MULTIPLE SITES WITHOUT TOPHUS: ICD-10-CM

## 2019-05-22 PROCEDURE — 99999 PR PBB SHADOW E&M-EST. PATIENT-LVL III: ICD-10-PCS | Mod: PBBFAC,,, | Performed by: FAMILY MEDICINE

## 2019-05-22 PROCEDURE — 71046 X-RAY EXAM CHEST 2 VIEWS: CPT | Mod: TC,FY

## 2019-05-22 PROCEDURE — 1101F PT FALLS ASSESS-DOCD LE1/YR: CPT | Mod: CPTII,S$GLB,, | Performed by: FAMILY MEDICINE

## 2019-05-22 PROCEDURE — 71046 XR CHEST PA AND LATERAL: ICD-10-PCS | Mod: 26,,, | Performed by: RADIOLOGY

## 2019-05-22 PROCEDURE — 99214 PR OFFICE/OUTPT VISIT, EST, LEVL IV, 30-39 MIN: ICD-10-PCS | Mod: S$GLB,,, | Performed by: FAMILY MEDICINE

## 2019-05-22 PROCEDURE — 1101F PR PT FALLS ASSESS DOC 0-1 FALLS W/OUT INJ PAST YR: ICD-10-PCS | Mod: CPTII,S$GLB,, | Performed by: FAMILY MEDICINE

## 2019-05-22 PROCEDURE — 99499 RISK ADDL DX/OHS AUDIT: ICD-10-PCS | Mod: S$GLB,,, | Performed by: FAMILY MEDICINE

## 2019-05-22 PROCEDURE — 99999 PR PBB SHADOW E&M-EST. PATIENT-LVL III: CPT | Mod: PBBFAC,,, | Performed by: FAMILY MEDICINE

## 2019-05-22 PROCEDURE — 99214 OFFICE O/P EST MOD 30 MIN: CPT | Mod: S$GLB,,, | Performed by: FAMILY MEDICINE

## 2019-05-22 PROCEDURE — 71046 X-RAY EXAM CHEST 2 VIEWS: CPT | Mod: 26,,, | Performed by: RADIOLOGY

## 2019-05-22 PROCEDURE — 99499 UNLISTED E&M SERVICE: CPT | Mod: S$GLB,,, | Performed by: FAMILY MEDICINE

## 2019-05-22 NOTE — PROGRESS NOTES
Subjective:       Patient ID: Viktor Townsend Sr. is a 85 y.o. male.     Chief Complaint: Shoulder Pain (right)     Mr. Townsend is an 86 y/o M with a PMH of gout and HTN who has presented today for recurrent flares of gout as well as right shoulder pain. Mr. Townsend reports that 2 weeks ago his left elbow become severely swollen and painful. He went to an urgent care where they diagnosed the swelling as gout, and gave him a gluteal shot of cortisone. They could not perform lab tests on the weekend and suggested he follow up with his PCP for the recurrent episodes of gout. He has had recurrent flare-ups in his right hand, his left elbow, and his left great toe. He has been prescribed colchicine in the past which he ceased taking. He takes allopurinol every day.     Mr. Townsend also reports a fall onto his right shoulder in December 2017. An xray of the right shoulder showed no abnormalities, and he was referred to physical therapy. He attended physical therapy for 2 months with no relief of the pain and difficulty with shoulder movement. 1 month ago he was given a steroid shot into the right shoulder, which did not relieve the pain and discomfort. Mr. Townsend reports that he is unable to sleep on his right shoulder, unable to lift his right arm above 90 degrees, and has pain with movement.      Mr. Townsend's blood pressure is well controlled at this itme.      Review of Systems   Constitutional: Negative for activity change, appetite change, chills and fever.   HENT: Negative for congestion, rhinorrhea and sneezing.    Respiratory: Negative for cough, chest tightness, shortness of breath and wheezing.    Cardiovascular: Negative for chest pain, palpitations and leg swelling.   Gastrointestinal: Negative for abdominal pain, blood in stool, diarrhea and vomiting.   Genitourinary: Negative for dysuria, flank pain and frequency.   Musculoskeletal: Positive for arthralgias, joint swelling and myalgias. Negative for  "neck pain and neck stiffness.   Skin: Negative for color change and pallor.   Neurological: Negative for dizziness, weakness, light-headedness and headaches.   Hematological: Negative for adenopathy. Does not bruise/bleed easily.   Psychiatric/Behavioral: Negative for agitation and behavioral problems.       Objective:     BP (!) 142/73   Pulse 70   Ht 5' 7" (1.702 m)   Wt 85.6 kg (188 lb 11.4 oz)   SpO2 99%   BMI 29.56 kg/m²      Physical Exam   Constitutional: He is oriented to person, place, and time. He appears well-developed and well-nourished. No distress.   HENT:   Head: Normocephalic and atraumatic.   Eyes: EOM are normal. Pupils are equal, round, and reactive to light.   Neck: Normal range of motion. Neck supple.   Cardiovascular: Normal rate, regular rhythm, normal heart sounds and intact distal pulses.    Pulmonary/Chest: Effort normal and breath sounds normal. No respiratory distress. He has no wheezes.   Abdominal: Soft. Bowel sounds are normal. He exhibits no distension. There is no tenderness.   Musculoskeletal: He exhibits no edema, tenderness or deformity.   Right shoulder range of motion limited. Patient able to abduct right arm to 90 degrees. Reports that pain begins at 90 degrees and he is unable to lift arm further. Passive movement of arm further than 90 degrees reproduces pain.  No swelling or erythema noted in left elbow at this time.   Lymphadenopathy:     He has no cervical adenopathy.   Neurological: He is alert and oriented to person, place, and time. No cranial nerve deficit.   Skin: Skin is warm and dry. Capillary refill takes less than 2 seconds. No rash noted. He is not diaphoretic. No erythema. No pallor.   Psychiatric: He has a normal mood and affect. His behavior is normal.       Assessment:       1. Chronic right shoulder pain    2. Hand edema    3. CKD (chronic kidney disease) stage 3, GFR 30-59 ml/min      Occupational exposure to asbestos  Plan:       Mr. Townsend is an 86 " y/o M with a PMH of gout and HTN who has presented today for investigation of occupational exposure to asbestos     1.  We had a long discussion.  He declines pulmonary referral.  He is comfortable with getting an x-ray of the chest.  It is a normal chest x-ray and he does not intend to do anything further.     2. Right Shoulder Pain     3. HTN  - /76 today  - Continue lisinopril/hydrochlorothiazide 20-25mg

## 2019-05-23 ENCOUNTER — TELEPHONE (OUTPATIENT)
Dept: INTERNAL MEDICINE | Facility: CLINIC | Age: 84
End: 2019-05-23

## 2019-05-23 NOTE — TELEPHONE ENCOUNTER
----- Message from Kalia Jones MD sent at 5/22/2019  3:45 PM CDT -----  No abnormality seen on chest x-ray.  No specific follow-up is needed.

## 2019-06-25 ENCOUNTER — OFFICE VISIT (OUTPATIENT)
Dept: INTERNAL MEDICINE | Facility: CLINIC | Age: 84
End: 2019-06-25
Payer: MEDICARE

## 2019-06-25 VITALS
HEART RATE: 109 BPM | DIASTOLIC BLOOD PRESSURE: 62 MMHG | HEIGHT: 63 IN | BODY MASS INDEX: 32.77 KG/M2 | SYSTOLIC BLOOD PRESSURE: 130 MMHG | OXYGEN SATURATION: 98 % | WEIGHT: 184.94 LBS

## 2019-06-25 DIAGNOSIS — R73.03 PREDIABETES: ICD-10-CM

## 2019-06-25 DIAGNOSIS — K44.9 HIATAL HERNIA: ICD-10-CM

## 2019-06-25 DIAGNOSIS — Z00.00 ENCOUNTER FOR PREVENTIVE HEALTH EXAMINATION: Primary | ICD-10-CM

## 2019-06-25 DIAGNOSIS — Z85.46 PERSONAL HISTORY OF PROSTATE CANCER: ICD-10-CM

## 2019-06-25 DIAGNOSIS — D64.9 ANEMIA, UNSPECIFIED TYPE: ICD-10-CM

## 2019-06-25 DIAGNOSIS — Z13.6 ENCOUNTER FOR ABDOMINAL AORTIC ANEURYSM SCREENING: ICD-10-CM

## 2019-06-25 DIAGNOSIS — K57.90 DIVERTICULOSIS OF INTESTINE WITHOUT BLEEDING, UNSPECIFIED INTESTINAL TRACT LOCATION: ICD-10-CM

## 2019-06-25 DIAGNOSIS — I10 ESSENTIAL HYPERTENSION: ICD-10-CM

## 2019-06-25 DIAGNOSIS — M65.30 TRIGGER FINGER, ACQUIRED: ICD-10-CM

## 2019-06-25 DIAGNOSIS — D89.2 PARAPROTEINEMIA: ICD-10-CM

## 2019-06-25 DIAGNOSIS — E78.00 PURE HYPERCHOLESTEROLEMIA: ICD-10-CM

## 2019-06-25 DIAGNOSIS — N18.30 HYPERTENSIVE KIDNEY DISEASE WITH STAGE 3 CHRONIC KIDNEY DISEASE: ICD-10-CM

## 2019-06-25 DIAGNOSIS — I12.9 HYPERTENSIVE KIDNEY DISEASE WITH STAGE 3 CHRONIC KIDNEY DISEASE: ICD-10-CM

## 2019-06-25 DIAGNOSIS — N18.30 CKD (CHRONIC KIDNEY DISEASE) STAGE 3, GFR 30-59 ML/MIN: ICD-10-CM

## 2019-06-25 DIAGNOSIS — M25.511 RIGHT SHOULDER PAIN, UNSPECIFIED CHRONICITY: ICD-10-CM

## 2019-06-25 DIAGNOSIS — M1A.09X0 IDIOPATHIC CHRONIC GOUT OF MULTIPLE SITES WITHOUT TOPHUS: ICD-10-CM

## 2019-06-25 DIAGNOSIS — D47.2 MGUS (MONOCLONAL GAMMOPATHY OF UNKNOWN SIGNIFICANCE): ICD-10-CM

## 2019-06-25 DIAGNOSIS — K63.5 POLYP OF COLON, UNSPECIFIED PART OF COLON, UNSPECIFIED TYPE: ICD-10-CM

## 2019-06-25 DIAGNOSIS — K40.90 LEFT INGUINAL HERNIA: ICD-10-CM

## 2019-06-25 DIAGNOSIS — R49.0 HOARSENESS: ICD-10-CM

## 2019-06-25 DIAGNOSIS — N18.30 STAGE 3 CHRONIC KIDNEY DISEASE: ICD-10-CM

## 2019-06-25 PROCEDURE — 99499 UNLISTED E&M SERVICE: CPT | Mod: S$GLB,,, | Performed by: NURSE PRACTITIONER

## 2019-06-25 PROCEDURE — G0439 PR MEDICARE ANNUAL WELLNESS SUBSEQUENT VISIT: ICD-10-PCS | Mod: S$GLB,,, | Performed by: NURSE PRACTITIONER

## 2019-06-25 PROCEDURE — 99999 PR PBB SHADOW E&M-EST. PATIENT-LVL IV: ICD-10-PCS | Mod: PBBFAC,,, | Performed by: NURSE PRACTITIONER

## 2019-06-25 PROCEDURE — 99499 RISK ADDL DX/OHS AUDIT: ICD-10-PCS | Mod: S$GLB,,, | Performed by: NURSE PRACTITIONER

## 2019-06-25 PROCEDURE — 99999 PR PBB SHADOW E&M-EST. PATIENT-LVL IV: CPT | Mod: PBBFAC,,, | Performed by: NURSE PRACTITIONER

## 2019-06-25 PROCEDURE — G0439 PPPS, SUBSEQ VISIT: HCPCS | Mod: S$GLB,,, | Performed by: NURSE PRACTITIONER

## 2019-06-25 NOTE — PROGRESS NOTES
"Viktor Townsend presented for a  Medicare AWV and comprehensive Health Risk Assessment today. The following components were reviewed and updated:    · Medical history  · Family History  · Social history  · Allergies and Current Medications  · Health Risk Assessment  · Health Maintenance  · Care Team     ** See Completed Assessments for Annual Wellness Visit within the encounter summary.**       The following assessments were completed:  · Living Situation  · CAGE  · Depression Screening  · Timed Get Up and Go  · Whisper Test  · Cognitive Function Screening  · Nutrition Screening  · ADL Screening  · PAQ Screening          Vitals:    06/25/19 1349   BP: 130/62   BP Location: Left arm   Patient Position: Sitting   Pulse: 109   SpO2: 98%   Weight: 83.9 kg (184 lb 15.5 oz)   Height: 5' 3" (1.6 m)     Body mass index is 32.77 kg/m².    Physical Exam   Constitutional: He is oriented to person, place, and time.   Obese   HENT:   Head: Normocephalic and atraumatic. Not macrocephalic and not microcephalic. Head is without raccoon's eyes, without Mario's sign, without abrasion, without contusion, without laceration, without right periorbital erythema and without left periorbital erythema. Hair is normal.   Right Ear: No lacerations. No drainage, swelling or tenderness. No foreign bodies. No mastoid tenderness. Tympanic membrane is not injected, not scarred, not perforated, not erythematous, not retracted and not bulging. Tympanic membrane mobility is normal. No middle ear effusion. No hemotympanum. No decreased hearing is noted.   Left Ear: No lacerations. No drainage, swelling or tenderness. No foreign bodies. No mastoid tenderness. Tympanic membrane is not injected, not scarred, not perforated, not erythematous, not retracted and not bulging. Tympanic membrane mobility is normal.  No middle ear effusion. No hemotympanum. No decreased hearing is noted.   Nose: Nose normal. No mucosal edema, rhinorrhea, nose lacerations, sinus " tenderness or nasal deformity.   Mouth/Throat: Uvula is midline.   Eyes: Conjunctivae and lids are normal. No scleral icterus.   Neck: Trachea normal. Neck supple. No spinous process tenderness and no muscular tenderness present. No neck rigidity. No edema, no erythema and normal range of motion present. No thyroid mass and no thyromegaly present.   Cardiovascular: Normal rate, regular rhythm, normal heart sounds and intact distal pulses. Exam reveals no gallop and no friction rub.   No murmur heard.  Pulmonary/Chest: Effort normal and breath sounds normal. No stridor. No respiratory distress. He has no wheezes. He has no rales. He exhibits no tenderness.   Abdominal: Soft. Bowel sounds are normal. He exhibits no distension.   Musculoskeletal: Normal range of motion.   Lymphadenopathy:        Head (right side): No submental, no submandibular, no tonsillar, no preauricular and no posterior auricular adenopathy present.        Head (left side): No submental, no submandibular, no tonsillar, no preauricular, no posterior auricular and no occipital adenopathy present.   Neurological: He is alert and oriented to person, place, and time.   Skin: Skin is warm and dry.   Psychiatric: He has a normal mood and affect. His behavior is normal. Judgment and thought content normal.   Vitals reviewed.      Diagnoses and health risks identified today and associated recommendations/orders:    1. Encounter for preventive health examination  Annual Health Risk Assessment (HRA) visit today.  Counseling and referral of health maintenance and preventative health measures performed.  Patient given annual wellness paperwork to take home.  Encouraged to return in 1 year for subsequent HRA visit.     2. CKD (chronic kidney disease) stage 3, GFR 30-59 ml/min  Chronic. Stable. Continue current treatment plan as previously prescribed by PCP.    3. Hypertensive kidney disease with stage 3 chronic kidney disease  Chronic. Stable. Continue current  treatment plan as previously prescribed by PCP.    4. Stage 3 chronic kidney disease  Chronic. Stable. Continue current treatment plan as previously prescribed by PCP.    5. Prediabetes  Chronic. Stable. Last Hgb A1c=5.9 from 2/6/17. Continue current treatment plan as previously prescribed by PCP.    6. Pure hypercholesterolemia  Chronic. Stable. Continue current treatment plan as previously prescribed by PCP.    7. Essential hypertension  Chronic. Stable. Controlled. Encouraged to increase exercise as tolerated (moderate-intensity aerobic activity and muscle-strengthening activities) improve diet to heart healthy, low sodium diet. Continue current treatment plan as previously prescribed by PCP.    8. Personal history of prostate cancer  Chronic. Stable. Continue current treatment plan as previously prescribed by PCP.    9. Anemia, unspecified type  Chronic. Stable. Continue current treatment plan as previously prescribed by PCP.    10. MGUS (monoclonal gammopathy of unknown significance)  Chronic. Stable. Continue current treatment plan as previously prescribed by PCP.    11. Paraproteinemia  Chronic. Stable. Continue current treatment plan as previously prescribed by PCP.    12. Encounter for abdominal aortic aneurysm screening  -  AAA Screening; Future    13. Left inguinal hernia  Chronic. Stable. Continue current treatment plan as previously prescribed by PCP.]    14. Polyp of colon, unspecified part of colon, unspecified type  Chronic. Stable. Continue current treatment plan as previously prescribed by PCP.    15. Hiatal hernia  Chronic. Stable. Continue current treatment plan as previously prescribed by PCP.\    16. Diverticulosis of intestine without bleeding, unspecified intestinal tract location  Chronic. Stable. Continue current treatment plan as previously prescribed by PCP.    17. Right shoulder pain, unspecified chronicity  Chronic. Stable. Continue current treatment plan as previously prescribed by  PCP.    18. Idiopathic chronic gout of multiple sites without tophus  Chronic. Stable. Continue current treatment plan as previously prescribed by PCP.    19. Trigger finger, acquired  Chronic. Stable. Continue current treatment plan as previously prescribed by PCP.    20. Hoarseness  Chronic. Stable. Continue current treatment plan as previously prescribed by PCP.        Provided Viktor with a 5-10 year written screening schedule and personal prevention plan. Recommendations were developed using the USPSTF age appropriate recommendations. Education, counseling, and referrals were provided as needed. After Visit Summary printed and given to patient which includes a list of additional screenings\tests needed.    Follow up in about 1 year (around 6/25/2020).    Balaji Escobar NP  I offered to discuss end of life issues, including information on how to make advance directives that the patient could use to name someone who would make medical decisions on their behalf if they became too ill to make themselves.    ___Patient declined  _X_Patient is interested, I provided paper work and offered to discuss.

## 2019-06-25 NOTE — PATIENT INSTRUCTIONS
Counseling and Referral of Other Preventative  (Italic type indicates deductible and co-insurance are waived)    Patient Name: Viktor Townsend  Today's Date: 6/25/2019    Health Maintenance       Date Due Completion Date    Shingles Vaccine (1 of 2) 05/09/1983 ---    Abdominal Aortic Aneurysm Screening 05/09/1998 ---    Pneumococcal Vaccine (65+ Low/Medium Risk) (2 of 2 - PPSV23) 08/08/2017 8/8/2016    Influenza Vaccine 08/01/2019 11/8/2018    Override on 10/12/2016: Done    Lipid Panel 07/26/2022 7/26/2017    TETANUS VACCINE 08/08/2026 8/8/2016        No orders of the defined types were placed in this encounter.    The following information is provided to all patients.  This information is to help you find resources for any of the problems found today that may be affecting your health:                Living healthy guide: www.[facility].[place].gov      Understanding Diabetes: www.diabetes.org      Eating healthy: www.cdc.gov/healthyweight      [facility] home safety checklist: www.cdc.gov/steadi/patient.html      Agency on Aging: www.goea.[place].[facility]      Alcoholics anonymous (AA): www.aa.org      Physical Activity: www.ted.nih.gov/iz9qxqw      Tobacco use: www.quitwithusla.org

## 2019-06-26 ENCOUNTER — IMMUNIZATION (OUTPATIENT)
Dept: OPHTHALMOLOGY | Facility: CLINIC | Age: 84
End: 2019-06-26
Payer: MEDICARE

## 2019-06-26 ENCOUNTER — TELEPHONE (OUTPATIENT)
Dept: NEPHROLOGY | Facility: CLINIC | Age: 84
End: 2019-06-26

## 2019-06-26 DIAGNOSIS — N18.30 STAGE 3 CHRONIC KIDNEY DISEASE: Primary | ICD-10-CM

## 2019-07-08 RX ORDER — PRAVASTATIN SODIUM 20 MG/1
TABLET ORAL
Qty: 90 TABLET | Refills: 3 | Status: SHIPPED | OUTPATIENT
Start: 2019-07-08 | End: 2019-10-07 | Stop reason: SDUPTHER

## 2019-07-17 ENCOUNTER — TELEPHONE (OUTPATIENT)
Dept: INTERNAL MEDICINE | Facility: CLINIC | Age: 84
End: 2019-07-17

## 2019-07-17 NOTE — TELEPHONE ENCOUNTER
----- Message from Kirstie Lucio sent at 7/17/2019  9:19 AM CDT -----  Contact: Pt    Name of Who is Calling:     What is the request in detail: Patient wife would like a call back regarding why patient needs have AAA Ultrasound done Please contact to further discuss and advise    Can the clinic reply by [facility]: No    What [facility]: [phone]

## 2019-07-17 NOTE — TELEPHONE ENCOUNTER
Pt wife inform that the AAA U/S is for preventive care purpose.Pt wife agrees and will verbalize with pt.

## 2019-07-19 ENCOUNTER — HOSPITAL ENCOUNTER (OUTPATIENT)
Dept: RADIOLOGY | Facility: OTHER | Age: 84
Discharge: HOME | End: 2019-07-19
Attending: NURSE PRACTITIONER
Payer: MEDICARE

## 2019-07-19 DIAGNOSIS — Z13.6 ENCOUNTER FOR ABDOMINAL AORTIC ANEURYSM SCREENING: ICD-10-CM

## 2019-07-19 PROCEDURE — 76706 US AAA SCREENING: ICD-10-PCS | Mod: 26,,, | Performed by: RADIOLOGY

## 2019-07-19 PROCEDURE — 76706 US ABDL AORTA SCREEN AAA: CPT | Mod: 26,,, | Performed by: RADIOLOGY

## 2019-07-19 PROCEDURE — 76706 US ABDL AORTA SCREEN AAA: CPT | Mod: TC

## 2019-07-19 RX ORDER — LISINOPRIL AND HYDROCHLOROTHIAZIDE 20; 25 MG/1; MG/1
TABLET ORAL
Qty: 90 TABLET | Refills: 0 | Status: SHIPPED | OUTPATIENT
Start: 2019-07-19 | End: 2019-09-30 | Stop reason: SDUPTHER

## 2019-07-24 ENCOUNTER — LAB VISIT (OUTPATIENT)
Dept: LAB | Facility: HOSPITAL | Age: 84
End: 2019-07-24
Attending: INTERNAL MEDICINE
Payer: MEDICARE

## 2019-07-24 DIAGNOSIS — N18.30 STAGE 3 CHRONIC KIDNEY DISEASE: ICD-10-CM

## 2019-07-24 LAB
ALBUMIN SERPL BCP-MCNC: 3.6 G/DL (ref 3.5–5.2)
ANION GAP SERPL CALC-SCNC: 8 MMOL/L (ref 8–16)
BUN SERPL-MCNC: 31 MG/DL (ref 8–23)
CALCIUM SERPL-MCNC: 9.4 MG/DL (ref 8.7–10.5)
CHLORIDE SERPL-SCNC: 106 MMOL/L (ref 95–110)
CO2 SERPL-SCNC: 25 MMOL/L (ref 23–29)
CREAT SERPL-MCNC: 2 MG/DL (ref 0.5–1.4)
EST. GFR  (AFRICAN AMERICAN): 33.9 ML/MIN/1.73 M^2
EST. GFR  (NON AFRICAN AMERICAN): 29.3 ML/MIN/1.73 M^2
GLUCOSE SERPL-MCNC: 88 MG/DL (ref 70–110)
PHOSPHATE SERPL-MCNC: 3.5 MG/DL (ref 2.7–4.5)
POTASSIUM SERPL-SCNC: 4.4 MMOL/L (ref 3.5–5.1)
PTH-INTACT SERPL-MCNC: 45 PG/ML (ref 9–77)
SODIUM SERPL-SCNC: 139 MMOL/L (ref 136–145)

## 2019-07-24 PROCEDURE — 36415 COLL VENOUS BLD VENIPUNCTURE: CPT

## 2019-07-24 PROCEDURE — 83970 ASSAY OF PARATHORMONE: CPT

## 2019-07-24 PROCEDURE — 80069 RENAL FUNCTION PANEL: CPT

## 2019-07-29 ENCOUNTER — TELEPHONE (OUTPATIENT)
Dept: NEPHROLOGY | Facility: CLINIC | Age: 84
End: 2019-07-29

## 2019-07-29 NOTE — TELEPHONE ENCOUNTER
----- Message from Ashley Castillo MD sent at 7/29/2019  3:48 PM CDT -----  Contact: Wife : Jayla  tel: [phone]   Kidney filtration is close to his baseline from last year. Potassium levels are stable. Urine test does not show any protein or blood. So overall around the same level of kidney function like last year. The rest of the details can be discussed during clinic visit.    ----- Message -----  From: Manasa Cordova MA  Sent: 7/29/2019   3:18 PM  To: Ashley Castillo MD    Please advise. When you have a chance pt would like the results to his recent labs.  Thanks !  ----- Message -----  From: Osiris Mooney  Sent: 7/29/2019   3:15 PM  To: Anna Ramirez Staff    Test Results    Type of Test:  Lab   Date of Test:   7/24   Communication Preference:  Phone   Additional Information:  pls call w/ results.

## 2019-09-10 ENCOUNTER — OFFICE VISIT (OUTPATIENT)
Dept: NEPHROLOGY | Facility: CLINIC | Age: 84
End: 2019-09-10
Payer: MEDICARE

## 2019-09-10 VITALS
SYSTOLIC BLOOD PRESSURE: 120 MMHG | HEART RATE: 65 BPM | BODY MASS INDEX: 32.43 KG/M2 | WEIGHT: 183 LBS | HEIGHT: 63 IN | DIASTOLIC BLOOD PRESSURE: 60 MMHG | OXYGEN SATURATION: 97 %

## 2019-09-10 DIAGNOSIS — D89.2 PARAPROTEINEMIA: ICD-10-CM

## 2019-09-10 DIAGNOSIS — N18.30 CKD (CHRONIC KIDNEY DISEASE) STAGE 3, GFR 30-59 ML/MIN: Primary | ICD-10-CM

## 2019-09-10 DIAGNOSIS — I12.9 HYPERTENSIVE KIDNEY DISEASE WITH STAGE 3 CHRONIC KIDNEY DISEASE: ICD-10-CM

## 2019-09-10 DIAGNOSIS — N18.30 HYPERTENSIVE KIDNEY DISEASE WITH STAGE 3 CHRONIC KIDNEY DISEASE: ICD-10-CM

## 2019-09-10 PROCEDURE — 1101F PR PT FALLS ASSESS DOC 0-1 FALLS W/OUT INJ PAST YR: ICD-10-PCS | Mod: CPTII,S$GLB,, | Performed by: INTERNAL MEDICINE

## 2019-09-10 PROCEDURE — 99499 RISK ADDL DX/OHS AUDIT: ICD-10-PCS | Mod: S$GLB,,, | Performed by: INTERNAL MEDICINE

## 2019-09-10 PROCEDURE — 1101F PT FALLS ASSESS-DOCD LE1/YR: CPT | Mod: CPTII,S$GLB,, | Performed by: INTERNAL MEDICINE

## 2019-09-10 PROCEDURE — 99999 PR PBB SHADOW E&M-EST. PATIENT-LVL III: ICD-10-PCS | Mod: PBBFAC,,, | Performed by: INTERNAL MEDICINE

## 2019-09-10 PROCEDURE — 99214 OFFICE O/P EST MOD 30 MIN: CPT | Mod: S$GLB,,, | Performed by: INTERNAL MEDICINE

## 2019-09-10 PROCEDURE — 99499 UNLISTED E&M SERVICE: CPT | Mod: S$GLB,,, | Performed by: INTERNAL MEDICINE

## 2019-09-10 PROCEDURE — 99214 PR OFFICE/OUTPT VISIT, EST, LEVL IV, 30-39 MIN: ICD-10-PCS | Mod: S$GLB,,, | Performed by: INTERNAL MEDICINE

## 2019-09-10 PROCEDURE — 99999 PR PBB SHADOW E&M-EST. PATIENT-LVL III: CPT | Mod: PBBFAC,,, | Performed by: INTERNAL MEDICINE

## 2019-09-10 NOTE — PROGRESS NOTES
Subjective:   Patient ID: Viktor Townsend Sr. is a 86 y.o. Black or  male who presents for follow up evaluation of Chronic Kidney Disease    HPI   was seen in clinic today for evaluation of CKD. He was seen in new patient evaluation in 9/2018.    He reports being aware of having hypertension for several years, more than 20. He admits to use of various NSAID for pain control as he has DJD. He has hyperlipidemia, gout, h/o prostate cancer, diverticulosis and other medical problems.    Serial labs reviewed and discussed with him. He has onset of CKD III over last few years. A few outside lab records (from [facility]) show his creatinine was in the range of 2 in 2017 and earlier. He denies any family h/o kidney disease. He does not have diabetes.    He denies any recent acute illness. He denies decreased urine/ cloudy urine/ dysuria/ leg swelling/ flank pain/ SOB/ CP. He has nocturia. He has been on lisinopril-HCTZ based regimen and does take dose in the morning. He admits he does not drink enough fluids and admits to fairly high amount of salt intake. He does not check BP at home. Prior office visits noted for sub optimally controlled BP when he especially established care with [facility]. Pt does not have any symptoms to suggest uremia. He is not too sure of which Ca supplements and vitamins/ minerals he has been taking. He was reminded to bring his medicine bottles for the next visit so that this could be verified.    He has slow progression of CKD to IIIB. Due to findings of paraprotein on work up he was referred to hematology. Per heme he does not need follow up in heme clinic.    Renal Function:  Lab Results   Component Value Date    GLU 88 07/24/2019     09/19/2018     07/24/2019     09/19/2018    K 4.4 07/24/2019    K 4.3 09/19/2018     07/24/2019     09/19/2018    CO2 25 07/24/2019    CO2 23 09/19/2018    BUN 31 (H) 07/24/2019    BUN 31 (H) 09/19/2018     CALCIUM 9.4 07/24/2019    CALCIUM 9.5 09/19/2018    CREATININE 2.0 (H) 07/24/2019    CREATININE 2.2 (H) 09/19/2018    ALBUMIN 3.6 07/24/2019    ALBUMIN 3.6 09/19/2018    PHOS 3.5 07/24/2019    PHOS 3.3 09/19/2018    ESTGFRAFRICA 33.9 (A) 07/24/2019    ESTGFRAFRICA 30 (A) 09/19/2018    EGFRNONAA 29.3 (A) 07/24/2019    EGFRNONAA 26 (A) 09/19/2018       Urinalysis:  Lab Results   Component Value Date    APPEARANCEUA Clear 07/24/2019    PHUR 5.0 07/24/2019    SPECGRAV 1.015 07/24/2019    PROTEINUA Negative 07/24/2019    GLUCUA Negative 07/24/2019    OCCULTUA Negative 07/24/2019    NITRITE Negative 07/24/2019    LEUKOCYTESUR Negative 07/24/2019       Protein/Creatinine Ratio:  Lab Results   Component Value Date    PROTEINURINE 9 07/24/2019    CREATRANDUR 182.0 07/24/2019    UTPCR 0.05 07/24/2019       CBC:  Lab Results   Component Value Date    WBC 8.68 09/26/2018    HGB 11.9 (L) 09/26/2018    HCT 37.8 (L) 09/26/2018       PTH:  Lab Results   Component Value Date    PTH 45.0 07/24/2019     Hep C/B screening negative  Paraprotein detected on SPE/ [facility]    US kidneys 2/1017  Changes of CKD  Cortical thinning  No mass/ hydronephrosis    Review of Systems   Constitutional: Negative for activity change, appetite change, chills, fatigue and fever.   HENT: Negative for facial swelling and sore throat.    Respiratory: Negative for cough, shortness of breath and wheezing.    Cardiovascular: Negative for chest pain and leg swelling.   Gastrointestinal: Negative for abdominal pain, diarrhea, nausea and vomiting.   Endocrine: Negative for polydipsia and polyuria.   Genitourinary: Negative for dysuria, flank pain, frequency and hematuria.   Musculoskeletal: Positive for arthralgias and back pain. Negative for myalgias.   Neurological: Negative for dizziness, light-headedness and headaches.   Psychiatric/Behavioral: The patient is not nervous/anxious.        Objective:   Physical Exam   Constitutional: He is oriented to person,  place, and time. He appears well-developed and well-nourished. No distress.   HENT:   Mouth/Throat: Oropharynx is clear and moist.   Eyes: Right eye exhibits no discharge. Left eye exhibits no discharge.   Neck: Neck supple.   Cardiovascular: Normal rate, regular rhythm and normal heart sounds.   Pulmonary/Chest: Effort normal and breath sounds normal. No respiratory distress. He has no wheezes. He has no rales.   Abdominal: Soft. There is no tenderness.   Musculoskeletal: He exhibits no edema.   Right should movements restricted    Neurological: He is alert and oriented to person, place, and time.   Skin: Skin is warm and dry. He is not diaphoretic.   Psychiatric: He has a normal mood and affect.   Vitals reviewed.      Assessment:     1. CKD (chronic kidney disease) stage 3, GFR 30-59 ml/min    2. Hypertensive kidney disease with stage 3 chronic kidney disease    3. Paraproteinemia        Plan:       Problem List Items Addressed This Visit        Renal/    CKD (chronic kidney disease) stage 3, GFR 30-59 ml/min - Primary    Relevant Orders    PTH, intact    Renal function panel    Uric acid    Vitamin D    Urinalysis    Protein / creatinine ratio, urine    Hypertensive kidney disease with stage 3 chronic kidney disease    Relevant Orders    PTH, intact    Renal function panel    Uric acid    Vitamin D    Urinalysis    Protein / creatinine ratio, urine       Immunology/Multi System    Paraproteinemia        Mr. Townsend has CKD IIIB due to longstanding hypertension, heavy use of NSAID over number of years. I had a detailed discussion with patient about his CKD diagnosis, CKD staging, interpretation of serial labs pertaining to his kidneys, potential risk of progression of CKD. Possible etiology of his CKD, strict low salt diet, avoiding any NSAID, having periodic monitoring of renal labs to assess and treat any electrolyte disturbance, acid base disorder, to follow progress of CKD with creatinine and eGFR. I  stressed to have BP monitoring at home and to bring readings for review with his future visits with MD. Also d/w him need for changed to his medications as CKD progresses and as detailed below. kidney US from 2017 shows changes related to CKD and somewhat smaller kidney size.      - periodically monitor renal panel for electrolytes, acid base status, eGFR  - CKD staging, diagnosis, onset of CKD, potential risk of CKD progression, potential risk of KATELYN due to volume depletion/ SILVESTRE/ ATN due to hemodynamic changes d/w patient  - stress to follow low salt diet, keep well hydrated, follow low K diet in case of any dyskalemia, nutritional changes d/w patient   - trend PTH levels, vit D, phos, corrected Ca and treat as necessary   - trend urine studies for proteinuria  - avoid NSAID/ bactrim/ IV contrast/ gadolinium/ aminoglycoside/ fleet enema/ PPI where possible  - continue lisinopril containing regimen for RAAS blockade with close monitoring of potassium levels. Pt encouraged to monitor BP at home, goal BP level d/w patient  - trend H/H periodically if CKD stage progresses, current level of CKD by itself unlikely to explain his chronic anemia, further work up deferred to PCP.  - pt needs to follow with urologist given h/o prostate cancer   - trend uric acid level  - Ca levels borderline high  - may be a good option to transition to another agent from HCTZ if he develops hypercalcemia, hyperuricemia    Plan, labs, recommendations were discussed with patient, his questions were answered to his satisfaction.      RTC 4 months with pre clinic labs

## 2019-09-30 RX ORDER — LISINOPRIL AND HYDROCHLOROTHIAZIDE 20; 25 MG/1; MG/1
TABLET ORAL
Qty: 90 TABLET | Refills: 0 | Status: SHIPPED | OUTPATIENT
Start: 2019-09-30 | End: 2020-01-27 | Stop reason: SDUPTHER

## 2019-10-06 DIAGNOSIS — I10 ESSENTIAL HYPERTENSION: Primary | ICD-10-CM

## 2019-10-07 RX ORDER — PRAVASTATIN SODIUM 20 MG/1
TABLET ORAL
Qty: 90 TABLET | Refills: 3 | Status: SHIPPED | OUTPATIENT
Start: 2019-10-07 | End: 2020-09-29

## 2019-10-07 NOTE — TELEPHONE ENCOUNTER
----- Message from Francia Mclean sent at 10/7/2019  9:38 AM CDT -----  Contact: EVANS EPSTEIN   Can the clinic reply in [facility]: no      Please refill the medication(s) listed below. Please call the patient when the prescription(s) is ready for  at this phone number   [phone]      Medication #1 pravastatin (PRAVACHOL) 20 MG tablet    Medication #2       Preferred Pharmacy: [facility] #[id number] [address]

## 2019-12-02 ENCOUNTER — PATIENT OUTREACH (OUTPATIENT)
Dept: ADMINISTRATIVE | Facility: OTHER | Age: 84
End: 2019-12-02

## 2019-12-02 NOTE — PROGRESS NOTES
Spoke to wife, states did  refill in November. If she doesn't have any refills, I gave her my # to call me back.

## 2020-01-27 RX ORDER — LISINOPRIL AND HYDROCHLOROTHIAZIDE 20; 25 MG/1; MG/1
1 TABLET ORAL DAILY
Qty: 90 TABLET | Refills: 0 | Status: SHIPPED | OUTPATIENT
Start: 2020-01-27 | End: 2020-04-23 | Stop reason: SDUPTHER

## 2020-03-17 RX ORDER — ALLOPURINOL 300 MG/1
TABLET ORAL
Qty: 90 TABLET | Refills: 3 | Status: SHIPPED | OUTPATIENT
Start: 2020-03-17 | End: 2020-07-07 | Stop reason: SDUPTHER

## 2020-04-23 RX ORDER — LISINOPRIL AND HYDROCHLOROTHIAZIDE 20; 25 MG/1; MG/1
1 TABLET ORAL DAILY
Qty: 90 TABLET | Refills: 0 | Status: SHIPPED | OUTPATIENT
Start: 2020-04-23 | End: 2020-07-15 | Stop reason: SDUPTHER

## 2020-05-11 ENCOUNTER — TELEPHONE (OUTPATIENT)
Dept: INTERNAL MEDICINE | Facility: CLINIC | Age: 85
End: 2020-05-11

## 2020-05-11 NOTE — TELEPHONE ENCOUNTER
----- Message from Miriam Horvath sent at 5/11/2020 11:39 AM CDT -----  Contact: Jayla (spouse)  Name of Who is Calling: Jayla (spouse)    What is the request in detail: Would like to inform provider that the patient fell on Saturday, he missed a step. He hurt his side, face and back and has been taking Tylenol. The patient is still in pain. Please contact to further discuss and advise      Can the clinic reply by [facility]: no    What [facility] Back [facility]: [phone]

## 2020-05-11 NOTE — TELEPHONE ENCOUNTER
Pt states he fell face first, he has a deep cut  on nose and a little further up, he has burning sensation in hands and a knot on one of his fingers, he states his main concern is he has a very hard time getting up. He states it has been since Friday and was wondering if you think he may have a cracked rib, he is very concerned about being exposed, but will go to ER for workup if recommended by you

## 2020-05-12 ENCOUNTER — HOSPITAL ENCOUNTER (OUTPATIENT)
Dept: RADIOLOGY | Facility: HOSPITAL | Age: 85
Discharge: HOME | End: 2020-05-12
Attending: EMERGENCY MEDICINE
Payer: MEDICARE

## 2020-05-12 ENCOUNTER — DOCUMENTATION ONLY (OUTPATIENT)
Dept: URGENT CARE | Facility: CLINIC | Age: 85
End: 2020-05-12

## 2020-05-12 ENCOUNTER — OFFICE VISIT (OUTPATIENT)
Dept: URGENT CARE | Facility: CLINIC | Age: 85
End: 2020-05-12
Payer: MEDICARE

## 2020-05-12 VITALS
SYSTOLIC BLOOD PRESSURE: 118 MMHG | OXYGEN SATURATION: 99 % | WEIGHT: 183 LBS | HEART RATE: 85 BPM | BODY MASS INDEX: 32.43 KG/M2 | HEIGHT: 63 IN | DIASTOLIC BLOOD PRESSURE: 72 MMHG | TEMPERATURE: 98 F
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
## 2020-05-12 DIAGNOSIS — S09.93XA BLUNT TRAUMA OF FACE, INITIAL ENCOUNTER: ICD-10-CM

## 2020-05-12 PROCEDURE — 70486 CT MAXILLOFACIAL W/O DYE: CPT | Mod: 26,,, | Performed by: RADIOLOGY

## 2020-05-12 PROCEDURE — 73130 XR HAND COMPLETE 3 VIEW RIGHT: ICD-10-PCS | Mod: RT,S$GLB,, | Performed by: RADIOLOGY

## 2020-05-12 PROCEDURE — 73130 X-RAY EXAM OF HAND: CPT | Mod: LT,S$GLB,, | Performed by: RADIOLOGY

## 2020-05-12 PROCEDURE — 71100 XR RIBS 2 VIEW LEFT: ICD-10-PCS | Mod: LT,S$GLB,, | Performed by: RADIOLOGY

## 2020-05-12 PROCEDURE — 99214 PR OFFICE/OUTPT VISIT, EST, LEVL IV, 30-39 MIN: ICD-10-PCS | Mod: S$GLB,,, | Performed by: EMERGENCY MEDICINE

## 2020-05-12 PROCEDURE — 71046 XR CHEST PA AND LATERAL: ICD-10-PCS | Mod: S$GLB,,, | Performed by: RADIOLOGY

## 2020-05-12 PROCEDURE — 71100 X-RAY EXAM RIBS UNI 2 VIEWS: CPT | Mod: LT,S$GLB,, | Performed by: RADIOLOGY

## 2020-05-12 PROCEDURE — 73110 XR WRIST COMPLETE 3 VIEWS LEFT: ICD-10-PCS | Mod: LT,S$GLB,, | Performed by: RADIOLOGY

## 2020-05-12 PROCEDURE — 73130 X-RAY EXAM OF HAND: CPT | Mod: RT,S$GLB,, | Performed by: RADIOLOGY

## 2020-05-12 PROCEDURE — 70486 CT MAXILLOFACIAL WITHOUT CONTRAST: ICD-10-PCS | Mod: 26,,, | Performed by: RADIOLOGY

## 2020-05-12 PROCEDURE — 73110 X-RAY EXAM OF WRIST: CPT | Mod: LT,S$GLB,, | Performed by: RADIOLOGY

## 2020-05-12 PROCEDURE — 99214 OFFICE O/P EST MOD 30 MIN: CPT | Mod: S$GLB,,, | Performed by: EMERGENCY MEDICINE

## 2020-05-12 PROCEDURE — 70486 CT MAXILLOFACIAL W/O DYE: CPT | Mod: TC

## 2020-05-12 PROCEDURE — 71046 X-RAY EXAM CHEST 2 VIEWS: CPT | Mod: S$GLB,,, | Performed by: RADIOLOGY

## 2020-05-12 NOTE — PROGRESS NOTES
"Patient called and stated he stated when he returned to [place] to have head CT done and patient states he was told "they are closed".     I spoke with Dr. Oshea and she wants the patient to go to any [facility] ED and she will put in a referral. I notified the patient of Dr. Oshea's instruction and he verbalized understanding.    "

## 2020-05-12 NOTE — PROGRESS NOTES
Patient came for  visit due to having a fall. Dr. Oshea ordered a Maxfacial CT and head CT. Only Maxfacial was done. I called and spoke with [facility], Lyudmila just said the Head CT was not done. He was sent back over to [facility] to have STAT head CT done.     I called patient to notify patient to return for head CT.  Patient verbalized understanding.

## 2020-05-12 NOTE — PATIENT INSTRUCTIONS
You have been evaluated in urgent care for a fall.  We have x-rayed your chest, ribs, hand, wrists and there are no broken bones.  Since you fell on your face you need to have a CT scan of your head and your face today at 3:15 a.m..  One has been scheduled for you at [facility].  I will call you later today with the results.

## 2020-05-12 NOTE — PROGRESS NOTES
[facility] - Visit Note                                           Chief Complaint  87 y.o. male with Fall    History of Present Illness  Viktor Townsend  presents to the urgent care with complaints of fall on Friday.  Patient reports that he tripped on some construction turf in his yard.  He landed on his face and both hands.  Patient believes that he lost consciousness.  His wife found him in the yard.  Patient denies chest pain or shortness of breath prior to the incident.    Past Medical History:   Diagnosis Date    CKD (chronic kidney disease)     Colon polyps     Gout     Hiatal hernia     Hypertension     Lumbar back pain     Personal history of asbestosis      Past Surgical History:   Procedure Laterality Date    CHOLECYSTECTOMY      EYE SURGERY      PROSTATE SURGERY        Review of patient's allergies indicates:  No Known Allergies     Review of Systems and Physical Exam     Review of Systems  -- Constitution - no fever, no weight loss, no loss of consciousness reports fall with facial trauma  -- Eyes - no changes in vision, no redness, no swelling, no discharge  -- Ear, Nose - no  earache, no loss of hearing, no epistaxis  -- Mouth,Throat - no sore throat, no toothache, normal voice, normal swallowing  -- Respiratory - denies cough and congestion, no shortness of breath, no wheezing, no increased WOB   -- Cardiovascular - denies chest pain, no palpitations, no lower extremity edema reports left-sided rib pain  -- Gastrointestinal - denies abdominal pain, denies nausea, vomiting, and diarrhea  -- Genitourinary - no dysuria, denies flank pain, no hematuria or frequency   -- Musculoskeletal - denies back pain, negative for myalgias and arthralgias reports bilateral hand and wrist pain  -- Neurological - no headache, no neurologic changes, no loss of bladder or bowel function no seizure like activity, no changes in hearing or vision  -- Skin - denies skin changes, no rash, no hives, no  "suspected skin infection    Vital Signs   height is 5' 3" (1.6 m) and weight is 83 kg (182 lb 15.7 oz).      Physical Exam  -- Nursing note and vitals reviewed -   -- Constitutional:  Awake alert and oriented, GCS 15, no acute distress.  Appears well.  -- Head:  Patient has multiple well-healing abrasions and lacerations over his forehead, bilateral brows and nose.  He is diffusely tender.  No evidence of infection  -- Eyes: Pupils are equal and reactive to light. Extraocular movements intact. No nystagmus.  No periorbital swelling. Normal conjunctiva.  -- Nose:  Patient has a large abrasion over the bridge of his nose without obvious deformity.  -- Throat: Mucous membranes moist, pharynx normal, normal tonsils.  Airway patent.  -- Ears: External ears and TM normal bilaterally. Normal hearing.   -- Neck: Normal range of motion. Neck supple. No meningismus. No adenopathy  -- Cardiac: Normal rate, regular rhythm and normal heart sounds. No carotid bruit. No lower extremity edema.  Patient has mild tenderness to palpation over left lower ribs at the mid axillary line  -- Pulmonary: Normal respiratory effort, breath sounds equal bilaterally. Adequate flow.  No wheezing.  No crackles. No increased work of breathing  -- Abdominal: Soft, no tenderness, no guarding, no rebound. Normal bowel sounds.   -- Musculoskeletal: Normal range of motion, all 4 extremities 5/5 strength.  Neurovascularly intact. Atraumatic. No deformities.  Patient is able to ambulate.  He reports tenderness to palpation diffusely on both hands as well as left wrist.  Hands and wrists are neurovascularly intact without deformity  -- Neurological:  Cranial nerves 2-12 grossly intact. No focal deficits.   -- Vascular: Posterior tibial, dorsalis pedis and radial pulses 2+ bilaterally    -- Lymphatics: No cervical or peripheral lymphadenopathy.   -- Skin: Warm and dry. No evidence of rash or cellulitis  -- Psychiatric: Normal mood and affect. Bedside " behavior is appropriate.  Patient is cooperative.  Denies suicidal homicidal ideation.    Emergency Room Course     Treatment Course, Evaluation, and Medical Decision Makin.  Physical exam significant for multiple abrasions and tender areas, see above  2.  X-ray bilateral hands, left wrist, left ribs, chest x-ray negative for acute process  3.  Patient is up-to-date on his tetanus shot he reports that he had one  last year  4.  Patient sent to [facility] for CT head and maxillofacial for 315 today.  Patient has a vehicle and his wife will drive him there.  5.  Ace wrap applied to right hand for slight swelling      Diagnosis  -- The encounter diagnosis was Fall, initial encounter.    Disposition and Plan  -- Disposition: home  -- Condition: stable  -- Follow-up: Patient to follow up with Kalia Jones MD in 1-2 days, and any specialists noted on discharge paperwork  -- I advised the patient that we have found no life threatening condition today and have provided recommendations his/her care  -- At this time, I believe the patient is clinically stable for discharge.   -- The patient acknowledges that ongoing follow up with a MD is required   -- Patient agrees to comply with all instruction and direction given in the urgent care  -- Patient counseled on strict return precautions as discussed

## 2020-05-13 ENCOUNTER — TELEPHONE (OUTPATIENT)
Dept: INTERNAL MEDICINE | Facility: CLINIC | Age: 85
End: 2020-05-13

## 2020-05-13 NOTE — TELEPHONE ENCOUNTER
----- Message from Anna Shirley sent at 5/13/2020 10:25 AM CDT -----  Contact: Lou allison- wife  Name of Who is Calling: Lou allison- wife      What is the request in detail: Rocío allison would like to speak with staff in regards to clearing up a miscommunication. Please contact to further discuss and advise.    Can the clinic reply by [facility]: n      What [facility] Back [facility]:[phone]

## 2020-05-13 NOTE — TELEPHONE ENCOUNTER
Patient wife called stating the patient had a CT scan on yesterday done at  and calling to see if you can look at it to give them the results

## 2020-05-13 NOTE — TELEPHONE ENCOUNTER
----- Message from Julián Knight, Patient Care Assistant sent at 5/13/2020  3:59 PM CDT -----  Contact: Spouse  Type:  Patient Returning Call    Who Called: Spouse    Who Left Message for Patient: Shayy Gipson    Does the patient know what this is regarding?:Yes    Best Call Back Number:[phone]    Additional Information:   None

## 2020-05-14 ENCOUNTER — TELEPHONE (OUTPATIENT)
Dept: INTERNAL MEDICINE | Facility: CLINIC | Age: 85
End: 2020-05-14

## 2020-05-14 NOTE — TELEPHONE ENCOUNTER
Patient wife called stating the patient had a CT scan on Tuesday done at  and calling to see if you can look at it to give them the results

## 2020-05-14 NOTE — TELEPHONE ENCOUNTER
----- Message from Justin Giraldo sent at 5/14/2020  3:08 PM CDT -----  Contact: pt wife  Name of Who is Calling: pt wife    What is the request in detail: is returning a call. Pt wife states to please call number listed at bottom. Please contact to further discuss and advise      Can the clinic reply by [facility]: no    What Number to Call Back if not in [facility]: [phone]

## 2020-05-17 ENCOUNTER — TELEPHONE (OUTPATIENT)
Dept: URGENT CARE | Facility: CLINIC | Age: 85
End: 2020-05-17

## 2020-06-03 ENCOUNTER — OFFICE VISIT (OUTPATIENT)
Dept: INTERNAL MEDICINE | Facility: CLINIC | Age: 85
End: 2020-06-03
Attending: INTERNAL MEDICINE
Payer: MEDICARE

## 2020-06-03 ENCOUNTER — TELEPHONE (OUTPATIENT)
Dept: INTERNAL MEDICINE | Facility: CLINIC | Age: 85
End: 2020-06-03

## 2020-06-03 VITALS
HEART RATE: 69 BPM | WEIGHT: 183 LBS | BODY MASS INDEX: 32.43 KG/M2 | HEIGHT: 63 IN | SYSTOLIC BLOOD PRESSURE: 151 MMHG | DIASTOLIC BLOOD PRESSURE: 71 MMHG | OXYGEN SATURATION: 99 %

## 2020-06-03 DIAGNOSIS — N18.30 CKD (CHRONIC KIDNEY DISEASE) STAGE 3, GFR 30-59 ML/MIN: ICD-10-CM

## 2020-06-03 DIAGNOSIS — B02.9 HERPES ZOSTER WITHOUT COMPLICATION: Primary | ICD-10-CM

## 2020-06-03 DIAGNOSIS — I10 ESSENTIAL HYPERTENSION: ICD-10-CM

## 2020-06-03 DIAGNOSIS — R07.89 CHEST WALL PAIN: ICD-10-CM

## 2020-06-03 PROCEDURE — 1101F PT FALLS ASSESS-DOCD LE1/YR: CPT | Mod: CPTII,S$GLB,, | Performed by: INTERNAL MEDICINE

## 2020-06-03 PROCEDURE — 1125F PR PAIN SEVERITY QUANTIFIED, PAIN PRESENT: ICD-10-PCS | Mod: S$GLB,,, | Performed by: INTERNAL MEDICINE

## 2020-06-03 PROCEDURE — 1125F AMNT PAIN NOTED PAIN PRSNT: CPT | Mod: S$GLB,,, | Performed by: INTERNAL MEDICINE

## 2020-06-03 PROCEDURE — 99999 PR PBB SHADOW E&M-EST. PATIENT-LVL III: ICD-10-PCS | Mod: PBBFAC,,, | Performed by: INTERNAL MEDICINE

## 2020-06-03 PROCEDURE — 1159F PR MEDICATION LIST DOCUMENTED IN MEDICAL RECORD: ICD-10-PCS | Mod: S$GLB,,, | Performed by: INTERNAL MEDICINE

## 2020-06-03 PROCEDURE — 99213 PR OFFICE/OUTPT VISIT, EST, LEVL III, 20-29 MIN: ICD-10-PCS | Mod: S$GLB,,, | Performed by: INTERNAL MEDICINE

## 2020-06-03 PROCEDURE — 99499 UNLISTED E&M SERVICE: CPT | Mod: S$GLB,,, | Performed by: INTERNAL MEDICINE

## 2020-06-03 PROCEDURE — 99213 OFFICE O/P EST LOW 20 MIN: CPT | Mod: S$GLB,,, | Performed by: INTERNAL MEDICINE

## 2020-06-03 PROCEDURE — 1101F PR PT FALLS ASSESS DOC 0-1 FALLS W/OUT INJ PAST YR: ICD-10-PCS | Mod: CPTII,S$GLB,, | Performed by: INTERNAL MEDICINE

## 2020-06-03 PROCEDURE — 1159F MED LIST DOCD IN RCRD: CPT | Mod: S$GLB,,, | Performed by: INTERNAL MEDICINE

## 2020-06-03 PROCEDURE — 99499 RISK ADDL DX/OHS AUDIT: ICD-10-PCS | Mod: S$GLB,,, | Performed by: INTERNAL MEDICINE

## 2020-06-03 PROCEDURE — 99999 PR PBB SHADOW E&M-EST. PATIENT-LVL III: CPT | Mod: PBBFAC,,, | Performed by: INTERNAL MEDICINE

## 2020-06-03 RX ORDER — DICLOFENAC SODIUM 10 MG/G
2 GEL TOPICAL DAILY
Qty: 100 G | Refills: 0 | Status: SHIPPED | OUTPATIENT
Start: 2020-06-03 | End: 2021-12-09

## 2020-06-03 NOTE — PROGRESS NOTES
"Subjective:       Patient ID: Viktor Townsend Sr. is a 87 y.o. male.    Chief Complaint: Back Pain and Fall    Here for urgent visit    [birth year], he was walking in his backyard when he had a mechanical trip and fall landing on his right shoulder a and basis.  He was seen a in ED and had a CT maxillofacial that was normal and a cruise without fractures.  He reports overall facial pains and hand pains are improving but about 2 weeks after his fall he developed a different right-sided chest pain after being out in the garden.  Pain was constant, radiating around front of chest, accompanied by rash.  No fevers or chills.  This pain is improving.      Review of Systems   Constitutional: Negative for appetite change, chills, fever and unexpected weight change.   HENT: Negative for hearing loss, sore throat and trouble swallowing.    Eyes: Negative for visual disturbance.   Respiratory: Negative for cough, chest tightness and shortness of breath.    Cardiovascular: Negative for chest pain and leg swelling.   Gastrointestinal: Negative for abdominal pain, blood in stool, constipation, diarrhea, nausea and vomiting.   Endocrine: Negative for polydipsia and polyuria.   Genitourinary: Negative for decreased urine volume, difficulty urinating, dysuria, frequency and urgency.   Musculoskeletal: Negative for gait problem.   Skin: Negative for rash.   Neurological: Negative for dizziness and numbness.   Psychiatric/Behavioral: The patient is not nervous/anxious.        Objective:      Vitals:    06/03/20 1426   BP: (!) 151/71   Pulse: 69   SpO2: 99%   Weight: 83 kg (182 lb 15.7 oz)   Height: 5' 3" (1.6 m)      Physical Exam   Skin:            Assessment:       1. Herpes zoster without complication    2. Chest wall pain    3. CKD (chronic kidney disease) stage 3, GFR 30-59 ml/min    4. Essential hypertension        Plan:       Viktor was seen today for back pain and fall.    Diagnoses and all orders for this " visit:    Herpes zoster without complication   Classic dermatomal distribution. Pain improved and resolved. Topical voltaren, tylenol, ice/heat    Chest wall    CKD (chronic kidney disease) stage 3, GFR 30-59 ml/min     Essential hypertension   Above goal. Pt in pain today. He is due to f/u with his PCP for annual. Will defer to his expertise. Office and Emergency Department prompts discussed.           Pepe Rose MD  Internal Medicine-[facility]        Side effects of medication(s) were discussed in detail and patient voiced understanding.  Patient will call back for any issues or complications.

## 2020-06-03 NOTE — TELEPHONE ENCOUNTER
----- Message from Diamond Islas sent at 6/3/2020 10:40 AM CDT -----  Contact: Jayla Townsend (Spouse)  Type: Patient Call Back    Who called: Jayla Kristian (Spouse)    What is the request in detail:  Jayla Townsend (Spouse) is requesting a call back. She states that patient had a fall and she would like to get something for his pain. He went to urgent care and was advised that nothing is broken. She states that patient body is sore.   Please advise.    Can the clinic reply by [facility]? No    Best call back number: [phone]    Additional Information: N/A

## 2020-07-07 RX ORDER — ALLOPURINOL 300 MG/1
TABLET ORAL
Qty: 90 TABLET | Refills: 3 | Status: SHIPPED | OUTPATIENT
Start: 2020-07-07 | End: 2021-04-01 | Stop reason: SDUPTHER

## 2020-07-07 NOTE — TELEPHONE ENCOUNTER
----- Message from Francia Escobar sent at 7/7/2020  3:47 PM CDT -----  Regarding: refill  Can the clinic reply in [facility]: no      Please refill the medication(s) listed below. Please call the patient when the prescription(s) is ready for  at this phone number   [phone]      Medication #1 allopurinoL (ZYLOPRIM) 300 MG tablet    Medication #2       Preferred Pharmacy: [facility] #[id number] [address]

## 2020-07-15 DIAGNOSIS — I10 ESSENTIAL HYPERTENSION: Primary | ICD-10-CM

## 2020-07-15 RX ORDER — LISINOPRIL AND HYDROCHLOROTHIAZIDE 20; 25 MG/1; MG/1
1 TABLET ORAL DAILY
Qty: 90 TABLET | Refills: 0 | Status: SHIPPED | OUTPATIENT
Start: 2020-07-15 | End: 2020-09-29

## 2020-07-15 NOTE — TELEPHONE ENCOUNTER
----- Message from Rosita Alva sent at 7/15/2020  1:30 PM CDT -----  Regarding: refill request  Type:  Pharmacy Calling to Clarify an RX    Name of Caller: Castillo     Pharm[facility] Name: [facility]     Prescription Name: lisinopriL-hydrochlorothiazide (PRINZIDE,ZESTORETIC) 20-25 mg Tab    What do they need to clarify? Castillo from [facility] called to get a refill for the patient for:lisinopriL-hydrochlorothiazide (PRINZIDE,ZESTORETIC) 20-25 mg Tab    Can you be contacted via [facility]?no    Best Call Back Number: [phone]

## 2020-09-29 ENCOUNTER — OFFICE VISIT (OUTPATIENT)
Dept: INTERNAL MEDICINE | Facility: CLINIC | Age: 85
End: 2020-09-29
Attending: FAMILY MEDICINE
Payer: MEDICARE

## 2020-09-29 VITALS
DIASTOLIC BLOOD PRESSURE: 52 MMHG | OXYGEN SATURATION: 98 % | HEART RATE: 87 BPM | WEIGHT: 176.13 LBS | SYSTOLIC BLOOD PRESSURE: 106 MMHG | BODY MASS INDEX: 31.21 KG/M2 | HEIGHT: 63 IN

## 2020-09-29 DIAGNOSIS — I12.9 HYPERTENSIVE KIDNEY DISEASE WITH STAGE 3 CHRONIC KIDNEY DISEASE: ICD-10-CM

## 2020-09-29 DIAGNOSIS — N18.30 HYPERTENSIVE KIDNEY DISEASE WITH STAGE 3 CHRONIC KIDNEY DISEASE: ICD-10-CM

## 2020-09-29 DIAGNOSIS — I10 ESSENTIAL HYPERTENSION: ICD-10-CM

## 2020-09-29 DIAGNOSIS — Z00.00 ENCOUNTER FOR PREVENTIVE HEALTH EXAMINATION: Primary | ICD-10-CM

## 2020-09-29 DIAGNOSIS — R79.9 ABNORMAL FINDING OF BLOOD CHEMISTRY, UNSPECIFIED: ICD-10-CM

## 2020-09-29 DIAGNOSIS — N18.30 CKD (CHRONIC KIDNEY DISEASE) STAGE 3, GFR 30-59 ML/MIN: ICD-10-CM

## 2020-09-29 PROCEDURE — 1159F PR MEDICATION LIST DOCUMENTED IN MEDICAL RECORD: ICD-10-PCS | Mod: S$GLB,,, | Performed by: FAMILY MEDICINE

## 2020-09-29 PROCEDURE — 1125F AMNT PAIN NOTED PAIN PRSNT: CPT | Mod: S$GLB,,, | Performed by: FAMILY MEDICINE

## 2020-09-29 PROCEDURE — 1101F PT FALLS ASSESS-DOCD LE1/YR: CPT | Mod: CPTII,S$GLB,, | Performed by: FAMILY MEDICINE

## 2020-09-29 PROCEDURE — 1159F MED LIST DOCD IN RCRD: CPT | Mod: S$GLB,,, | Performed by: FAMILY MEDICINE

## 2020-09-29 PROCEDURE — 1101F PR PT FALLS ASSESS DOC 0-1 FALLS W/OUT INJ PAST YR: ICD-10-PCS | Mod: CPTII,S$GLB,, | Performed by: FAMILY MEDICINE

## 2020-09-29 PROCEDURE — 99214 OFFICE O/P EST MOD 30 MIN: CPT | Mod: S$GLB,,, | Performed by: FAMILY MEDICINE

## 2020-09-29 PROCEDURE — 99499 UNLISTED E&M SERVICE: CPT | Mod: S$GLB,,, | Performed by: FAMILY MEDICINE

## 2020-09-29 PROCEDURE — 99214 PR OFFICE/OUTPT VISIT, EST, LEVL IV, 30-39 MIN: ICD-10-PCS | Mod: S$GLB,,, | Performed by: FAMILY MEDICINE

## 2020-09-29 PROCEDURE — 99499 RISK ADDL DX/OHS AUDIT: ICD-10-PCS | Mod: S$GLB,,, | Performed by: FAMILY MEDICINE

## 2020-09-29 PROCEDURE — 99999 PR PBB SHADOW E&M-EST. PATIENT-LVL III: ICD-10-PCS | Mod: PBBFAC,,, | Performed by: FAMILY MEDICINE

## 2020-09-29 PROCEDURE — 99999 PR PBB SHADOW E&M-EST. PATIENT-LVL III: CPT | Mod: PBBFAC,,, | Performed by: FAMILY MEDICINE

## 2020-09-29 PROCEDURE — 1125F PR PAIN SEVERITY QUANTIFIED, PAIN PRESENT: ICD-10-PCS | Mod: S$GLB,,, | Performed by: FAMILY MEDICINE

## 2020-09-29 RX ORDER — LISINOPRIL 20 MG/1
20 TABLET ORAL DAILY
Qty: 30 TABLET | Refills: 11 | Status: SHIPPED | OUTPATIENT
Start: 2020-09-29 | End: 2021-11-01 | Stop reason: SDUPTHER

## 2020-09-29 NOTE — PROGRESS NOTES
"Subjective:       Patient ID: Viktor Townsend Sr. is a 87 y.o. male.     Chief Complaint:  Annual exam     Mr. Townsend is an 88 y/o M with a PMH of gout and HTN who has presented today for his annual exam. He fell several months ago.  Seen in ER and complete w/u was neg.    He has had no recurrent flare-ups in his right hand, his left elbow, and his left great toe since starting allopurinol. He takes allopurinol every day.     Mr. Townsend's blood pressure is well controlled at this itme.      Review of Systems   Constitutional: Negative for activity change, appetite change, chills and fever.   HENT: Negative for congestion, rhinorrhea and sneezing.    Respiratory: Negative for cough, chest tightness, shortness of breath and wheezing.    Cardiovascular: Negative for chest pain, palpitations and leg swelling.   Gastrointestinal: Negative for abdominal pain, blood in stool, diarrhea and vomiting.   Genitourinary: Negative for dysuria, flank pain and frequency.   Musculoskeletal: Positive for arthralgias, joint swelling and myalgias. Negative for neck pain and neck stiffness.   Skin: Negative for color change and pallor.   Neurological: Negative for dizziness, weakness, light-headedness and headaches.   Hematological: Negative for adenopathy. Does not bruise/bleed easily.   Psychiatric/Behavioral: Negative for agitation and behavioral problems.        Objective:    Blood pressure (!) 106/52, pulse 87, height 5' 3" (1.6 m), weight 79.9 kg (176 lb 2.4 oz), SpO2 98 %.       Physical Exam   Constitutional: He is oriented to person, place, and time. He appears well-developed and well-nourished. No distress.   HENT:   Head: Normocephalic and atraumatic.   Eyes: EOM are normal. Pupils are equal, round, and reactive to light.   Neck: Normal range of motion. Neck supple.   Cardiovascular: Normal rate, regular rhythm, normal heart sounds and intact distal pulses.    Pulmonary/Chest: Effort normal and breath sounds normal. No " respiratory distress. He has no wheezes.   Abdominal: Soft. Bowel sounds are normal. He exhibits no distension. There is no tenderness.   Musculoskeletal: He exhibits no edema, tenderness or deformity.   Right shoulder range of motion limited.    Lymphadenopathy:     He has no cervical adenopathy.   Neurological: He is alert and oriented to person, place, and time. No cranial nerve deficit.   Skin: Skin is warm and dry. Capillary refill takes less than 2 seconds. No rash noted. He is not diaphoretic. No erythema. No pallor.   Psychiatric: He has a normal mood and affect. His behavior is normal.       Assessment:       1. Annual  Chronic right shoulder pain    2. Hand edema    3. CKD (chronic kidney disease) stage 3, GFR 30-59 ml/min       hypertension, BP too low today see changes below  Plan:       Mr. Townsend is an 86 y/o M with a PMH of gout and HTN who has presented today for investigation of recurrent episodes of gout as well as chronic right shoulder pain.     1. Gout  - Prescribed colchicine 0.6 mg - 1 tablet 2x per day  - Continue allopurinol 300mg tablet 1x per day     2. Right Shoulder Pain  - Seen by orthopedics   - Continue tylenol PRN     3. HTN  - /52 today  - Stop lisinopril/hydrochlorothiazide 20-25mg   - start lisinopril 20  blood work today

## 2020-09-30 ENCOUNTER — IMMUNIZATION (OUTPATIENT)
Dept: PHARMACY | Facility: CLINIC | Age: 85
End: 2020-09-30
Payer: MEDICARE

## 2020-12-08 ENCOUNTER — OFFICE VISIT (OUTPATIENT)
Dept: INTERNAL MEDICINE | Facility: CLINIC | Age: 85
End: 2020-12-08
Attending: INTERNAL MEDICINE
Payer: MEDICARE

## 2020-12-08 ENCOUNTER — HOSPITAL ENCOUNTER (OUTPATIENT)
Dept: RADIOLOGY | Facility: OTHER | Age: 85
Discharge: HOME | End: 2020-12-08
Attending: INTERNAL MEDICINE
Payer: MEDICARE

## 2020-12-08 VITALS
SYSTOLIC BLOOD PRESSURE: 160 MMHG | DIASTOLIC BLOOD PRESSURE: 78 MMHG | OXYGEN SATURATION: 98 % | HEIGHT: 63 IN | HEART RATE: 80 BPM | BODY MASS INDEX: 31.61 KG/M2 | WEIGHT: 178.38 LBS
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
## 2020-12-08 DIAGNOSIS — R07.89 CHEST WALL PAIN: ICD-10-CM

## 2020-12-08 DIAGNOSIS — I12.9 HYPERTENSIVE KIDNEY DISEASE WITH STAGE 3 CHRONIC KIDNEY DISEASE, UNSPECIFIED WHETHER STAGE 3A OR 3B CKD: ICD-10-CM

## 2020-12-08 DIAGNOSIS — N18.30 HYPERTENSIVE KIDNEY DISEASE WITH STAGE 3 CHRONIC KIDNEY DISEASE, UNSPECIFIED WHETHER STAGE 3A OR 3B CKD: ICD-10-CM

## 2020-12-08 DIAGNOSIS — E53.8 B12 DEFICIENCY: ICD-10-CM

## 2020-12-08 PROCEDURE — 1159F PR MEDICATION LIST DOCUMENTED IN MEDICAL RECORD: ICD-10-PCS | Mod: S$GLB,,, | Performed by: INTERNAL MEDICINE

## 2020-12-08 PROCEDURE — 3288F FALL RISK ASSESSMENT DOCD: CPT | Mod: CPTII,S$GLB,, | Performed by: INTERNAL MEDICINE

## 2020-12-08 PROCEDURE — 1159F MED LIST DOCD IN RCRD: CPT | Mod: S$GLB,,, | Performed by: INTERNAL MEDICINE

## 2020-12-08 PROCEDURE — 99499 RISK ADDL DX/OHS AUDIT: ICD-10-PCS | Mod: S$GLB,,, | Performed by: INTERNAL MEDICINE

## 2020-12-08 PROCEDURE — 71100 X-RAY EXAM RIBS UNI 2 VIEWS: CPT | Mod: 26,LT,, | Performed by: RADIOLOGY

## 2020-12-08 PROCEDURE — 3288F PR FALLS RISK ASSESSMENT DOCUMENTED: ICD-10-PCS | Mod: CPTII,S$GLB,, | Performed by: INTERNAL MEDICINE

## 2020-12-08 PROCEDURE — 1125F PR PAIN SEVERITY QUANTIFIED, PAIN PRESENT: ICD-10-PCS | Mod: S$GLB,,, | Performed by: INTERNAL MEDICINE

## 2020-12-08 PROCEDURE — 99214 PR OFFICE/OUTPT VISIT, EST, LEVL IV, 30-39 MIN: ICD-10-PCS | Mod: S$GLB,,, | Performed by: INTERNAL MEDICINE

## 2020-12-08 PROCEDURE — 71100 X-RAY EXAM RIBS UNI 2 VIEWS: CPT | Mod: TC,FY,LT

## 2020-12-08 PROCEDURE — 99999 PR PBB SHADOW E&M-EST. PATIENT-LVL IV: ICD-10-PCS | Mod: PBBFAC,,, | Performed by: INTERNAL MEDICINE

## 2020-12-08 PROCEDURE — 99214 OFFICE O/P EST MOD 30 MIN: CPT | Mod: S$GLB,,, | Performed by: INTERNAL MEDICINE

## 2020-12-08 PROCEDURE — 99499 UNLISTED E&M SERVICE: CPT | Mod: S$GLB,,, | Performed by: INTERNAL MEDICINE

## 2020-12-08 PROCEDURE — 99999 PR PBB SHADOW E&M-EST. PATIENT-LVL IV: CPT | Mod: PBBFAC,,, | Performed by: INTERNAL MEDICINE

## 2020-12-08 PROCEDURE — 1100F PR PT FALLS ASSESS DOC 2+ FALLS/FALL W/INJURY/YR: ICD-10-PCS | Mod: CPTII,S$GLB,, | Performed by: INTERNAL MEDICINE

## 2020-12-08 PROCEDURE — 1125F AMNT PAIN NOTED PAIN PRSNT: CPT | Mod: S$GLB,,, | Performed by: INTERNAL MEDICINE

## 2020-12-08 PROCEDURE — 1100F PTFALLS ASSESS-DOCD GE2>/YR: CPT | Mod: CPTII,S$GLB,, | Performed by: INTERNAL MEDICINE

## 2020-12-08 PROCEDURE — 71100 XR RIBS 2 VIEW LEFT: ICD-10-PCS | Mod: 26,LT,, | Performed by: RADIOLOGY

## 2020-12-08 RX ORDER — TRAMADOL HYDROCHLORIDE 50 MG/1
50 TABLET ORAL EVERY 12 HOURS PRN
Qty: 10 EACH | Refills: 0 | Status: SHIPPED | OUTPATIENT
Start: 2020-12-08 | End: 2020-12-15

## 2020-12-08 NOTE — PROGRESS NOTES
"Subjective:   Patient ID: Viktor Townsend Sr. is a 87 y.o. male  Chief complaint:   Chief Complaint   Patient presents with    Back Pain     had a fall on Saturday       HPI  Pt here for  appt for back pain   Pcp: Dr. Jones and pt is new to me     87M with CKD3, HTN, Gout, HLD, MGUS, IFG, chronic lower back pain, b12 def      Pt had fall and here for further evaluation     Was sitting on steps on porch area and his 50# dog jumped on him and knocked him into metal post. He was on top step and held onto banister to prevent from falling onto ground  - no head trauma and no LOC   - reports pain is mainly at left lower back below scapula   - occurred Saturday about 5 days ago   - pain left lower back - ttp and with cough   - no rash   Pain achy and 3-9/10  - this feels different than chronic lower back pain    chronic lumbar back pain   - taking apap prn for this and stable    HTN:   bp elevated today  - took lisinopril 20mg today   - checks bp at home and per him has been at goal   - suspects inc bp due to pain     Review of Systems    Objective:  Vitals:    12/08/20 1456   BP: (!) 160/78   BP Location: Left arm   Patient Position: Sitting   Pulse: 80   SpO2: 98%   Weight: 80.9 kg (178 lb 5.6 oz)   Height: 5' 3" (1.6 m)     Body mass index is 31.59 kg/m².    Physical Exam  Vitals signs reviewed.   Constitutional:       Appearance: He is well-developed.   HENT:      Head: Normocephalic and atraumatic.      Right Ear: Tympanic membrane normal.      Left Ear: Tympanic membrane normal.      Nose:      Comments: Wearing mask   Eyes:      Extraocular Movements: Extraocular movements intact.      Conjunctiva/sclera: Conjunctivae normal.   Neck:      Musculoskeletal: Neck supple.   Cardiovascular:      Rate and Rhythm: Normal rate and regular rhythm.      Pulses: Normal pulses.      Heart sounds: Normal heart sounds.   Pulmonary:      Effort: Pulmonary effort is normal. No respiratory distress.      Breath sounds: Normal " breath sounds. No wheezing, rhonchi or rales.   Abdominal:      General: Bowel sounds are normal.      Palpations: Abdomen is soft.   Musculoskeletal: Normal range of motion.         General: Tenderness present. No swelling.        Arms:       Comments: No midline pain of spine  No ttp of UE, LE      Lymphadenopathy:      Cervical: No cervical adenopathy.   Skin:     General: Skin is warm and dry.   Neurological:      Mental Status: He is alert and oriented to person, place, and time. Mental status is at baseline.   Psychiatric:         Mood and Affect: Mood normal.         Behavior: Behavior normal.         Thought Content: Thought content normal.         Judgment: Judgment normal.         Assessment:  1. Fall, initial encounter    2. B12 deficiency    3. Chest wall pain    4. Hypertensive kidney disease with stage 3 chronic kidney disease, unspecified whether stage 3a or 3b CKD        Plan:  Viktor was seen today for back pain.    Diagnoses and all orders for this visit:    Fall, initial encounter    B12 deficiency    Chest wall pain  -     X-Ray Ribs 2 View Left; Future    Hypertensive kidney disease with stage 3 chronic kidney disease, unspecified whether stage 3a or 3b CKD    Other orders  -     traMADoL (ULTRAM) 50 mg tablet; Take 1 tablet (50 mg total) by mouth every 12 (twelve) hours as needed for Pain.    - due for annual labs - please arrange   - schedule nv in 1-2 weeks for BP check - prev bp controlled per pt at home - suspect elevation 2/2 to pain     Labs today   Xray today to r/o rib fracture  F/u with pcp in 2-3 weeks   Tramadol #10 tabs given- counseled on SE and to take at [facility] to hel paddress pain prior to sleep   Avoid nsaids due to CKD3  Ok to use apap prn - do not exceed 3g/24h    ADDNEDUM:  Xray shows nondisplaced rib fx with small pleural effusion  - called both home and cell numbers and no answer - lmovm   - was able to speak directly to pt - see telephone note with details   All questions  were answered and pt verbalized understanding of plan.     Health Maintenance   Topic Date Due    Lipid Panel  12/08/2025    TETANUS VACCINE  08/08/2026    Pneumococcal Vaccine (65+ Low/Medium Risk)  Completed          Detail Level: Zone Quality 226: Preventive Care And Screening: Tobacco Use: Screening And Cessation Intervention: Patient screened for tobacco use and is an ex/non-smoker

## 2020-12-09 ENCOUNTER — TELEPHONE (OUTPATIENT)
Dept: INTERNAL MEDICINE | Facility: CLINIC | Age: 85
End: 2020-12-09

## 2020-12-09 DIAGNOSIS — S22.42XD CLOSED FRACTURE OF MULTIPLE RIBS OF LEFT SIDE WITH ROUTINE HEALING, SUBSEQUENT ENCOUNTER: Primary | ICD-10-CM

## 2020-12-09 NOTE — TELEPHONE ENCOUNTER
----- Message from Rosita Alva sent at 12/9/2020 10:52 AM CST -----  Regarding: call back  Type: Patient Call Back    Who called:Jayla     What is the request in detail: Jayla, wife of the patient is requesting a call back from the staff. She stated that the office of Dr. Li called in regards to her  on yesterday to go over his results.    Can the clinic reply by [facility]?no    Would the patient rather a call back or a response via [facility]? Call back     Best call back number:[phone]

## 2020-12-09 NOTE — TELEPHONE ENCOUNTER
Called and spoke directly to pt - reviewed xray findings   - reviewed at length er and rtc prompts   - cont apap prn, can take tramadol prn for btp - rx given at appt yesterday  - encouraged ambulation as tolerated     - will repeat xray in 4 weeks - please arrange    All questions were answered and pt verbalized understanding of plan.

## 2021-01-07 ENCOUNTER — HOSPITAL ENCOUNTER (OUTPATIENT)
Dept: RADIOLOGY | Facility: OTHER | Age: 86
Discharge: HOME | End: 2021-01-07
Attending: INTERNAL MEDICINE
Payer: MEDICARE

## 2021-01-07 DIAGNOSIS — S22.42XD CLOSED FRACTURE OF MULTIPLE RIBS OF LEFT SIDE WITH ROUTINE HEALING, SUBSEQUENT ENCOUNTER: ICD-10-CM

## 2021-01-07 PROCEDURE — 71100 X-RAY EXAM RIBS UNI 2 VIEWS: CPT | Mod: 26,LT,, | Performed by: RADIOLOGY

## 2021-01-07 PROCEDURE — 71100 X-RAY EXAM RIBS UNI 2 VIEWS: CPT | Mod: TC,FY,LT

## 2021-01-07 PROCEDURE — 71100 XR RIBS 2 VIEW LEFT: ICD-10-PCS | Mod: 26,LT,, | Performed by: RADIOLOGY

## 2021-01-12 ENCOUNTER — TELEPHONE (OUTPATIENT)
Dept: INTERNAL MEDICINE | Facility: CLINIC | Age: 86
End: 2021-01-12

## 2021-01-12 DIAGNOSIS — N18.30 STAGE 3 CHRONIC KIDNEY DISEASE, UNSPECIFIED WHETHER STAGE 3A OR 3B CKD: Primary | ICD-10-CM

## 2021-01-13 NOTE — TELEPHONE ENCOUNTER
Spoke with pt, they had a verbal understanding. Pt feels much better. [facility] He is worried about them. He is not sure why/how he stopped getting scheduled with nephrology.

## 2021-03-26 ENCOUNTER — OFFICE VISIT (OUTPATIENT)
Dept: ORTHOPEDICS | Facility: CLINIC | Age: 86
End: 2021-03-26
Payer: MEDICARE

## 2021-03-26 ENCOUNTER — HOSPITAL ENCOUNTER (OUTPATIENT)
Dept: RADIOLOGY | Facility: HOSPITAL | Age: 86
Discharge: HOME | End: 2021-03-26
Attending: ORTHOPAEDIC SURGERY
Payer: MEDICARE

## 2021-03-26 VITALS — WEIGHT: 166.69 LBS | HEIGHT: 63 IN | BODY MASS INDEX: 29.54 KG/M2

## 2021-03-26 DIAGNOSIS — M51.36 DDD (DEGENERATIVE DISC DISEASE), LUMBAR: ICD-10-CM

## 2021-03-26 DIAGNOSIS — R26.9 GAIT ABNORMALITY: Primary | ICD-10-CM

## 2021-03-26 PROCEDURE — 99999 PR PBB SHADOW E&M-EST. PATIENT-LVL III: CPT | Mod: PBBFAC,,, | Performed by: ORTHOPAEDIC SURGERY

## 2021-03-26 PROCEDURE — 72120 XR LUMBAR SPINE AP AND LAT WITH FLEX/EXT: ICD-10-PCS | Mod: 26,,, | Performed by: RADIOLOGY

## 2021-03-26 PROCEDURE — 99204 PR OFFICE/OUTPT VISIT, NEW, LEVL IV, 45-59 MIN: ICD-10-PCS | Mod: S$GLB,,, | Performed by: ORTHOPAEDIC SURGERY

## 2021-03-26 PROCEDURE — 3288F PR FALLS RISK ASSESSMENT DOCUMENTED: ICD-10-PCS | Mod: CPTII,S$GLB,, | Performed by: ORTHOPAEDIC SURGERY

## 2021-03-26 PROCEDURE — 1125F PR PAIN SEVERITY QUANTIFIED, PAIN PRESENT: ICD-10-PCS | Mod: S$GLB,,, | Performed by: ORTHOPAEDIC SURGERY

## 2021-03-26 PROCEDURE — 72100 X-RAY EXAM L-S SPINE 2/3 VWS: CPT | Mod: TC

## 2021-03-26 PROCEDURE — 99204 OFFICE O/P NEW MOD 45 MIN: CPT | Mod: S$GLB,,, | Performed by: ORTHOPAEDIC SURGERY

## 2021-03-26 PROCEDURE — 72120 X-RAY BEND ONLY L-S SPINE: CPT | Mod: 26,,, | Performed by: RADIOLOGY

## 2021-03-26 PROCEDURE — 1100F PR PT FALLS ASSESS DOC 2+ FALLS/FALL W/INJURY/YR: ICD-10-PCS | Mod: CPTII,S$GLB,, | Performed by: ORTHOPAEDIC SURGERY

## 2021-03-26 PROCEDURE — 1125F AMNT PAIN NOTED PAIN PRSNT: CPT | Mod: S$GLB,,, | Performed by: ORTHOPAEDIC SURGERY

## 2021-03-26 PROCEDURE — 1159F MED LIST DOCD IN RCRD: CPT | Mod: S$GLB,,, | Performed by: ORTHOPAEDIC SURGERY

## 2021-03-26 PROCEDURE — 1159F PR MEDICATION LIST DOCUMENTED IN MEDICAL RECORD: ICD-10-PCS | Mod: S$GLB,,, | Performed by: ORTHOPAEDIC SURGERY

## 2021-03-26 PROCEDURE — 99999 PR PBB SHADOW E&M-EST. PATIENT-LVL III: ICD-10-PCS | Mod: PBBFAC,,, | Performed by: ORTHOPAEDIC SURGERY

## 2021-03-26 PROCEDURE — 1100F PTFALLS ASSESS-DOCD GE2>/YR: CPT | Mod: CPTII,S$GLB,, | Performed by: ORTHOPAEDIC SURGERY

## 2021-03-26 PROCEDURE — 72100 XR LUMBAR SPINE AP AND LAT WITH FLEX/EXT: ICD-10-PCS | Mod: 26,,, | Performed by: RADIOLOGY

## 2021-03-26 PROCEDURE — 72100 X-RAY EXAM L-S SPINE 2/3 VWS: CPT | Mod: 26,,, | Performed by: RADIOLOGY

## 2021-03-26 PROCEDURE — 3288F FALL RISK ASSESSMENT DOCD: CPT | Mod: CPTII,S$GLB,, | Performed by: ORTHOPAEDIC SURGERY

## 2021-03-31 ENCOUNTER — TELEPHONE (OUTPATIENT)
Dept: INTERNAL MEDICINE | Facility: CLINIC | Age: 86
End: 2021-03-31

## 2021-04-01 ENCOUNTER — OFFICE VISIT (OUTPATIENT)
Dept: NEPHROLOGY | Facility: CLINIC | Age: 86
End: 2021-04-01
Payer: MEDICARE

## 2021-04-01 ENCOUNTER — LAB VISIT (OUTPATIENT)
Dept: LAB | Facility: HOSPITAL | Age: 86
End: 2021-04-01
Payer: MEDICARE

## 2021-04-01 VITALS
OXYGEN SATURATION: 98 % | HEIGHT: 63 IN | BODY MASS INDEX: 30.86 KG/M2 | HEART RATE: 69 BPM | SYSTOLIC BLOOD PRESSURE: 138 MMHG | WEIGHT: 174.19 LBS | DIASTOLIC BLOOD PRESSURE: 76 MMHG

## 2021-04-01 DIAGNOSIS — E55.9 VITAMIN D DEFICIENCY: ICD-10-CM

## 2021-04-01 DIAGNOSIS — N18.30 STAGE 3 CHRONIC KIDNEY DISEASE, UNSPECIFIED WHETHER STAGE 3A OR 3B CKD: ICD-10-CM

## 2021-04-01 DIAGNOSIS — N18.32 STAGE 3B CHRONIC KIDNEY DISEASE: ICD-10-CM

## 2021-04-01 DIAGNOSIS — D47.2 MGUS (MONOCLONAL GAMMOPATHY OF UNKNOWN SIGNIFICANCE): ICD-10-CM

## 2021-04-01 DIAGNOSIS — M10.9 GOUT, UNSPECIFIED CAUSE, UNSPECIFIED CHRONICITY, UNSPECIFIED SITE: ICD-10-CM

## 2021-04-01 DIAGNOSIS — N18.32 STAGE 3B CHRONIC KIDNEY DISEASE: Primary | ICD-10-CM

## 2021-04-01 LAB
25(OH)D3+25(OH)D2 SERPL-MCNC: 53 NG/ML (ref 30–96)
ALBUMIN SERPL BCP-MCNC: 3.8 G/DL (ref 3.5–5.2)
ANION GAP SERPL CALC-SCNC: 7 MMOL/L (ref 8–16)
BUN SERPL-MCNC: 27 MG/DL (ref 8–23)
CALCIUM SERPL-MCNC: 9.3 MG/DL (ref 8.7–10.5)
CHLORIDE SERPL-SCNC: 105 MMOL/L (ref 95–110)
CO2 SERPL-SCNC: 28 MMOL/L (ref 23–29)
CREAT SERPL-MCNC: 2.1 MG/DL (ref 0.5–1.4)
EST. GFR  (AFRICAN AMERICAN): 31.8 ML/MIN/1.73 M^2
EST. GFR  (NON AFRICAN AMERICAN): 27.5 ML/MIN/1.73 M^2
GLUCOSE SERPL-MCNC: 86 MG/DL (ref 70–110)
PHOSPHATE SERPL-MCNC: 3.1 MG/DL (ref 2.7–4.5)
POTASSIUM SERPL-SCNC: 4.2 MMOL/L (ref 3.5–5.1)
PTH-INTACT SERPL-MCNC: 37 PG/ML (ref 9–77)
SODIUM SERPL-SCNC: 140 MMOL/L (ref 136–145)

## 2021-04-01 PROCEDURE — 99999 PR PBB SHADOW E&M-EST. PATIENT-LVL III: ICD-10-PCS | Mod: PBBFAC,GC,, | Performed by: STUDENT IN AN ORGANIZED HEALTH CARE EDUCATION/TRAINING PROGRAM

## 2021-04-01 PROCEDURE — 84165 PROTEIN E-PHORESIS SERUM: CPT | Performed by: STUDENT IN AN ORGANIZED HEALTH CARE EDUCATION/TRAINING PROGRAM

## 2021-04-01 PROCEDURE — 84165 PROTEIN E-PHORESIS SERUM: CPT | Mod: 26,,, | Performed by: PATHOLOGY

## 2021-04-01 PROCEDURE — 82306 VITAMIN D 25 HYDROXY: CPT | Performed by: STUDENT IN AN ORGANIZED HEALTH CARE EDUCATION/TRAINING PROGRAM

## 2021-04-01 PROCEDURE — 83520 IMMUNOASSAY QUANT NOS NONAB: CPT | Performed by: STUDENT IN AN ORGANIZED HEALTH CARE EDUCATION/TRAINING PROGRAM

## 2021-04-01 PROCEDURE — 80069 RENAL FUNCTION PANEL: CPT | Performed by: STUDENT IN AN ORGANIZED HEALTH CARE EDUCATION/TRAINING PROGRAM

## 2021-04-01 PROCEDURE — 86334 IMMUNOFIX E-PHORESIS SERUM: CPT | Mod: 26,,, | Performed by: PATHOLOGY

## 2021-04-01 PROCEDURE — 86334 PATHOLOGIST INTERPRETATION IFE: ICD-10-PCS | Mod: 26,,, | Performed by: PATHOLOGY

## 2021-04-01 PROCEDURE — 86334 IMMUNOFIX E-PHORESIS SERUM: CPT | Performed by: STUDENT IN AN ORGANIZED HEALTH CARE EDUCATION/TRAINING PROGRAM

## 2021-04-01 PROCEDURE — 84165 PATHOLOGIST INTERPRETATION SPE: ICD-10-PCS | Mod: 26,,, | Performed by: PATHOLOGY

## 2021-04-01 PROCEDURE — 99999 PR PBB SHADOW E&M-EST. PATIENT-LVL III: CPT | Mod: PBBFAC,GC,, | Performed by: STUDENT IN AN ORGANIZED HEALTH CARE EDUCATION/TRAINING PROGRAM

## 2021-04-01 PROCEDURE — 83970 ASSAY OF PARATHORMONE: CPT | Performed by: STUDENT IN AN ORGANIZED HEALTH CARE EDUCATION/TRAINING PROGRAM

## 2021-04-01 PROCEDURE — 36415 COLL VENOUS BLD VENIPUNCTURE: CPT | Performed by: STUDENT IN AN ORGANIZED HEALTH CARE EDUCATION/TRAINING PROGRAM

## 2021-04-01 RX ORDER — ALLOPURINOL 100 MG/1
TABLET ORAL
Qty: 90 TABLET | Refills: 3 | Status: SHIPPED | OUTPATIENT
Start: 2021-04-01 | End: 2021-11-01 | Stop reason: SDUPTHER

## 2021-04-05 LAB
ALBUMIN SERPL ELPH-MCNC: 3.9 G/DL (ref 3.35–5.55)
ALPHA1 GLOB SERPL ELPH-MCNC: 0.26 G/DL (ref 0.17–0.41)
ALPHA2 GLOB SERPL ELPH-MCNC: 0.8 G/DL (ref 0.43–0.99)
B-GLOBULIN SERPL ELPH-MCNC: 0.58 G/DL (ref 0.5–1.1)
GAMMA GLOB SERPL ELPH-MCNC: 1.15 G/DL (ref 0.67–1.58)
INTERPRETATION SERPL IFE-IMP: NORMAL
KAPPA LC SER QL IA: 11.3 MG/DL (ref 0.33–1.94)
KAPPA LC/LAMBDA SER IA: 6.69 (ref 0.26–1.65)
LAMBDA LC SER QL IA: 1.69 MG/DL (ref 0.57–2.63)
PROT SERPL-MCNC: 6.7 G/DL (ref 6–8.4)

## 2021-04-06 LAB
PATHOLOGIST INTERPRETATION IFE: NORMAL
PATHOLOGIST INTERPRETATION SPE: NORMAL

## 2021-04-07 ENCOUNTER — HOSPITAL ENCOUNTER (OUTPATIENT)
Dept: RADIOLOGY | Facility: HOSPITAL | Age: 86
Discharge: HOME | End: 2021-04-07
Attending: ORTHOPAEDIC SURGERY
Payer: MEDICARE

## 2021-04-07 ENCOUNTER — OFFICE VISIT (OUTPATIENT)
Dept: ORTHOPEDICS | Facility: CLINIC | Age: 86
End: 2021-04-07
Payer: MEDICARE

## 2021-04-07 VITALS — HEIGHT: 63 IN | WEIGHT: 174.19 LBS | BODY MASS INDEX: 30.86 KG/M2

## 2021-04-07 DIAGNOSIS — G95.9 MYELOPATHY: Primary | ICD-10-CM

## 2021-04-07 DIAGNOSIS — R26.9 GAIT ABNORMALITY: ICD-10-CM

## 2021-04-07 DIAGNOSIS — M51.36 DDD (DEGENERATIVE DISC DISEASE), LUMBAR: ICD-10-CM

## 2021-04-07 DIAGNOSIS — M50.30 DDD (DEGENERATIVE DISC DISEASE), CERVICAL: ICD-10-CM

## 2021-04-07 PROCEDURE — 1101F PR PT FALLS ASSESS DOC 0-1 FALLS W/OUT INJ PAST YR: ICD-10-PCS | Mod: CPTII,S$GLB,, | Performed by: ORTHOPAEDIC SURGERY

## 2021-04-07 PROCEDURE — 72050 X-RAY EXAM NECK SPINE 4/5VWS: CPT | Mod: TC

## 2021-04-07 PROCEDURE — 1159F PR MEDICATION LIST DOCUMENTED IN MEDICAL RECORD: ICD-10-PCS | Mod: S$GLB,,, | Performed by: ORTHOPAEDIC SURGERY

## 2021-04-07 PROCEDURE — 72050 XR CERVICAL SPINE AP LAT WITH FLEX EXTEN: ICD-10-PCS | Mod: 26,,, | Performed by: RADIOLOGY

## 2021-04-07 PROCEDURE — 99499 UNLISTED E&M SERVICE: CPT | Mod: S$GLB,,, | Performed by: ORTHOPAEDIC SURGERY

## 2021-04-07 PROCEDURE — 72148 MRI SPINE CERVICAL-THORACIC-LUMBAR WITHOUT CONTRAST (XPD): ICD-10-PCS | Mod: 26,,, | Performed by: RADIOLOGY

## 2021-04-07 PROCEDURE — 72070 XR THORACIC SPINE AP LATERAL: ICD-10-PCS | Mod: 26,,, | Performed by: RADIOLOGY

## 2021-04-07 PROCEDURE — 72146 MRI SPINE CERVICAL-THORACIC-LUMBAR WITHOUT CONTRAST (XPD): ICD-10-PCS | Mod: 26,,, | Performed by: RADIOLOGY

## 2021-04-07 PROCEDURE — 1101F PT FALLS ASSESS-DOCD LE1/YR: CPT | Mod: CPTII,S$GLB,, | Performed by: ORTHOPAEDIC SURGERY

## 2021-04-07 PROCEDURE — 1159F MED LIST DOCD IN RCRD: CPT | Mod: S$GLB,,, | Performed by: ORTHOPAEDIC SURGERY

## 2021-04-07 PROCEDURE — 72141 MRI NECK SPINE W/O DYE: CPT | Mod: 26,,, | Performed by: RADIOLOGY

## 2021-04-07 PROCEDURE — 3288F PR FALLS RISK ASSESSMENT DOCUMENTED: ICD-10-PCS | Mod: CPTII,S$GLB,, | Performed by: ORTHOPAEDIC SURGERY

## 2021-04-07 PROCEDURE — 72148 MRI LUMBAR SPINE W/O DYE: CPT | Mod: 26,,, | Performed by: RADIOLOGY

## 2021-04-07 PROCEDURE — 99213 OFFICE O/P EST LOW 20 MIN: CPT | Mod: S$GLB,,, | Performed by: ORTHOPAEDIC SURGERY

## 2021-04-07 PROCEDURE — 99499 RISK ADDL DX/OHS AUDIT: ICD-10-PCS | Mod: S$GLB,,, | Performed by: ORTHOPAEDIC SURGERY

## 2021-04-07 PROCEDURE — 72070 X-RAY EXAM THORAC SPINE 2VWS: CPT | Mod: TC

## 2021-04-07 PROCEDURE — 1125F AMNT PAIN NOTED PAIN PRSNT: CPT | Mod: S$GLB,,, | Performed by: ORTHOPAEDIC SURGERY

## 2021-04-07 PROCEDURE — 3288F FALL RISK ASSESSMENT DOCD: CPT | Mod: CPTII,S$GLB,, | Performed by: ORTHOPAEDIC SURGERY

## 2021-04-07 PROCEDURE — 99999 PR PBB SHADOW E&M-EST. PATIENT-LVL III: CPT | Mod: PBBFAC,,, | Performed by: ORTHOPAEDIC SURGERY

## 2021-04-07 PROCEDURE — 99213 PR OFFICE/OUTPT VISIT, EST, LEVL III, 20-29 MIN: ICD-10-PCS | Mod: S$GLB,,, | Performed by: ORTHOPAEDIC SURGERY

## 2021-04-07 PROCEDURE — 72141 MRI SPINE CERVICAL-THORACIC-LUMBAR WITHOUT CONTRAST (XPD): ICD-10-PCS | Mod: 26,,, | Performed by: RADIOLOGY

## 2021-04-07 PROCEDURE — 72050 X-RAY EXAM NECK SPINE 4/5VWS: CPT | Mod: 26,,, | Performed by: RADIOLOGY

## 2021-04-07 PROCEDURE — 1125F PR PAIN SEVERITY QUANTIFIED, PAIN PRESENT: ICD-10-PCS | Mod: S$GLB,,, | Performed by: ORTHOPAEDIC SURGERY

## 2021-04-07 PROCEDURE — 99999 PR PBB SHADOW E&M-EST. PATIENT-LVL III: ICD-10-PCS | Mod: PBBFAC,,, | Performed by: ORTHOPAEDIC SURGERY

## 2021-04-07 PROCEDURE — 72146 MRI CHEST SPINE W/O DYE: CPT | Mod: TC

## 2021-04-07 PROCEDURE — 72146 MRI CHEST SPINE W/O DYE: CPT | Mod: 26,,, | Performed by: RADIOLOGY

## 2021-04-07 PROCEDURE — 72070 X-RAY EXAM THORAC SPINE 2VWS: CPT | Mod: 26,,, | Performed by: RADIOLOGY

## 2021-04-09 DIAGNOSIS — E78.00 PURE HYPERCHOLESTEROLEMIA: ICD-10-CM

## 2021-04-09 RX ORDER — PRAVASTATIN SODIUM 20 MG/1
20 TABLET ORAL DAILY
Qty: 90 TABLET | Refills: 0 | Status: SHIPPED | OUTPATIENT
Start: 2021-04-09 | End: 2021-05-07 | Stop reason: SDUPTHER

## 2021-04-15 ENCOUNTER — TELEPHONE (OUTPATIENT)
Dept: ORTHOPEDICS | Facility: CLINIC | Age: 86
End: 2021-04-15

## 2021-04-22 ENCOUNTER — TELEPHONE (OUTPATIENT)
Dept: INTERNAL MEDICINE | Facility: CLINIC | Age: 86
End: 2021-04-22

## 2021-05-07 ENCOUNTER — OFFICE VISIT (OUTPATIENT)
Dept: INTERNAL MEDICINE | Facility: CLINIC | Age: 86
End: 2021-05-07
Payer: MEDICARE

## 2021-05-07 VITALS
WEIGHT: 172.38 LBS | SYSTOLIC BLOOD PRESSURE: 128 MMHG | HEART RATE: 97 BPM | DIASTOLIC BLOOD PRESSURE: 72 MMHG | BODY MASS INDEX: 30.54 KG/M2 | OXYGEN SATURATION: 100 % | HEIGHT: 63 IN

## 2021-05-07 DIAGNOSIS — E78.00 PURE HYPERCHOLESTEROLEMIA: ICD-10-CM

## 2021-05-07 PROCEDURE — 1159F PR MEDICATION LIST DOCUMENTED IN MEDICAL RECORD: ICD-10-PCS | Mod: S$GLB,,, | Performed by: PHYSICIAN ASSISTANT

## 2021-05-07 PROCEDURE — 1101F PT FALLS ASSESS-DOCD LE1/YR: CPT | Mod: CPTII,S$GLB,, | Performed by: PHYSICIAN ASSISTANT

## 2021-05-07 PROCEDURE — 1101F PR PT FALLS ASSESS DOC 0-1 FALLS W/OUT INJ PAST YR: ICD-10-PCS | Mod: CPTII,S$GLB,, | Performed by: PHYSICIAN ASSISTANT

## 2021-05-07 PROCEDURE — 3288F FALL RISK ASSESSMENT DOCD: CPT | Mod: CPTII,S$GLB,, | Performed by: PHYSICIAN ASSISTANT

## 2021-05-07 PROCEDURE — 99213 OFFICE O/P EST LOW 20 MIN: CPT | Mod: S$GLB,,, | Performed by: PHYSICIAN ASSISTANT

## 2021-05-07 PROCEDURE — 99999 PR PBB SHADOW E&M-EST. PATIENT-LVL III: ICD-10-PCS | Mod: PBBFAC,,, | Performed by: PHYSICIAN ASSISTANT

## 2021-05-07 PROCEDURE — 1159F MED LIST DOCD IN RCRD: CPT | Mod: S$GLB,,, | Performed by: PHYSICIAN ASSISTANT

## 2021-05-07 PROCEDURE — 1126F PR PAIN SEVERITY QUANTIFIED, NO PAIN PRESENT: ICD-10-PCS | Mod: S$GLB,,, | Performed by: PHYSICIAN ASSISTANT

## 2021-05-07 PROCEDURE — 99213 PR OFFICE/OUTPT VISIT, EST, LEVL III, 20-29 MIN: ICD-10-PCS | Mod: S$GLB,,, | Performed by: PHYSICIAN ASSISTANT

## 2021-05-07 PROCEDURE — 3288F PR FALLS RISK ASSESSMENT DOCUMENTED: ICD-10-PCS | Mod: CPTII,S$GLB,, | Performed by: PHYSICIAN ASSISTANT

## 2021-05-07 PROCEDURE — 1126F AMNT PAIN NOTED NONE PRSNT: CPT | Mod: S$GLB,,, | Performed by: PHYSICIAN ASSISTANT

## 2021-05-07 PROCEDURE — 99999 PR PBB SHADOW E&M-EST. PATIENT-LVL III: CPT | Mod: PBBFAC,,, | Performed by: PHYSICIAN ASSISTANT

## 2021-05-07 RX ORDER — PRAVASTATIN SODIUM 20 MG/1
20 TABLET ORAL DAILY
Qty: 90 TABLET | Refills: 0 | Status: SHIPPED | OUTPATIENT
Start: 2021-05-07 | End: 2021-11-09

## 2021-05-07 RX ORDER — HYDROCORTISONE 25 MG/G
CREAM TOPICAL 2 TIMES DAILY
Qty: 28 G | Refills: 0 | Status: SHIPPED | OUTPATIENT
Start: 2021-05-07

## 2021-05-10 ENCOUNTER — IMMUNIZATION (OUTPATIENT)
Dept: PHARMACY | Facility: CLINIC | Age: 86
End: 2021-05-10
Payer: MEDICARE

## 2021-05-11 ENCOUNTER — TELEPHONE (OUTPATIENT)
Dept: INTERNAL MEDICINE | Facility: CLINIC | Age: 86
End: 2021-05-11

## 2021-07-29 ENCOUNTER — TELEPHONE (OUTPATIENT)
Dept: NEPHROLOGY | Facility: CLINIC | Age: 86
End: 2021-07-29

## 2021-07-29 DIAGNOSIS — N18.31 STAGE 3A CHRONIC KIDNEY DISEASE: Primary | ICD-10-CM

## 2021-08-23 ENCOUNTER — IMMUNIZATION (OUTPATIENT)
Dept: PHARMACY | Facility: CLINIC | Age: 86
End: 2021-08-23
Payer: MEDICARE

## 2021-09-21 ENCOUNTER — TELEPHONE (OUTPATIENT)
Dept: NEPHROLOGY | Facility: CLINIC | Age: 86
End: 2021-09-21

## 2021-10-27 ENCOUNTER — PATIENT OUTREACH (OUTPATIENT)
Dept: ADMINISTRATIVE | Facility: OTHER | Age: 86
End: 2021-10-27
Payer: MEDICARE

## 2021-10-28 ENCOUNTER — TELEPHONE (OUTPATIENT)
Dept: NEPHROLOGY | Facility: CLINIC | Age: 86
End: 2021-10-28
Payer: MEDICARE

## 2021-10-29 ENCOUNTER — LAB VISIT (OUTPATIENT)
Dept: LAB | Facility: OTHER | Age: 86
End: 2021-10-29
Attending: INTERNAL MEDICINE
Payer: MEDICARE

## 2021-10-29 ENCOUNTER — TELEPHONE (OUTPATIENT)
Dept: NEPHROLOGY | Facility: CLINIC | Age: 86
End: 2021-10-29
Payer: MEDICARE

## 2021-10-29 DIAGNOSIS — N18.32 STAGE 3B CHRONIC KIDNEY DISEASE: Primary | ICD-10-CM

## 2021-10-29 DIAGNOSIS — N18.31 STAGE 3A CHRONIC KIDNEY DISEASE: ICD-10-CM

## 2021-10-29 LAB
ANION GAP SERPL CALC-SCNC: 11 MMOL/L (ref 8–16)
BASOPHILS # BLD AUTO: 0.11 K/UL (ref 0–0.2)
BASOPHILS NFR BLD: 1.7 % (ref 0–1.9)
BILIRUB UR QL STRIP: NEGATIVE
BUN SERPL-MCNC: 18 MG/DL (ref 8–23)
CALCIUM SERPL-MCNC: 9.8 MG/DL (ref 8.7–10.5)
CHLORIDE SERPL-SCNC: 106 MMOL/L (ref 95–110)
CLARITY UR: CLEAR
CO2 SERPL-SCNC: 24 MMOL/L (ref 23–29)
COLOR UR: YELLOW
CREAT SERPL-MCNC: 1.8 MG/DL (ref 0.5–1.4)
CREAT UR-MCNC: 128.1 MG/DL (ref 23–375)
DIFFERENTIAL METHOD: ABNORMAL
EOSINOPHIL # BLD AUTO: 0.5 K/UL (ref 0–0.5)
EOSINOPHIL NFR BLD: 7.6 % (ref 0–8)
ERYTHROCYTE [DISTWIDTH] IN BLOOD BY AUTOMATED COUNT: 13.2 % (ref 11.5–14.5)
EST. GFR  (AFRICAN AMERICAN): 38 ML/MIN/1.73 M^2
EST. GFR  (NON AFRICAN AMERICAN): 33 ML/MIN/1.73 M^2
GLUCOSE SERPL-MCNC: 91 MG/DL (ref 70–110)
GLUCOSE UR QL STRIP: NEGATIVE
HCT VFR BLD AUTO: 41.4 % (ref 40–54)
HGB BLD-MCNC: 13.1 G/DL (ref 14–18)
HGB UR QL STRIP: ABNORMAL
IMM GRANULOCYTES # BLD AUTO: 0.02 K/UL (ref 0–0.04)
IMM GRANULOCYTES NFR BLD AUTO: 0.3 % (ref 0–0.5)
KETONES UR QL STRIP: NEGATIVE
LEUKOCYTE ESTERASE UR QL STRIP: NEGATIVE
LYMPHOCYTES # BLD AUTO: 2.1 K/UL (ref 1–4.8)
LYMPHOCYTES NFR BLD: 31.7 % (ref 18–48)
MCH RBC QN AUTO: 28.9 PG (ref 27–31)
MCHC RBC AUTO-ENTMCNC: 31.6 G/DL (ref 32–36)
MCV RBC AUTO: 91 FL (ref 82–98)
MONOCYTES # BLD AUTO: 0.8 K/UL (ref 0.3–1)
MONOCYTES NFR BLD: 12.1 % (ref 4–15)
NEUTROPHILS # BLD AUTO: 3.1 K/UL (ref 1.8–7.7)
NEUTROPHILS NFR BLD: 46.6 % (ref 38–73)
NITRITE UR QL STRIP: NEGATIVE
NRBC BLD-RTO: 0 /100 WBC
PH UR STRIP: 6 [PH] (ref 5–8)
PLATELET # BLD AUTO: 190 K/UL (ref 150–450)
PMV BLD AUTO: 10.9 FL (ref 9.2–12.9)
POTASSIUM SERPL-SCNC: 4.1 MMOL/L (ref 3.5–5.1)
PROT UR QL STRIP: NEGATIVE
PROT UR-MCNC: 14 MG/DL (ref 0–15)
PROT/CREAT UR: 0.11 MG/G{CREAT} (ref 0–0.2)
RBC # BLD AUTO: 4.53 M/UL (ref 4.6–6.2)
SODIUM SERPL-SCNC: 141 MMOL/L (ref 136–145)
SP GR UR STRIP: 1.02 (ref 1–1.03)
URN SPEC COLLECT METH UR: ABNORMAL
UROBILINOGEN UR STRIP-ACNC: NEGATIVE EU/DL
WBC # BLD AUTO: 6.62 K/UL (ref 3.9–12.7)

## 2021-10-29 PROCEDURE — 85025 COMPLETE CBC W/AUTO DIFF WBC: CPT | Performed by: INTERNAL MEDICINE

## 2021-10-29 PROCEDURE — 36415 COLL VENOUS BLD VENIPUNCTURE: CPT | Performed by: INTERNAL MEDICINE

## 2021-10-29 PROCEDURE — 82570 ASSAY OF URINE CREATININE: CPT | Performed by: INTERNAL MEDICINE

## 2021-10-29 PROCEDURE — 81003 URINALYSIS AUTO W/O SCOPE: CPT | Performed by: INTERNAL MEDICINE

## 2021-10-29 PROCEDURE — 80048 BASIC METABOLIC PNL TOTAL CA: CPT | Performed by: INTERNAL MEDICINE

## 2021-11-01 DIAGNOSIS — M10.9 GOUT, UNSPECIFIED CAUSE, UNSPECIFIED CHRONICITY, UNSPECIFIED SITE: ICD-10-CM

## 2021-11-01 DIAGNOSIS — I10 ESSENTIAL HYPERTENSION: Primary | ICD-10-CM

## 2021-11-01 RX ORDER — ALLOPURINOL 100 MG/1
TABLET ORAL
Qty: 90 TABLET | Refills: 3 | Status: SHIPPED | OUTPATIENT
Start: 2021-11-01 | End: 2022-04-12 | Stop reason: SDUPTHER

## 2021-11-01 RX ORDER — LISINOPRIL 20 MG/1
20 TABLET ORAL DAILY
Qty: 30 TABLET | Refills: 3 | Status: SHIPPED | OUTPATIENT
Start: 2021-11-01 | End: 2022-02-03 | Stop reason: SDUPTHER

## 2021-11-05 ENCOUNTER — PES CALL (OUTPATIENT)
Dept: ADMINISTRATIVE | Facility: CLINIC | Age: 86
End: 2021-11-05
Payer: MEDICARE

## 2021-11-09 ENCOUNTER — TELEPHONE (OUTPATIENT)
Dept: NEPHROLOGY | Facility: CLINIC | Age: 86
End: 2021-11-09
Payer: MEDICARE

## 2021-11-09 DIAGNOSIS — E78.00 PURE HYPERCHOLESTEROLEMIA: ICD-10-CM

## 2021-11-09 RX ORDER — PRAVASTATIN SODIUM 20 MG/1
TABLET ORAL
Qty: 90 TABLET | Refills: 0 | Status: SHIPPED | OUTPATIENT
Start: 2021-11-09 | End: 2022-01-26

## 2021-11-23 ENCOUNTER — OFFICE VISIT (OUTPATIENT)
Dept: INTERNAL MEDICINE | Facility: CLINIC | Age: 86
End: 2021-11-23
Attending: FAMILY MEDICINE
Payer: MEDICARE

## 2021-11-23 VITALS
OXYGEN SATURATION: 98 % | HEIGHT: 63 IN | WEIGHT: 168.88 LBS | SYSTOLIC BLOOD PRESSURE: 138 MMHG | HEART RATE: 72 BPM | DIASTOLIC BLOOD PRESSURE: 80 MMHG | BODY MASS INDEX: 29.92 KG/M2

## 2021-11-23 DIAGNOSIS — N18.30 HYPERTENSIVE KIDNEY DISEASE WITH STAGE 3 CHRONIC KIDNEY DISEASE, UNSPECIFIED WHETHER STAGE 3A OR 3B CKD: Primary | ICD-10-CM

## 2021-11-23 DIAGNOSIS — I12.9 HYPERTENSIVE KIDNEY DISEASE WITH STAGE 3 CHRONIC KIDNEY DISEASE, UNSPECIFIED WHETHER STAGE 3A OR 3B CKD: Primary | ICD-10-CM

## 2021-11-23 DIAGNOSIS — I10 ESSENTIAL HYPERTENSION: ICD-10-CM

## 2021-11-23 PROCEDURE — 99999 PR PBB SHADOW E&M-EST. PATIENT-LVL III: CPT | Mod: PBBFAC,,, | Performed by: FAMILY MEDICINE

## 2021-11-23 PROCEDURE — 99999 PR PBB SHADOW E&M-EST. PATIENT-LVL III: ICD-10-PCS | Mod: PBBFAC,,, | Performed by: FAMILY MEDICINE

## 2021-11-23 PROCEDURE — 99214 PR OFFICE/OUTPT VISIT, EST, LEVL IV, 30-39 MIN: ICD-10-PCS | Mod: S$GLB,,, | Performed by: FAMILY MEDICINE

## 2021-11-23 PROCEDURE — 99214 OFFICE O/P EST MOD 30 MIN: CPT | Mod: S$GLB,,, | Performed by: FAMILY MEDICINE

## 2021-11-24 ENCOUNTER — IMMUNIZATION (OUTPATIENT)
Dept: PHARMACY | Facility: CLINIC | Age: 86
End: 2021-11-24
Payer: MEDICARE

## 2021-11-29 ENCOUNTER — IMMUNIZATION (OUTPATIENT)
Dept: INTERNAL MEDICINE | Facility: CLINIC | Age: 86
End: 2021-11-29
Payer: MEDICARE

## 2021-11-29 DIAGNOSIS — Z23 NEED FOR VACCINATION: Primary | ICD-10-CM

## 2021-11-29 PROCEDURE — 0004A COVID-19, MRNA, LNP-S, PF, 30 MCG/0.3 ML DOSE VACCINE: CPT | Mod: PBBFAC | Performed by: INTERNAL MEDICINE

## 2021-12-06 ENCOUNTER — LAB VISIT (OUTPATIENT)
Dept: LAB | Facility: OTHER | Age: 86
End: 2021-12-06
Attending: STUDENT IN AN ORGANIZED HEALTH CARE EDUCATION/TRAINING PROGRAM
Payer: MEDICARE

## 2021-12-06 DIAGNOSIS — N18.32 STAGE 3B CHRONIC KIDNEY DISEASE: ICD-10-CM

## 2021-12-06 LAB
ANION GAP SERPL CALC-SCNC: 11 MMOL/L (ref 8–16)
BASOPHILS # BLD AUTO: 0.09 K/UL (ref 0–0.2)
BASOPHILS NFR BLD: 1.4 % (ref 0–1.9)
BUN SERPL-MCNC: 24 MG/DL (ref 8–23)
CALCIUM SERPL-MCNC: 9.5 MG/DL (ref 8.7–10.5)
CHLORIDE SERPL-SCNC: 105 MMOL/L (ref 95–110)
CO2 SERPL-SCNC: 25 MMOL/L (ref 23–29)
CREAT SERPL-MCNC: 1.9 MG/DL (ref 0.5–1.4)
DIFFERENTIAL METHOD: ABNORMAL
EOSINOPHIL # BLD AUTO: 0.3 K/UL (ref 0–0.5)
EOSINOPHIL NFR BLD: 4.3 % (ref 0–8)
ERYTHROCYTE [DISTWIDTH] IN BLOOD BY AUTOMATED COUNT: 13.8 % (ref 11.5–14.5)
EST. GFR  (AFRICAN AMERICAN): 36 ML/MIN/1.73 M^2
EST. GFR  (NON AFRICAN AMERICAN): 31 ML/MIN/1.73 M^2
GLUCOSE SERPL-MCNC: 93 MG/DL (ref 70–110)
HCT VFR BLD AUTO: 41.3 % (ref 40–54)
HGB BLD-MCNC: 13.1 G/DL (ref 14–18)
IMM GRANULOCYTES # BLD AUTO: 0.02 K/UL (ref 0–0.04)
IMM GRANULOCYTES NFR BLD AUTO: 0.3 % (ref 0–0.5)
LYMPHOCYTES # BLD AUTO: 2.1 K/UL (ref 1–4.8)
LYMPHOCYTES NFR BLD: 32.2 % (ref 18–48)
MCH RBC QN AUTO: 28.9 PG (ref 27–31)
MCHC RBC AUTO-ENTMCNC: 31.7 G/DL (ref 32–36)
MCV RBC AUTO: 91 FL (ref 82–98)
MONOCYTES # BLD AUTO: 0.7 K/UL (ref 0.3–1)
MONOCYTES NFR BLD: 10.1 % (ref 4–15)
NEUTROPHILS # BLD AUTO: 3.3 K/UL (ref 1.8–7.7)
NEUTROPHILS NFR BLD: 51.7 % (ref 38–73)
NRBC BLD-RTO: 0 /100 WBC
PLATELET # BLD AUTO: 196 K/UL (ref 150–450)
PMV BLD AUTO: 11.2 FL (ref 9.2–12.9)
POTASSIUM SERPL-SCNC: 4 MMOL/L (ref 3.5–5.1)
RBC # BLD AUTO: 4.53 M/UL (ref 4.6–6.2)
SODIUM SERPL-SCNC: 141 MMOL/L (ref 136–145)
WBC # BLD AUTO: 6.46 K/UL (ref 3.9–12.7)

## 2021-12-06 PROCEDURE — 36415 COLL VENOUS BLD VENIPUNCTURE: CPT | Performed by: STUDENT IN AN ORGANIZED HEALTH CARE EDUCATION/TRAINING PROGRAM

## 2021-12-06 PROCEDURE — 85025 COMPLETE CBC W/AUTO DIFF WBC: CPT | Performed by: STUDENT IN AN ORGANIZED HEALTH CARE EDUCATION/TRAINING PROGRAM

## 2021-12-06 PROCEDURE — 80048 BASIC METABOLIC PNL TOTAL CA: CPT | Performed by: STUDENT IN AN ORGANIZED HEALTH CARE EDUCATION/TRAINING PROGRAM

## 2021-12-09 ENCOUNTER — OFFICE VISIT (OUTPATIENT)
Dept: NEPHROLOGY | Facility: CLINIC | Age: 86
End: 2021-12-09
Payer: MEDICARE

## 2021-12-09 VITALS
BODY MASS INDEX: 29.21 KG/M2 | WEIGHT: 164.88 LBS | OXYGEN SATURATION: 100 % | SYSTOLIC BLOOD PRESSURE: 120 MMHG | HEART RATE: 86 BPM | HEIGHT: 63 IN | DIASTOLIC BLOOD PRESSURE: 70 MMHG

## 2021-12-09 DIAGNOSIS — I10 ESSENTIAL HYPERTENSION: ICD-10-CM

## 2021-12-09 DIAGNOSIS — N18.31 STAGE 3A CHRONIC KIDNEY DISEASE: Primary | ICD-10-CM

## 2021-12-09 PROCEDURE — 99214 PR OFFICE/OUTPT VISIT, EST, LEVL IV, 30-39 MIN: ICD-10-PCS | Mod: GC,S$GLB,, | Performed by: STUDENT IN AN ORGANIZED HEALTH CARE EDUCATION/TRAINING PROGRAM

## 2021-12-09 PROCEDURE — 99999 PR PBB SHADOW E&M-EST. PATIENT-LVL III: ICD-10-PCS | Mod: PBBFAC,GC,, | Performed by: STUDENT IN AN ORGANIZED HEALTH CARE EDUCATION/TRAINING PROGRAM

## 2021-12-09 PROCEDURE — 99214 OFFICE O/P EST MOD 30 MIN: CPT | Mod: GC,S$GLB,, | Performed by: STUDENT IN AN ORGANIZED HEALTH CARE EDUCATION/TRAINING PROGRAM

## 2021-12-09 PROCEDURE — 99999 PR PBB SHADOW E&M-EST. PATIENT-LVL III: CPT | Mod: PBBFAC,GC,, | Performed by: STUDENT IN AN ORGANIZED HEALTH CARE EDUCATION/TRAINING PROGRAM

## 2021-12-13 ENCOUNTER — TELEPHONE (OUTPATIENT)
Dept: INTERNAL MEDICINE | Facility: CLINIC | Age: 86
End: 2021-12-13
Payer: MEDICARE

## 2021-12-13 DIAGNOSIS — Z77.090 ASBESTOS EXPOSURE: Primary | ICD-10-CM

## 2021-12-16 ENCOUNTER — TELEPHONE (OUTPATIENT)
Dept: INTERNAL MEDICINE | Facility: CLINIC | Age: 86
End: 2021-12-16
Payer: MEDICARE

## 2021-12-30 ENCOUNTER — OFFICE VISIT (OUTPATIENT)
Dept: PULMONOLOGY | Facility: CLINIC | Age: 86
End: 2021-12-30
Payer: MEDICARE

## 2021-12-30 VITALS
BODY MASS INDEX: 29.1 KG/M2 | OXYGEN SATURATION: 99 % | DIASTOLIC BLOOD PRESSURE: 70 MMHG | WEIGHT: 164.25 LBS | HEART RATE: 105 BPM | HEIGHT: 63 IN | SYSTOLIC BLOOD PRESSURE: 120 MMHG

## 2021-12-30 DIAGNOSIS — Z77.090 ASBESTOS EXPOSURE: ICD-10-CM

## 2021-12-30 PROCEDURE — 99999 PR PBB SHADOW E&M-EST. PATIENT-LVL III: CPT | Mod: PBBFAC,,, | Performed by: INTERNAL MEDICINE

## 2021-12-30 PROCEDURE — 1126F AMNT PAIN NOTED NONE PRSNT: CPT | Mod: CPTII,S$GLB,, | Performed by: INTERNAL MEDICINE

## 2021-12-30 PROCEDURE — 1101F PR PT FALLS ASSESS DOC 0-1 FALLS W/OUT INJ PAST YR: ICD-10-PCS | Mod: CPTII,S$GLB,, | Performed by: INTERNAL MEDICINE

## 2021-12-30 PROCEDURE — 99999 PR PBB SHADOW E&M-EST. PATIENT-LVL III: ICD-10-PCS | Mod: PBBFAC,,, | Performed by: INTERNAL MEDICINE

## 2021-12-30 PROCEDURE — 1159F PR MEDICATION LIST DOCUMENTED IN MEDICAL RECORD: ICD-10-PCS | Mod: CPTII,S$GLB,, | Performed by: INTERNAL MEDICINE

## 2021-12-30 PROCEDURE — 99203 PR OFFICE/OUTPT VISIT, NEW, LEVL III, 30-44 MIN: ICD-10-PCS | Mod: S$GLB,,, | Performed by: INTERNAL MEDICINE

## 2021-12-30 PROCEDURE — 3288F FALL RISK ASSESSMENT DOCD: CPT | Mod: CPTII,S$GLB,, | Performed by: INTERNAL MEDICINE

## 2021-12-30 PROCEDURE — 1160F PR REVIEW ALL MEDS BY PRESCRIBER/CLIN PHARMACIST DOCUMENTED: ICD-10-PCS | Mod: CPTII,S$GLB,, | Performed by: INTERNAL MEDICINE

## 2021-12-30 PROCEDURE — 1160F RVW MEDS BY RX/DR IN RCRD: CPT | Mod: CPTII,S$GLB,, | Performed by: INTERNAL MEDICINE

## 2021-12-30 PROCEDURE — 99203 OFFICE O/P NEW LOW 30 MIN: CPT | Mod: S$GLB,,, | Performed by: INTERNAL MEDICINE

## 2021-12-30 PROCEDURE — 1101F PT FALLS ASSESS-DOCD LE1/YR: CPT | Mod: CPTII,S$GLB,, | Performed by: INTERNAL MEDICINE

## 2021-12-30 PROCEDURE — 3288F PR FALLS RISK ASSESSMENT DOCUMENTED: ICD-10-PCS | Mod: CPTII,S$GLB,, | Performed by: INTERNAL MEDICINE

## 2021-12-30 PROCEDURE — 1126F PR PAIN SEVERITY QUANTIFIED, NO PAIN PRESENT: ICD-10-PCS | Mod: CPTII,S$GLB,, | Performed by: INTERNAL MEDICINE

## 2021-12-30 PROCEDURE — 1159F MED LIST DOCD IN RCRD: CPT | Mod: CPTII,S$GLB,, | Performed by: INTERNAL MEDICINE

## 2022-01-11 ENCOUNTER — TELEPHONE (OUTPATIENT)
Dept: HEMATOLOGY/ONCOLOGY | Facility: CLINIC | Age: 87
End: 2022-01-11
Payer: MEDICARE

## 2022-01-11 NOTE — TELEPHONE ENCOUNTER
Called Mr Townsend home number x 2. Someone pick the phone up and they hang up. Called the cell phone listed, no answer. Message left on voice mail. Per Dr Gregorio, asbestos exposure is not hem onc diagnosis. Mr Townsend recently saw pulmonary and doesn't have cancer. Please cancel his appt and inform him. He doesn't need to see Hem Onc physician.   The above information was provided in Mr Bruner voice mail message. Office contact number also provided for Mr Townsend to return our call to discuss further if needed.

## 2022-01-11 NOTE — TELEPHONE ENCOUNTER
----- Message from Sera Gregorio MD sent at 1/11/2022  6:20 AM CST -----  asbestos exposure is not hem onc diagnosis. pt recently saw pulmonary and doesn't have cancer. please cancel his appt and inform him. he doesn't need to see hem onc

## 2022-01-12 ENCOUNTER — TELEPHONE (OUTPATIENT)
Dept: NEPHROLOGY | Facility: CLINIC | Age: 87
End: 2022-01-12
Payer: MEDICARE

## 2022-01-12 NOTE — TELEPHONE ENCOUNTER
----- Message from Brooklyn Holguin sent at 12/10/2021  1:40 PM CST -----  Contact: Wife Jayla [phone]  Needs call back. Patient was seen yesterday. Wife has questions about future appts that are not showing up in patient's chart

## 2022-01-27 ENCOUNTER — PES CALL (OUTPATIENT)
Dept: ADMINISTRATIVE | Facility: CLINIC | Age: 87
End: 2022-01-27
Payer: MEDICARE

## 2022-02-03 DIAGNOSIS — I10 ESSENTIAL HYPERTENSION: ICD-10-CM

## 2022-02-03 DIAGNOSIS — E78.00 PURE HYPERCHOLESTEROLEMIA: ICD-10-CM

## 2022-02-03 RX ORDER — PRAVASTATIN SODIUM 20 MG/1
20 TABLET ORAL DAILY
Qty: 90 TABLET | Refills: 3 | Status: SHIPPED | OUTPATIENT
Start: 2022-02-03 | End: 2023-03-28 | Stop reason: SDUPTHER

## 2022-02-03 RX ORDER — LISINOPRIL 20 MG/1
20 TABLET ORAL DAILY
Qty: 30 TABLET | Refills: 3 | Status: SHIPPED | OUTPATIENT
Start: 2022-02-03 | End: 2022-05-04

## 2022-02-03 NOTE — TELEPHONE ENCOUNTER
----- Message from Mohit Rodriguez sent at 2/3/2022  3:46 PM CST -----  Regarding: Refill  Can the clinic reply in [facility]: NO           Please refill the medication(s) listed below. Please call the patient when the prescription(s) is ready for  at this phone number   [phone]           Medication #1 pravastatin (PRAVACHOL) 20 MG tablet         Medication #2 lisinopriL (PRINIVIL,ZESTRIL) 20 MG tablet           Preferred Pharmacy: [facility] #[id number] [address]

## 2022-02-03 NOTE — TELEPHONE ENCOUNTER
Care Due:                  Date            Visit Type   Department     Provider  --------------------------------------------------------------------------------                                EP -                              PRIMARY      [facility] INTERNAL  Kalia Gupta  Last Visit: 11-      [facility]   [facility]  Next Visit: None Scheduled  None         None Found                                                            Last  Test          Frequency    Reason                     Performed    Due Date  --------------------------------------------------------------------------------    CMP.........  12 months..  allopurinoL, pravastatin.  Not Found    Overdue    Lipid Panel.  12 months..  pravastatin..............  Not Found    Overdue    Uric Acid...  12 months..  allopurinoL..............  Not Found    Overdue    Powered by "[employer]" by [employer]. Reference number: [id number].   2/03/2022 4:04:58 PM CST

## 2022-03-03 ENCOUNTER — HOSPITAL ENCOUNTER (OUTPATIENT)
Dept: RADIOLOGY | Facility: OTHER | Age: 87
Discharge: HOME | End: 2022-03-03
Attending: STUDENT IN AN ORGANIZED HEALTH CARE EDUCATION/TRAINING PROGRAM
Payer: MEDICARE

## 2022-03-03 ENCOUNTER — HOSPITAL ENCOUNTER (OUTPATIENT)
Dept: RADIOLOGY | Facility: OTHER | Age: 87
Discharge: HOME | End: 2022-03-03
Attending: OTOLARYNGOLOGY
Payer: MEDICARE

## 2022-03-03 DIAGNOSIS — N18.31 STAGE 3A CHRONIC KIDNEY DISEASE: ICD-10-CM

## 2022-03-03 DIAGNOSIS — R13.10 DYSPHAGIA: ICD-10-CM

## 2022-03-03 PROCEDURE — 25500020 PHARM REV CODE 255: Performed by: OTOLARYNGOLOGY

## 2022-03-03 PROCEDURE — 76770 US EXAM ABDO BACK WALL COMP: CPT | Mod: TC

## 2022-03-03 PROCEDURE — A9698 NON-RAD CONTRAST MATERIALNOC: HCPCS | Performed by: OTOLARYNGOLOGY

## 2022-03-03 PROCEDURE — 76770 US RETROPERITONEAL COMPLETE: ICD-10-PCS | Mod: 26,,, | Performed by: RADIOLOGY

## 2022-03-03 PROCEDURE — 74220 FL ESOPHAGRAM COMPLETE: ICD-10-PCS | Mod: 26,,, | Performed by: RADIOLOGY

## 2022-03-03 PROCEDURE — 74220 X-RAY XM ESOPHAGUS 1CNTRST: CPT | Mod: 26,,, | Performed by: RADIOLOGY

## 2022-03-03 PROCEDURE — 74220 X-RAY XM ESOPHAGUS 1CNTRST: CPT | Mod: TC

## 2022-03-03 PROCEDURE — 76770 US EXAM ABDO BACK WALL COMP: CPT | Mod: 26,,, | Performed by: RADIOLOGY

## 2022-03-03 RX ADMIN — BARIUM SULFATE 176 G: 960 POWDER, FOR SUSPENSION ORAL at 11:03

## 2022-03-08 ENCOUNTER — PES CALL (OUTPATIENT)
Dept: ADMINISTRATIVE | Facility: CLINIC | Age: 87
End: 2022-03-08
Payer: MEDICARE

## 2022-04-06 ENCOUNTER — PES CALL (OUTPATIENT)
Dept: ADMINISTRATIVE | Facility: CLINIC | Age: 87
End: 2022-04-06
Payer: MEDICARE

## 2022-04-12 DIAGNOSIS — M10.9 GOUT, UNSPECIFIED CAUSE, UNSPECIFIED CHRONICITY, UNSPECIFIED SITE: ICD-10-CM

## 2022-04-12 DIAGNOSIS — I10 ESSENTIAL HYPERTENSION: ICD-10-CM

## 2022-04-12 NOTE — TELEPHONE ENCOUNTER
----- Message from Cassandra Walker sent at 4/12/2022  4:52 PM CDT -----  Regarding: refill  Type:  RX Refill Request    Who Called: Kanchan, wife  Refill or New Rx:refill  RX Name and Strength:allopurinoL (ZYLOPRIM) 100 MG tablet  How is the patient currently taking it? (ex. 1XDay):  Is this a 30 day or 90 day RX:90  Preferred Pharmacy with phone number:[id number] [address]  Local or Mail Order:local  Ordering Provider:  Would the patient rather a call back or a response via [facility]? call  Best Call Back Number:[phone]  Additional Information:

## 2022-04-12 NOTE — TELEPHONE ENCOUNTER
Care Due:                  Date            Visit Type   Department     Provider  --------------------------------------------------------------------------------                                EP -                              PRIMARY      [facility] INTERNAL  Kalia Gupta  Last Visit: 11-      [facility] ([facility])   [facility]  Next Visit: None Scheduled  None         None Found                                                            Last  Test          Frequency    Reason                     Performed    Due Date  --------------------------------------------------------------------------------    CMP.........  12 months..  allopurinoL, pravastatin.  12- 12-    Lipid Panel.  12 months..  pravastatin..............  12- 12-    Uric Acid...  12 months..  allopurinoL..............  Not Found    Overdue    Powered by [employer]. Reference number: [id number].   4/12/2022 5:02:42 PM CDT   No

## 2022-04-13 RX ORDER — ALLOPURINOL 100 MG/1
TABLET ORAL
Qty: 90 TABLET | Refills: 3 | Status: SHIPPED | OUTPATIENT
Start: 2022-04-13 | End: 2023-05-22 | Stop reason: SDUPTHER

## 2022-04-13 NOTE — TELEPHONE ENCOUNTER
Refill Routing Note   Medication(s) are not appropriate for processing by [facility] for the following reason(s):      - Required laboratory values are outdated  - Required laboratory values are abnormal    [facility] action(s):  Defer          Medication reconciliation completed: No     Appointments  past 12m or future 3m with PCP    Date Provider   Last Visit   11/23/2021 Kalia Jones MD   Next Visit   Visit date not found Kalia Jones MD   ED visits in past 90 days: 0        Note composed:10:14 AM 04/13/2022

## 2022-05-03 DIAGNOSIS — I10 ESSENTIAL HYPERTENSION: ICD-10-CM

## 2022-05-03 NOTE — TELEPHONE ENCOUNTER
No new care gaps identified.  Powered by [employer]. Reference number: [id number].   5/03/2022 5:45:32 AM CDT

## 2022-05-04 RX ORDER — LISINOPRIL 20 MG/1
TABLET ORAL
Qty: 30 TABLET | Refills: 3 | Status: SHIPPED | OUTPATIENT
Start: 2022-05-04 | End: 2022-06-02 | Stop reason: SDUPTHER

## 2022-05-04 NOTE — TELEPHONE ENCOUNTER
Refill Routing Note   Medication(s) are not appropriate for processing by [facility] for the following reason(s):      - Required laboratory values are abnormal    [facility] action(s):  Defer          Medication reconciliation completed: No     Appointments  past 12m or future 3m with PCP    Date Provider   Last Visit   11/23/2021 Kalia Jones MD   Next Visit   Visit date not found Kalia Jones MD   ED visits in past 90 days: 0        Note composed:8:45 PM 05/03/2022

## 2022-06-02 DIAGNOSIS — I10 ESSENTIAL HYPERTENSION: ICD-10-CM

## 2022-06-02 RX ORDER — LISINOPRIL 20 MG/1
20 TABLET ORAL DAILY
Qty: 30 TABLET | Refills: 3 | Status: SHIPPED | OUTPATIENT
Start: 2022-06-02 | End: 2022-08-31 | Stop reason: SDUPTHER

## 2022-06-02 NOTE — TELEPHONE ENCOUNTER
No new care gaps identified.  [facility] Embedded Care Gaps. Reference number: [id number]. 6/02/2022   4:45:25 PM CDT

## 2022-06-02 NOTE — TELEPHONE ENCOUNTER
----- Message from Cassandra Walker sent at 6/2/2022  4:16 PM CDT -----  Regarding: refill  Type:  RX Refill Request    Who Called: Jayla, wife  Refill or New Rx:refill  RX Name and Strength:lisinopriL (PRINIVIL,ZESTRIL) 20 MG tablet  How is the patient currently taking it? (ex. 1XDay):  Is this a 30 day or 90 day RX:90  Preferred Pharmacy with phone number:[facility] #[id number] [address]  Local or Mail Order:local  Ordering Provider  Would the patient rather a call back or a response via [facility]? call  Best Call Back Number:[phone]  Additional Information:

## 2022-07-28 ENCOUNTER — HOSPITAL ENCOUNTER (OUTPATIENT)
Dept: CARDIOLOGY | Facility: OTHER | Age: 87
Discharge: HOME | End: 2022-07-28
Attending: FAMILY MEDICINE
Payer: MEDICARE

## 2022-07-28 ENCOUNTER — OFFICE VISIT (OUTPATIENT)
Dept: INTERNAL MEDICINE | Facility: CLINIC | Age: 87
End: 2022-07-28
Payer: MEDICARE

## 2022-07-28 ENCOUNTER — HOSPITAL ENCOUNTER (OUTPATIENT)
Dept: RADIOLOGY | Facility: OTHER | Age: 87
Discharge: HOME | End: 2022-07-28
Attending: PHYSICIAN ASSISTANT
Payer: MEDICARE

## 2022-07-28 ENCOUNTER — TELEPHONE (OUTPATIENT)
Dept: INTERNAL MEDICINE | Facility: CLINIC | Age: 87
End: 2022-07-28

## 2022-07-28 ENCOUNTER — HOSPITAL ENCOUNTER (OUTPATIENT)
Dept: CARDIOLOGY | Facility: OTHER | Age: 87
Discharge: HOME | End: 2022-07-28
Attending: PHYSICIAN ASSISTANT
Payer: MEDICARE

## 2022-07-28 VITALS
HEART RATE: 65 BPM | SYSTOLIC BLOOD PRESSURE: 122 MMHG | DIASTOLIC BLOOD PRESSURE: 60 MMHG | WEIGHT: 166 LBS | HEIGHT: 63 IN | BODY MASS INDEX: 29.41 KG/M2

## 2022-07-28 VITALS
DIASTOLIC BLOOD PRESSURE: 60 MMHG | HEART RATE: 65 BPM | BODY MASS INDEX: 29.48 KG/M2 | SYSTOLIC BLOOD PRESSURE: 122 MMHG | WEIGHT: 166.44 LBS | OXYGEN SATURATION: 98 %

## 2022-07-28 DIAGNOSIS — R07.9 CHEST PAIN, UNSPECIFIED TYPE: ICD-10-CM

## 2022-07-28 DIAGNOSIS — R07.9 CHEST PAIN, UNSPECIFIED TYPE: Primary | ICD-10-CM

## 2022-07-28 DIAGNOSIS — I51.89 GRADE II DIASTOLIC DYSFUNCTION: Primary | ICD-10-CM

## 2022-07-28 LAB
AV INDEX (PROSTH): 0.58
AV MEAN GRADIENT: 5 MMHG
AV PEAK GRADIENT: 9 MMHG
AV VALVE AREA: 2.1 CM2
AV VELOCITY RATIO: 0.64
BSA FOR ECHO PROCEDURE: 1.83 M2
CV ECHO LV RWT: 0.46 CM
DOP CALC AO PEAK VEL: 1.49 M/S
DOP CALC AO VTI: 39.11 CM
DOP CALC LVOT AREA: 3.6 CM2
DOP CALC LVOT DIAMETER: 2.14 CM
DOP CALC LVOT PEAK VEL: 0.95 M/S
DOP CALC LVOT STROKE VOLUME: 82.11 CM3
DOP CALCLVOT PEAK VEL VTI: 22.84 CM
E WAVE DECELERATION TIME: 243.48 MSEC
E/A RATIO: 3.68
E/E' RATIO: 12.12 M/S
ECHO LV POSTERIOR WALL: 1.08 CM (ref 0.6–1.1)
EJECTION FRACTION: 55 %
FRACTIONAL SHORTENING: 22 % (ref 28–44)
INTERVENTRICULAR SEPTUM: 1.15 CM (ref 0.6–1.1)
IVRT: 96.89 MSEC
LA MAJOR: 6.22 CM
LA MINOR: 6.42 CM
LA WIDTH: 4.59 CM
LEFT ATRIUM SIZE: 4.18 CM
LEFT ATRIUM VOLUME INDEX MOD: 60.9 ML/M2
LEFT ATRIUM VOLUME INDEX: 57.6 ML/M2
LEFT ATRIUM VOLUME MOD: 109 CM3
LEFT ATRIUM VOLUME: 103.04 CM3
LEFT INTERNAL DIMENSION IN SYSTOLE: 3.65 CM (ref 2.1–4)
LEFT VENTRICLE DIASTOLIC VOLUME INDEX: 56.84 ML/M2
LEFT VENTRICLE DIASTOLIC VOLUME: 101.74 ML
LEFT VENTRICLE MASS INDEX: 106 G/M2
LEFT VENTRICLE SYSTOLIC VOLUME INDEX: 31.5 ML/M2
LEFT VENTRICLE SYSTOLIC VOLUME: 56.39 ML
LEFT VENTRICULAR INTERNAL DIMENSION IN DIASTOLE: 4.69 CM (ref 3.5–6)
LEFT VENTRICULAR MASS: 190.47 G
LV LATERAL E/E' RATIO: 11.44 M/S
LV SEPTAL E/E' RATIO: 12.88 M/S
MV PEAK A VEL: 0.28 M/S
MV PEAK E VEL: 1.03 M/S
MV STENOSIS PRESSURE HALF TIME: 70.61 MS
MV VALVE AREA P 1/2 METHOD: 3.12 CM2
PISA MRMAX VEL: 0.05 M/S
PISA TR MAX VEL: 2.8 M/S
PV PEAK VELOCITY: 0.73 CM/S
RA MAJOR: 5.94 CM
RA PRESSURE: 3 MMHG
RIGHT VENTRICULAR END-DIASTOLIC DIMENSION: 3.7 CM
SINUS: 3.66 CM
STJ: 2.17 CM
TDI LATERAL: 0.09 M/S
TDI SEPTAL: 0.08 M/S
TDI: 0.09 M/S
TR MAX PG: 31 MMHG
TRICUSPID ANNULAR PLANE SYSTOLIC EXCURSION: 1.68 CM
TV REST PULMONARY ARTERY PRESSURE: 34 MMHG

## 2022-07-28 PROCEDURE — 1101F PR PT FALLS ASSESS DOC 0-1 FALLS W/OUT INJ PAST YR: ICD-10-PCS | Mod: CPTII,S$GLB,, | Performed by: PHYSICIAN ASSISTANT

## 2022-07-28 PROCEDURE — 71046 X-RAY EXAM CHEST 2 VIEWS: CPT | Mod: 26,,, | Performed by: RADIOLOGY

## 2022-07-28 PROCEDURE — 99999 PR PBB SHADOW E&M-EST. PATIENT-LVL III: CPT | Mod: PBBFAC,,, | Performed by: PHYSICIAN ASSISTANT

## 2022-07-28 PROCEDURE — 1160F RVW MEDS BY RX/DR IN RCRD: CPT | Mod: CPTII,S$GLB,, | Performed by: PHYSICIAN ASSISTANT

## 2022-07-28 PROCEDURE — 1126F AMNT PAIN NOTED NONE PRSNT: CPT | Mod: CPTII,S$GLB,, | Performed by: PHYSICIAN ASSISTANT

## 2022-07-28 PROCEDURE — 1160F PR REVIEW ALL MEDS BY PRESCRIBER/CLIN PHARMACIST DOCUMENTED: ICD-10-PCS | Mod: CPTII,S$GLB,, | Performed by: PHYSICIAN ASSISTANT

## 2022-07-28 PROCEDURE — 99214 PR OFFICE/OUTPT VISIT, EST, LEVL IV, 30-39 MIN: ICD-10-PCS | Mod: S$GLB,,, | Performed by: PHYSICIAN ASSISTANT

## 2022-07-28 PROCEDURE — 1126F PR PAIN SEVERITY QUANTIFIED, NO PAIN PRESENT: ICD-10-PCS | Mod: CPTII,S$GLB,, | Performed by: PHYSICIAN ASSISTANT

## 2022-07-28 PROCEDURE — 1159F PR MEDICATION LIST DOCUMENTED IN MEDICAL RECORD: ICD-10-PCS | Mod: CPTII,S$GLB,, | Performed by: PHYSICIAN ASSISTANT

## 2022-07-28 PROCEDURE — 3288F PR FALLS RISK ASSESSMENT DOCUMENTED: ICD-10-PCS | Mod: CPTII,S$GLB,, | Performed by: PHYSICIAN ASSISTANT

## 2022-07-28 PROCEDURE — 93010 ELECTROCARDIOGRAM REPORT: CPT | Mod: ,,, | Performed by: INTERNAL MEDICINE

## 2022-07-28 PROCEDURE — 93306 TTE W/DOPPLER COMPLETE: CPT

## 2022-07-28 PROCEDURE — 93306 TTE W/DOPPLER COMPLETE: CPT | Mod: 26,,, | Performed by: INTERNAL MEDICINE

## 2022-07-28 PROCEDURE — 1159F MED LIST DOCD IN RCRD: CPT | Mod: CPTII,S$GLB,, | Performed by: PHYSICIAN ASSISTANT

## 2022-07-28 PROCEDURE — 3288F FALL RISK ASSESSMENT DOCD: CPT | Mod: CPTII,S$GLB,, | Performed by: PHYSICIAN ASSISTANT

## 2022-07-28 PROCEDURE — 71046 XR CHEST PA AND LATERAL: ICD-10-PCS | Mod: 26,,, | Performed by: RADIOLOGY

## 2022-07-28 PROCEDURE — 93005 ELECTROCARDIOGRAM TRACING: CPT

## 2022-07-28 PROCEDURE — 93306 ECHO (CUPID ONLY): ICD-10-PCS | Mod: 26,,, | Performed by: INTERNAL MEDICINE

## 2022-07-28 PROCEDURE — 93010 EKG 12-LEAD: ICD-10-PCS | Mod: ,,, | Performed by: INTERNAL MEDICINE

## 2022-07-28 PROCEDURE — 99999 PR PBB SHADOW E&M-EST. PATIENT-LVL III: ICD-10-PCS | Mod: PBBFAC,,, | Performed by: PHYSICIAN ASSISTANT

## 2022-07-28 PROCEDURE — 71046 X-RAY EXAM CHEST 2 VIEWS: CPT | Mod: TC,FY

## 2022-07-28 PROCEDURE — 99214 OFFICE O/P EST MOD 30 MIN: CPT | Mod: S$GLB,,, | Performed by: PHYSICIAN ASSISTANT

## 2022-07-28 PROCEDURE — 1101F PT FALLS ASSESS-DOCD LE1/YR: CPT | Mod: CPTII,S$GLB,, | Performed by: PHYSICIAN ASSISTANT

## 2022-07-28 NOTE — PROGRESS NOTES
INTERNAL MEDICINE URGENT VISIT NOTE    CHIEF COMPLAINT     Chief Complaint   Patient presents with    Chest Pain       HPI     Viktor Townsend Sr. is a 89 y.o. male who presents for an urgent visit today.    PCP is Kalia Jones MD, patient is known to me.     Patient presents with complaints of chest pain that that lasted for two days and is now resolved. He reports that earlier this week he had sudden onset of left sided chest burning pain that was constant. He reports that pain was associated with SOB. He denies any diaphoresis, nausea, vomiting or radiation of the pain. He has no known cardiac history. He denies history of PUD or GERD. He reports that when he awoke yesterday morning all symptoms had resolved.     Past Medical History:  Past Medical History:   Diagnosis Date    Colon polyps     Hiatal hernia     Lumbar back pain     Personal history of asbestosis        Home Medications:  Prior to Admission medications    Medication Sig Start Date End Date Taking? Authorizing Provider   acetaminophen (TYLENOL) 325 MG tablet Take 325 mg by mouth every 6 (six) hours as needed for Pain.   Yes Historical Provider   allopurinoL (ZYLOPRIM) 100 MG tablet TAKE 1 TABLET(300 MG) BY MOUTH EVERY DAY 4/13/22  Yes Kalia Jones MD   calcium carbonate (TUMS) 200 mg calcium (500 mg) chewable tablet Take 1 tablet by mouth 3 (three) times daily as needed for Heartburn.   Yes Historical Provider   cyanocobalamin, vitamin B-12, (VITAMIN B-12 ORAL) Take by mouth.   Yes Historical Provider   hydrocortisone 2.5 % cream Apply topically 2 (two) times daily. 5/7/21  Yes Renetta Rios PA-C   lisinopriL (PRINIVIL,ZESTRIL) 20 MG tablet Take 1 tablet (20 mg total) by mouth once daily. 6/2/22  Yes Kalia Jones MD   pravastatin (PRAVACHOL) 20 MG tablet Take 1 tablet (20 mg total) by mouth once daily. 2/3/22  Yes Kalia Jones MD       Review of Systems:  Review of Systems   Constitutional:  Negative for chills and fever.   HENT: Negative for sore throat and trouble swallowing.    Eyes: Negative for visual disturbance.   Respiratory: Negative for cough, chest tightness and shortness of breath.    Cardiovascular: Positive for chest pain.   Gastrointestinal: Negative for abdominal pain, constipation, diarrhea, nausea and vomiting.   Genitourinary: Negative for dysuria and flank pain.   Musculoskeletal: Negative for back pain, neck pain and neck stiffness.   Skin: Negative for rash.   Neurological: Negative for dizziness, syncope, weakness and headaches.   Psychiatric/Behavioral: Negative for confusion.       Health Maintainence:   Immunizations:  Health Maintenance       Date Due Completion Date    COVID-19 Vaccine (4 - Booster for Pfizer series) 03/29/2022 11/29/2021    Influenza Vaccine (1) 09/01/2022 11/23/2021    Lipid Panel 12/08/2025 12/8/2020    TETANUS VACCINE 08/08/2026 8/8/2016           PHYSICAL EXAM     BP (!) 144/78 (BP Location: Right arm, Patient Position: Sitting)   Pulse 65   Wt 75.5 kg (166 lb 7.2 oz)   SpO2 98%   BMI 29.48 kg/m²     Physical Exam  Vitals and nursing note reviewed.   Constitutional:       Appearance: Normal appearance.      Comments: Healthy appearing male in NAD or apparent pain. He makes good eye contact, speaks in clear full sentences and ambulates with ease.      HENT:      Head: Normocephalic and atraumatic.      Nose: Nose normal.      Mouth/Throat:      Pharynx: Oropharynx is clear.   Eyes:      Conjunctiva/sclera: Conjunctivae normal.   Cardiovascular:      Rate and Rhythm: Normal rate and regular rhythm.      Pulses: Normal pulses.      Comments: No reproducible chest pain  Regularly irregular rhythm on cardiac auscultation   Pulmonary:      Effort: No respiratory distress.      Comments: Lungs are CTA    Abdominal:      Tenderness: There is no abdominal tenderness.   Musculoskeletal:         General: Normal range of motion.      Cervical back: No rigidity.    Skin:     General: Skin is warm and dry.      Capillary Refill: Capillary refill takes less than 2 seconds.      Findings: No rash.   Neurological:      General: No focal deficit present.      Mental Status: He is alert.      Gait: Gait normal.   Psychiatric:         Mood and Affect: Mood normal.         LABS     Lab Results   Component Value Date    HGBA1C 5.1 12/08/2020     CMP  Sodium   Date Value Ref Range Status   03/03/2022 141 136 - 145 mmol/L Final     Potassium   Date Value Ref Range Status   03/03/2022 3.6 3.5 - 5.1 mmol/L Final     Chloride   Date Value Ref Range Status   03/03/2022 106 95 - 110 mmol/L Final     CO2   Date Value Ref Range Status   03/03/2022 25 23 - 29 mmol/L Final     Glucose   Date Value Ref Range Status   03/03/2022 96 70 - 110 mg/dL Final     BUN   Date Value Ref Range Status   03/03/2022 23 8 - 23 mg/dL Final     Creatinine   Date Value Ref Range Status   03/03/2022 1.7 (H) 0.5 - 1.4 mg/dL Final     Calcium   Date Value Ref Range Status   03/03/2022 9.7 8.7 - 10.5 mg/dL Final     Total Protein   Date Value Ref Range Status   12/08/2020 6.7 6.0 - 8.4 g/dL Final     Albumin   Date Value Ref Range Status   03/03/2022 3.7 3.5 - 5.2 g/dL Final     Total Bilirubin   Date Value Ref Range Status   12/08/2020 1.1 (H) 0.1 - 1.0 mg/dL Final     Comment:     For infants and newborns, interpretation of results should be based  on gestational age, weight and in agreement with clinical  observations.  Premature Infant recommended reference ranges:  Up to 24 hours.............<8.0 mg/dL  Up to 48 hours............<12.0 mg/dL  3-5 days..................<15.0 mg/dL  6-29 days.................<15.0 mg/dL       Alkaline Phosphatase   Date Value Ref Range Status   12/08/2020 78 55 - 135 U/L Final     AST   Date Value Ref Range Status   12/08/2020 19 10 - 40 U/L Final     ALT   Date Value Ref Range Status   12/08/2020 9 (L) 10 - 44 U/L Final     Anion Gap   Date Value Ref Range Status   03/03/2022 10 8  - 16 mmol/L Final     eGFR if    Date Value Ref Range Status   03/03/2022 41 (A) >60 mL/min/1.73 m^2 Final     eGFR if non    Date Value Ref Range Status   03/03/2022 35 (A) >60 mL/min/1.73 m^2 Final     Comment:     Calculation used to obtain the estimated glomerular filtration  rate (eGFR) is the CKD-EPI equation.        Lab Results   Component Value Date    WBC 6.46 03/03/2022    HGB 14.3 03/03/2022    HCT 44.7 03/03/2022    MCV 91 03/03/2022     03/03/2022     Lab Results   Component Value Date    CHOL 129 12/08/2020    CHOL 142 02/06/2017    CHOL 155 02/15/2016     Lab Results   Component Value Date    HDL 49 12/08/2020    HDL 41 02/06/2017    HDL 42 02/15/2016     Lab Results   Component Value Date    LDLCALC 58.8 (L) 12/08/2020    LDLCALC 76.0 02/06/2017    LDLCALC 85.0 02/15/2016     Lab Results   Component Value Date    TRIG 106 12/08/2020    TRIG 125 02/06/2017    TRIG 140 02/15/2016     Lab Results   Component Value Date    CHOLHDL 38.0 12/08/2020    CHOLHDL 28.9 02/06/2017    CHOLHDL 27.1 02/15/2016     Lab Results   Component Value Date    TSH 1.612 12/08/2020       ASSESSMENT/PLAN     Viktor Townsend . is a 89 y.o. male     Viktor was seen today for chest pain. Atypical in nature, will start outpatient work-up. He is aware that if symptoms return and worsen he should report to the ER for emergent eval.     Diagnoses and all orders for this visit:    Chest pain, unspecified type  -     EKG 12-lead; Future  -     Echo; Future  -     BNP; Future  -     X-Ray Chest PA And Lateral; Future  -     Comprehensive Metabolic Panel; Future     Patient was counseled on when and how to seek emergent care.       Renetta Rios PA-C   Department of Internal Medicine - [facility]   11:15 AM

## 2022-07-28 NOTE — TELEPHONE ENCOUNTER
Called and spoke to pt and wife and relayed provider message: I have recently placed a referral order in to Cardiology. I would like Mr. Townsend be seen by cards ASAP for his CP and SOB. Please call him to make sure he is aware of this plan. Please help him with scheduling. Thanks ACM     Pt scheduled to see Cardiology at [facility] tomorrow AM.

## 2022-07-29 ENCOUNTER — OFFICE VISIT (OUTPATIENT)
Dept: CARDIOLOGY | Facility: CLINIC | Age: 87
End: 2022-07-29
Payer: MEDICARE

## 2022-07-29 VITALS
HEIGHT: 63 IN | DIASTOLIC BLOOD PRESSURE: 72 MMHG | BODY MASS INDEX: 29.29 KG/M2 | SYSTOLIC BLOOD PRESSURE: 130 MMHG | OXYGEN SATURATION: 98 % | HEART RATE: 63 BPM | WEIGHT: 165.31 LBS

## 2022-07-29 DIAGNOSIS — I51.89 GRADE II DIASTOLIC DYSFUNCTION: ICD-10-CM

## 2022-07-29 DIAGNOSIS — N18.30 STAGE 3 CHRONIC KIDNEY DISEASE, UNSPECIFIED WHETHER STAGE 3A OR 3B CKD: ICD-10-CM

## 2022-07-29 DIAGNOSIS — I10 ESSENTIAL HYPERTENSION: ICD-10-CM

## 2022-07-29 DIAGNOSIS — E78.00 PURE HYPERCHOLESTEROLEMIA: Primary | ICD-10-CM

## 2022-07-29 PROCEDURE — 1159F MED LIST DOCD IN RCRD: CPT | Mod: CPTII,S$GLB,, | Performed by: INTERNAL MEDICINE

## 2022-07-29 PROCEDURE — 1101F PT FALLS ASSESS-DOCD LE1/YR: CPT | Mod: CPTII,S$GLB,, | Performed by: INTERNAL MEDICINE

## 2022-07-29 PROCEDURE — 1159F PR MEDICATION LIST DOCUMENTED IN MEDICAL RECORD: ICD-10-PCS | Mod: CPTII,S$GLB,, | Performed by: INTERNAL MEDICINE

## 2022-07-29 PROCEDURE — 3288F PR FALLS RISK ASSESSMENT DOCUMENTED: ICD-10-PCS | Mod: CPTII,S$GLB,, | Performed by: INTERNAL MEDICINE

## 2022-07-29 PROCEDURE — 99999 PR PBB SHADOW E&M-EST. PATIENT-LVL III: ICD-10-PCS | Mod: PBBFAC,,, | Performed by: INTERNAL MEDICINE

## 2022-07-29 PROCEDURE — 1126F PR PAIN SEVERITY QUANTIFIED, NO PAIN PRESENT: ICD-10-PCS | Mod: CPTII,S$GLB,, | Performed by: INTERNAL MEDICINE

## 2022-07-29 PROCEDURE — 1101F PR PT FALLS ASSESS DOC 0-1 FALLS W/OUT INJ PAST YR: ICD-10-PCS | Mod: CPTII,S$GLB,, | Performed by: INTERNAL MEDICINE

## 2022-07-29 PROCEDURE — 99999 PR PBB SHADOW E&M-EST. PATIENT-LVL III: CPT | Mod: PBBFAC,,, | Performed by: INTERNAL MEDICINE

## 2022-07-29 PROCEDURE — 99204 PR OFFICE/OUTPT VISIT, NEW, LEVL IV, 45-59 MIN: ICD-10-PCS | Mod: S$GLB,,, | Performed by: INTERNAL MEDICINE

## 2022-07-29 PROCEDURE — 3288F FALL RISK ASSESSMENT DOCD: CPT | Mod: CPTII,S$GLB,, | Performed by: INTERNAL MEDICINE

## 2022-07-29 PROCEDURE — 1126F AMNT PAIN NOTED NONE PRSNT: CPT | Mod: CPTII,S$GLB,, | Performed by: INTERNAL MEDICINE

## 2022-07-29 PROCEDURE — 99204 OFFICE O/P NEW MOD 45 MIN: CPT | Mod: S$GLB,,, | Performed by: INTERNAL MEDICINE

## 2022-07-29 NOTE — PROGRESS NOTES
Cardiology    7/29/2022  8:25 AM    Problem list  Patient Active Problem List   Diagnosis    Essential hypertension    Personal history of prostate cancer    Colon polyps    Hoarseness    Hiatal hernia    Gout, unspecified    Trigger finger, acquired    Diverticulosis    Hyperlipidemia    Prediabetes    CKD (chronic kidney disease) stage 3, GFR 30-59 ml/min    Left inguinal hernia    Right shoulder pain    Hypertensive kidney disease with stage 3 chronic kidney disease    Paraproteinemia    MGUS (monoclonal gammopathy of unknown significance)    Anemia    B12 deficiency       CC:  CP    HPI:  Referred for evaluation of chest pain.  He was seen by his primary care yesterday.  Last week she had to day of constant left-sided chest pain that was worse when he coughs or when he yawns.  It was relieved spontaneously.  The pain was not worse with exertion.  He denies any prior cardiac problems.  He does not smoke or drink.  There is no family history for premature coronary disease.  He walks with a cane.  He tries to stay active by working in his yd.    Medications  Current Outpatient Medications   Medication Sig Dispense Refill    acetaminophen (TYLENOL) 325 MG tablet Take 325 mg by mouth every 6 (six) hours as needed for Pain.      allopurinoL (ZYLOPRIM) 100 MG tablet TAKE 1 TABLET(300 MG) BY MOUTH EVERY DAY 90 tablet 3    calcium carbonate (TUMS) 200 mg calcium (500 mg) chewable tablet Take 1 tablet by mouth 3 (three) times daily as needed for Heartburn.      cyanocobalamin, vitamin B-12, (VITAMIN B-12 ORAL) Take by mouth.      hydrocortisone 2.5 % cream Apply topically 2 (two) times daily. 28 g 0    lisinopriL (PRINIVIL,ZESTRIL) 20 MG tablet Take 1 tablet (20 mg total) by mouth once daily. 30 tablet 3    pravastatin (PRAVACHOL) 20 MG tablet Take 1 tablet (20 mg total) by mouth once daily. 90 tablet 3     No current facility-administered medications for this visit.      Prior to  Admission medications    Medication Sig Start Date End Date Taking? Authorizing Provider   acetaminophen (TYLENOL) 325 MG tablet Take 325 mg by mouth every 6 (six) hours as needed for Pain.   Yes Historical Provider   allopurinoL (ZYLOPRIM) 100 MG tablet TAKE 1 TABLET(300 MG) BY MOUTH EVERY DAY 4/13/22  Yes Kalia Jones MD   calcium carbonate (TUMS) 200 mg calcium (500 mg) chewable tablet Take 1 tablet by mouth 3 (three) times daily as needed for Heartburn.   Yes Historical Provider   cyanocobalamin, vitamin B-12, (VITAMIN B-12 ORAL) Take by mouth.   Yes Historical Provider   hydrocortisone 2.5 % cream Apply topically 2 (two) times daily. 5/7/21  Yes Renetta Rios PA-C   lisinopriL (PRINIVIL,ZESTRIL) 20 MG tablet Take 1 tablet (20 mg total) by mouth once daily. 6/2/22  Yes Kalia Jones MD   pravastatin (PRAVACHOL) 20 MG tablet Take 1 tablet (20 mg total) by mouth once daily. 2/3/22  Yes Kalia Jones MD         History  Past Medical History:   Diagnosis Date    Colon polyps     Hiatal hernia     Lumbar back pain     Personal history of asbestosis      Past Surgical History:   Procedure Laterality Date    CHOLECYSTECTOMY      EYE SURGERY      PROSTATE SURGERY       Social History     Socioeconomic History    Marital status:    Occupational History    Occupation: Retired    Tobacco Use    Smoking status: Former Smoker    Smokeless tobacco: Never Used   Substance and Sexual Activity    Alcohol use: No     Alcohol/week: 0.0 standard drinks    Drug use: No    Sexual activity: Yes     Partners: Female   Social History Narrative    Lives w/wife.  One adult son also lives at home.  Two adult daughters live out of state.           Allergies  Review of patient's allergies indicates:  No Known Allergies      Review of Systems   Review of Systems   Constitutional: Negative for decreased appetite, fever and weight loss.   HENT: Negative for congestion and  nosebleeds.    Eyes: Negative for double vision, vision loss in left eye, vision loss in right eye and visual disturbance.   Cardiovascular: Negative for chest pain, claudication, cyanosis, dyspnea on exertion, irregular heartbeat, leg swelling, near-syncope, orthopnea, palpitations, paroxysmal nocturnal dyspnea and syncope.   Respiratory: Negative for cough, hemoptysis, shortness of breath, sleep disturbances due to breathing, snoring, sputum production and wheezing.    Endocrine: Negative for cold intolerance and heat intolerance.   Skin: Negative for nail changes and rash.   Musculoskeletal: Negative for joint pain, muscle cramps, muscle weakness and myalgias.   Gastrointestinal: Negative for change in bowel habit, heartburn, hematemesis, hematochezia, hemorrhoids and melena.   Neurological: Negative for dizziness, focal weakness and headaches.         Physical Exam  Wt Readings from Last 1 Encounters:   07/29/22 75 kg (165 lb 4.8 oz)     BP Readings from Last 3 Encounters:   07/29/22 130/72   07/28/22 122/60   07/28/22 122/60     Pulse Readings from Last 1 Encounters:   07/29/22 63     Body mass index is 29.28 kg/m².    Physical Exam  Constitutional:       Appearance: He is well-developed.   HENT:      Head: Atraumatic.   Eyes:      General: No scleral icterus.  Neck:      Vascular: Normal carotid pulses. No carotid bruit, hepatojugular reflux or JVD.   Cardiovascular:      Rate and Rhythm: Normal rate and regular rhythm.      Chest Wall: PMI is not displaced.      Pulses: Intact distal pulses.           Carotid pulses are 2+ on the right side and 2+ on the left side.       Radial pulses are 2+ on the right side and 2+ on the left side.        Dorsalis pedis pulses are 2+ on the right side and 2+ on the left side.      Heart sounds: Normal heart sounds, S1 normal and S2 normal. No murmur heard.    No friction rub.   Pulmonary:      Effort: Pulmonary effort is normal. No respiratory distress.      Breath sounds:  Normal breath sounds. No stridor. No wheezing or rales.   Chest:      Chest wall: No tenderness.   Abdominal:      General: Bowel sounds are normal.      Palpations: Abdomen is soft.   Musculoskeletal:      Cervical back: Neck supple. No edema.   Skin:     General: Skin is warm and dry.      Nails: There is no clubbing.   Neurological:      Mental Status: He is alert and oriented to person, place, and time.   Psychiatric:         Behavior: Behavior normal.         Thought Content: Thought content normal.             Assessment  1. Grade II diastolic dysfunction  Stable  - Ambulatory referral/consult to Cardiology    2. Pure hypercholesterolemia  Stable    3. Essential hypertension  Controlled    4. Stage 3 chronic kidney disease, unspecified whether stage 3a or 3b CKD  Unchanged        Plan and Discussion  Discussed that his chest pain is atypical and more likely musculoskeletal.  Discussed his echocardiogram which showed normal left ventricular function.  He has no unstable cardiac symptoms.  Will continue with current medical management.  No further cardiac testing.    Follow Up  FÉLIXN      Albin Mercedes MD, F.A.C.C, F.S.C.A.I.

## 2022-08-31 DIAGNOSIS — I10 ESSENTIAL HYPERTENSION: ICD-10-CM

## 2022-08-31 RX ORDER — LISINOPRIL 20 MG/1
20 TABLET ORAL DAILY
Qty: 90 TABLET | Refills: 1 | Status: SHIPPED | OUTPATIENT
Start: 2022-08-31 | End: 2023-03-28 | Stop reason: SDUPTHER

## 2022-08-31 NOTE — TELEPHONE ENCOUNTER
Care Due:                  Date            Visit Type   Department     Provider  --------------------------------------------------------------------------------                                EP -                              PRIMARY      [facility] INTERNAL  Kalia Gupta  Last Visit: 11-      [facility] ([facility])   [facility]  Next Visit: None Scheduled  None         None Found                                                            Last  Test          Frequency    Reason                     Performed    Due Date  --------------------------------------------------------------------------------    Office Visit  12 months..  allopurinoL, lisinopriL,   11- 11-                             pravastatin..............    Lipid Panel.  12 months..  pravastatin..............  12- 12-    Uric Acid...  12 months..  allopurinoL..............  Not Found    Overdue    [employer] Embedded Care Gaps. Reference number: [id number]. 8/31/2022   9:44:41 AM CDT

## 2022-08-31 NOTE — TELEPHONE ENCOUNTER
Refill Routing Note   Medication(s) are not appropriate for processing by [facility] for the following reason(s):      - Required laboratory values are abnormal    [facility] action(s):  Defer Medication-related problems identified:   Requires labs  Requires appointment        Medication reconciliation completed: No     Appointments  past 12m or future 3m with PCP    Date Provider   Last Visit   11/23/2021 Kalia Jones MD   Next Visit   Visit date not found Kalia Jones MD   ED visits in past 90 days: 0        Note composed:2:44 PM 08/31/2022

## 2022-11-25 ENCOUNTER — IMMUNIZATION (OUTPATIENT)
Dept: PHARMACY | Facility: CLINIC | Age: 87
End: 2022-11-25
Payer: MEDICARE

## 2022-11-28 ENCOUNTER — OFFICE VISIT (OUTPATIENT)
Dept: INTERNAL MEDICINE | Facility: CLINIC | Age: 87
End: 2022-11-28
Attending: FAMILY MEDICINE
Payer: MEDICARE

## 2022-11-28 VITALS
SYSTOLIC BLOOD PRESSURE: 138 MMHG | OXYGEN SATURATION: 99 % | HEIGHT: 63 IN | WEIGHT: 165.75 LBS | DIASTOLIC BLOOD PRESSURE: 80 MMHG | HEART RATE: 76 BPM | BODY MASS INDEX: 29.37 KG/M2

## 2022-11-28 DIAGNOSIS — E78.00 PURE HYPERCHOLESTEROLEMIA: ICD-10-CM

## 2022-11-28 DIAGNOSIS — I12.9 HYPERTENSIVE KIDNEY DISEASE WITH STAGE 3 CHRONIC KIDNEY DISEASE, UNSPECIFIED WHETHER STAGE 3A OR 3B CKD: ICD-10-CM

## 2022-11-28 DIAGNOSIS — K44.9 HIATAL HERNIA: ICD-10-CM

## 2022-11-28 DIAGNOSIS — N18.31 STAGE 3A CHRONIC KIDNEY DISEASE: Primary | ICD-10-CM

## 2022-11-28 DIAGNOSIS — N18.30 HYPERTENSIVE KIDNEY DISEASE WITH STAGE 3 CHRONIC KIDNEY DISEASE, UNSPECIFIED WHETHER STAGE 3A OR 3B CKD: ICD-10-CM

## 2022-11-28 PROCEDURE — 1100F PTFALLS ASSESS-DOCD GE2>/YR: CPT | Mod: CPTII,S$GLB,, | Performed by: FAMILY MEDICINE

## 2022-11-28 PROCEDURE — 99499 UNLISTED E&M SERVICE: CPT | Mod: S$GLB,,, | Performed by: FAMILY MEDICINE

## 2022-11-28 PROCEDURE — 1159F PR MEDICATION LIST DOCUMENTED IN MEDICAL RECORD: ICD-10-PCS | Mod: CPTII,S$GLB,, | Performed by: FAMILY MEDICINE

## 2022-11-28 PROCEDURE — 1160F PR REVIEW ALL MEDS BY PRESCRIBER/CLIN PHARMACIST DOCUMENTED: ICD-10-PCS | Mod: CPTII,S$GLB,, | Performed by: FAMILY MEDICINE

## 2022-11-28 PROCEDURE — 1126F PR PAIN SEVERITY QUANTIFIED, NO PAIN PRESENT: ICD-10-PCS | Mod: CPTII,S$GLB,, | Performed by: FAMILY MEDICINE

## 2022-11-28 PROCEDURE — 1159F MED LIST DOCD IN RCRD: CPT | Mod: CPTII,S$GLB,, | Performed by: FAMILY MEDICINE

## 2022-11-28 PROCEDURE — 99214 PR OFFICE/OUTPT VISIT, EST, LEVL IV, 30-39 MIN: ICD-10-PCS | Mod: S$GLB,,, | Performed by: FAMILY MEDICINE

## 2022-11-28 PROCEDURE — 3288F PR FALLS RISK ASSESSMENT DOCUMENTED: ICD-10-PCS | Mod: CPTII,S$GLB,, | Performed by: FAMILY MEDICINE

## 2022-11-28 PROCEDURE — 99999 PR PBB SHADOW E&M-EST. PATIENT-LVL III: CPT | Mod: PBBFAC,,, | Performed by: FAMILY MEDICINE

## 2022-11-28 PROCEDURE — 1160F RVW MEDS BY RX/DR IN RCRD: CPT | Mod: CPTII,S$GLB,, | Performed by: FAMILY MEDICINE

## 2022-11-28 PROCEDURE — 3288F FALL RISK ASSESSMENT DOCD: CPT | Mod: CPTII,S$GLB,, | Performed by: FAMILY MEDICINE

## 2022-11-28 PROCEDURE — 99999 PR PBB SHADOW E&M-EST. PATIENT-LVL III: ICD-10-PCS | Mod: PBBFAC,,, | Performed by: FAMILY MEDICINE

## 2022-11-28 PROCEDURE — 1126F AMNT PAIN NOTED NONE PRSNT: CPT | Mod: CPTII,S$GLB,, | Performed by: FAMILY MEDICINE

## 2022-11-28 PROCEDURE — 1100F PR PT FALLS ASSESS DOC 2+ FALLS/FALL W/INJURY/YR: ICD-10-PCS | Mod: CPTII,S$GLB,, | Performed by: FAMILY MEDICINE

## 2022-11-28 PROCEDURE — 99214 OFFICE O/P EST MOD 30 MIN: CPT | Mod: S$GLB,,, | Performed by: FAMILY MEDICINE

## 2022-11-28 PROCEDURE — 99499 RISK ADDL DX/OHS AUDIT: ICD-10-PCS | Mod: S$GLB,,, | Performed by: FAMILY MEDICINE

## 2022-11-28 RX ORDER — ERGOCALCIFEROL 1.25 MG/1
50000 CAPSULE ORAL
COMMUNITY

## 2022-12-05 NOTE — TELEPHONE ENCOUNTER
----- Message from Berta Michele sent at 1/22/2018  3:59 PM CST -----  Contact: Sonali with Dr. Jones's office  x  1st Request  _  2nd Request  _  3rd Request        Who: Sonali with Dr. Jones's office    Why: Requesting a call back in regards to pt possibly has a fractured rt shoulder and needs to be seen sooner than the current appt on 02/28.    What Number to Call Back:  [phone]    When to Expect a call back: (Within 24 hours)    Please return the call at earliest convenience. Thanks!                            
Spoke to patient's wife. Scheduled appt for 1/25 @9am. Will call radiology scheduling in AM to schedule Xray for 8am .   
No

## 2022-12-07 ENCOUNTER — IMMUNIZATION (OUTPATIENT)
Dept: INTERNAL MEDICINE | Facility: CLINIC | Age: 87
End: 2022-12-07
Payer: MEDICARE

## 2022-12-07 DIAGNOSIS — Z23 NEED FOR VACCINATION: Primary | ICD-10-CM

## 2022-12-07 PROCEDURE — 91312 COVID-19, MRNA, LNP-S, BIVALENT BOOSTER, PF, 30 MCG/0.3 ML DOSE: CPT | Mod: S$GLB,,, | Performed by: INTERNAL MEDICINE

## 2022-12-07 PROCEDURE — 91312 COVID-19, MRNA, LNP-S, BIVALENT BOOSTER, PF, 30 MCG/0.3 ML DOSE: ICD-10-PCS | Mod: S$GLB,,, | Performed by: INTERNAL MEDICINE

## 2022-12-07 PROCEDURE — 0124A COVID-19, MRNA, LNP-S, BIVALENT BOOSTER, PF, 30 MCG/0.3 ML DOSE: CPT | Mod: PBBFAC | Performed by: INTERNAL MEDICINE

## 2023-03-28 DIAGNOSIS — I10 ESSENTIAL HYPERTENSION: ICD-10-CM

## 2023-03-28 DIAGNOSIS — E78.00 PURE HYPERCHOLESTEROLEMIA: ICD-10-CM

## 2023-03-28 NOTE — TELEPHONE ENCOUNTER
Care Due:                  Date            Visit Type   Department     Provider  --------------------------------------------------------------------------------                                EP -                              [facility]  Kalia Gupta  Last Visit: 11-      CARE ([facility])   MEDICINE       [place]                              EP -                              [facility]  Next Visit: 04-      [facility]                                                            Last  Test          Frequency    Reason                     Performed    Due Date  --------------------------------------------------------------------------------    CBC.........  12 months..  allopurinoL..............  03- 02-    Lipid Panel.  12 months..  pravastatin..............  Not Found    Overdue    Uric Acid...  12 months..  allopurinoL..............  Not Found    Overdue    [employer] Embedded Care Gaps. Reference number: [id number]. 3/28/2023   4:43:26 PM CDT

## 2023-03-28 NOTE — TELEPHONE ENCOUNTER
----- Message from Khushi Chávez sent at 3/28/2023  3:35 PM CDT -----  Regarding: Mediction  Refill  Request                Reply in [facility]: NO      Please refill the medication(s) listed below. Please call the patient :  [phone] (E)         Medication   pravastatin (PRAVACHOL) 20 MG tablet                       Sig - Route: Take 1 tablet (20 mg total) by mouth once daily. - Oral                       Dispense:  90 tablet                        Medication   lisinopriL (PRINIVIL,ZESTRIL) 20 MG tablet                       Sig - Route: Take 1 tablet (20 mg total) by mouth once daily. - Oral                       Dispense:  90 Tablets      Preferred Pharmacy:  [facility] #[id number] [address]   Phone: [phone]  Fax:  [phone]

## 2023-03-29 RX ORDER — PRAVASTATIN SODIUM 20 MG/1
20 TABLET ORAL DAILY
Qty: 90 TABLET | Refills: 3 | Status: SHIPPED | OUTPATIENT
Start: 2023-03-29 | End: 2024-02-06 | Stop reason: SDUPTHER

## 2023-03-29 RX ORDER — LISINOPRIL 20 MG/1
20 TABLET ORAL DAILY
Qty: 90 TABLET | Refills: 1 | Status: SHIPPED | OUTPATIENT
Start: 2023-03-29 | End: 2023-07-14 | Stop reason: SDUPTHER

## 2023-04-04 ENCOUNTER — PES CALL (OUTPATIENT)
Dept: ADMINISTRATIVE | Facility: CLINIC | Age: 88
End: 2023-04-04
Payer: MEDICARE

## 2023-04-19 ENCOUNTER — OFFICE VISIT (OUTPATIENT)
Dept: INTERNAL MEDICINE | Facility: CLINIC | Age: 88
End: 2023-04-19
Attending: FAMILY MEDICINE
Payer: MEDICARE

## 2023-04-19 ENCOUNTER — LAB VISIT (OUTPATIENT)
Dept: LAB | Facility: OTHER | Age: 88
End: 2023-04-19
Attending: FAMILY MEDICINE
Payer: MEDICARE

## 2023-04-19 VITALS
HEART RATE: 70 BPM | OXYGEN SATURATION: 99 % | BODY MASS INDEX: 27.8 KG/M2 | WEIGHT: 156.88 LBS | SYSTOLIC BLOOD PRESSURE: 138 MMHG | HEIGHT: 63 IN | DIASTOLIC BLOOD PRESSURE: 60 MMHG

## 2023-04-19 DIAGNOSIS — I70.0 AORTIC ATHEROSCLEROSIS: ICD-10-CM

## 2023-04-19 DIAGNOSIS — I12.9 HYPERTENSIVE KIDNEY DISEASE WITH STAGE 3 CHRONIC KIDNEY DISEASE, UNSPECIFIED WHETHER STAGE 3A OR 3B CKD: ICD-10-CM

## 2023-04-19 DIAGNOSIS — E78.00 PURE HYPERCHOLESTEROLEMIA: ICD-10-CM

## 2023-04-19 DIAGNOSIS — N18.30 HYPERTENSIVE KIDNEY DISEASE WITH STAGE 3 CHRONIC KIDNEY DISEASE, UNSPECIFIED WHETHER STAGE 3A OR 3B CKD: Primary | ICD-10-CM

## 2023-04-19 DIAGNOSIS — N18.30 HYPERTENSIVE KIDNEY DISEASE WITH STAGE 3 CHRONIC KIDNEY DISEASE, UNSPECIFIED WHETHER STAGE 3A OR 3B CKD: ICD-10-CM

## 2023-04-19 DIAGNOSIS — N18.31 STAGE 3A CHRONIC KIDNEY DISEASE: ICD-10-CM

## 2023-04-19 DIAGNOSIS — G95.9 DISEASE OF SPINAL CORD, UNSPECIFIED: ICD-10-CM

## 2023-04-19 DIAGNOSIS — I12.9 HYPERTENSIVE KIDNEY DISEASE WITH STAGE 3 CHRONIC KIDNEY DISEASE, UNSPECIFIED WHETHER STAGE 3A OR 3B CKD: Primary | ICD-10-CM

## 2023-04-19 LAB
ALBUMIN SERPL BCP-MCNC: 3.8 G/DL (ref 3.5–5.2)
ALP SERPL-CCNC: 103 U/L (ref 55–135)
ALT SERPL W/O P-5'-P-CCNC: 11 U/L (ref 10–44)
ANION GAP SERPL CALC-SCNC: 10 MMOL/L (ref 8–16)
AST SERPL-CCNC: 15 U/L (ref 10–40)
BILIRUB SERPL-MCNC: 1.5 MG/DL (ref 0.1–1)
BUN SERPL-MCNC: 23 MG/DL (ref 8–23)
CALCIUM SERPL-MCNC: 9.8 MG/DL (ref 8.7–10.5)
CHLORIDE SERPL-SCNC: 108 MMOL/L (ref 95–110)
CO2 SERPL-SCNC: 26 MMOL/L (ref 23–29)
CREAT SERPL-MCNC: 1.7 MG/DL (ref 0.5–1.4)
EST. GFR  (NO RACE VARIABLE): 38 ML/MIN/1.73 M^2
GLUCOSE SERPL-MCNC: 91 MG/DL (ref 70–110)
POTASSIUM SERPL-SCNC: 3.6 MMOL/L (ref 3.5–5.1)
PROT SERPL-MCNC: 7.3 G/DL (ref 6–8.4)
SODIUM SERPL-SCNC: 144 MMOL/L (ref 136–145)

## 2023-04-19 PROCEDURE — 1100F PR PT FALLS ASSESS DOC 2+ FALLS/FALL W/INJURY/YR: ICD-10-PCS | Mod: CPTII,S$GLB,, | Performed by: FAMILY MEDICINE

## 2023-04-19 PROCEDURE — 99999 PR PBB SHADOW E&M-EST. PATIENT-LVL III: ICD-10-PCS | Mod: PBBFAC,,, | Performed by: FAMILY MEDICINE

## 2023-04-19 PROCEDURE — 1160F PR REVIEW ALL MEDS BY PRESCRIBER/CLIN PHARMACIST DOCUMENTED: ICD-10-PCS | Mod: CPTII,S$GLB,, | Performed by: FAMILY MEDICINE

## 2023-04-19 PROCEDURE — 99214 PR OFFICE/OUTPT VISIT, EST, LEVL IV, 30-39 MIN: ICD-10-PCS | Mod: S$GLB,,, | Performed by: FAMILY MEDICINE

## 2023-04-19 PROCEDURE — 1159F MED LIST DOCD IN RCRD: CPT | Mod: CPTII,S$GLB,, | Performed by: FAMILY MEDICINE

## 2023-04-19 PROCEDURE — 36415 COLL VENOUS BLD VENIPUNCTURE: CPT | Performed by: FAMILY MEDICINE

## 2023-04-19 PROCEDURE — 1159F PR MEDICATION LIST DOCUMENTED IN MEDICAL RECORD: ICD-10-PCS | Mod: CPTII,S$GLB,, | Performed by: FAMILY MEDICINE

## 2023-04-19 PROCEDURE — 99214 OFFICE O/P EST MOD 30 MIN: CPT | Mod: S$GLB,,, | Performed by: FAMILY MEDICINE

## 2023-04-19 PROCEDURE — 1100F PTFALLS ASSESS-DOCD GE2>/YR: CPT | Mod: CPTII,S$GLB,, | Performed by: FAMILY MEDICINE

## 2023-04-19 PROCEDURE — 3288F PR FALLS RISK ASSESSMENT DOCUMENTED: ICD-10-PCS | Mod: CPTII,S$GLB,, | Performed by: FAMILY MEDICINE

## 2023-04-19 PROCEDURE — 1125F PR PAIN SEVERITY QUANTIFIED, PAIN PRESENT: ICD-10-PCS | Mod: CPTII,S$GLB,, | Performed by: FAMILY MEDICINE

## 2023-04-19 PROCEDURE — 1125F AMNT PAIN NOTED PAIN PRSNT: CPT | Mod: CPTII,S$GLB,, | Performed by: FAMILY MEDICINE

## 2023-04-19 PROCEDURE — 3288F FALL RISK ASSESSMENT DOCD: CPT | Mod: CPTII,S$GLB,, | Performed by: FAMILY MEDICINE

## 2023-04-19 PROCEDURE — 80053 COMPREHEN METABOLIC PANEL: CPT | Performed by: FAMILY MEDICINE

## 2023-04-19 PROCEDURE — 99999 PR PBB SHADOW E&M-EST. PATIENT-LVL III: CPT | Mod: PBBFAC,,, | Performed by: FAMILY MEDICINE

## 2023-04-19 PROCEDURE — 1160F RVW MEDS BY RX/DR IN RCRD: CPT | Mod: CPTII,S$GLB,, | Performed by: FAMILY MEDICINE

## 2023-04-19 NOTE — PROGRESS NOTES
"Subjective:       Patient ID: Viktor Townsend Sr. is a 89 y.o. male.     Chief Complaint:  Annual exam     Mr. Townsend is an 88 y/o M with a PMH of gout and HTN who has presented today for his annual exam. He feels good.    He has had no recurrent flare-ups in his right hand, his left elbow, and his left great toe since starting allopurinol. He takes allopurinol every day.     Mr. Townsend's blood pressure is well controlled at this itme.      Review of Systems   Constitutional: Negative for activity change, appetite change, chills and fever.   HENT: Negative for congestion, rhinorrhea and sneezing.    Respiratory: Negative for cough, chest tightness, shortness of breath and wheezing.    Cardiovascular: Negative for chest pain, palpitations and leg swelling.   Gastrointestinal: Negative for abdominal pain, blood in stool, diarrhea and vomiting.   Genitourinary: Negative for dysuria, flank pain and frequency.   Musculoskeletal: Positive for arthralgias, joint swelling and myalgias. Negative for neck pain and neck stiffness.   Skin: Negative for color change and pallor.   Neurological: Negative for dizziness, weakness, light-headedness and headaches.   Hematological: Negative for adenopathy. Does not bruise/bleed easily.   Psychiatric/Behavioral: Negative for agitation and behavioral problems.        Objective:    Blood pressure (!) 140/60, pulse 70, height 5' 3" (1.6 m), weight 71.2 kg (156 lb 13.7 oz), SpO2 99 %.       Physical Exam   Constitutional: He is oriented to person, place He appears well-developed and well-nourished. No distress.   HENT:   Head: Normocephalic and atraumatic.   Eyes: EOM are normal. Pupils are equal, round, and reactive to light.   Neck: Normal range of motion. Neck supple.   Cardiovascular: Normal rate, regular rhythm, normal heart sounds and intact distal pulses.    Pulmonary/Chest: Effort normal and breath sounds normal. No respiratory distress. He has no wheezes.   Abdominal: Soft. " Bowel sounds are normal. He exhibits no distension. There is no tenderness.   Musculoskeletal: He exhibits no edema, tenderness or deformity.   Right shoulder range of motion limited.    Lymphadenopathy:     He has no cervical adenopathy.   Neurological: He is alert and oriented to person, place. No cranial nerve deficit.   Skin: Skin is warm and dry. Capillary refill takes less than 2 seconds. No rash noted. He is not diaphoretic. No erythema. No pallor.   Psychiatric: He has a normal mood and affect. His behavior is normal.       Assessment:       1. Annual  Chronic right shoulder pain    2. Hand edema    3. CKD (chronic kidney disease) stage 3, GFR 30-59 ml/min       hypertension, BP good today  Plan:       Mr. Townsend is an 89 y/o M with a PMH of gout and HTN who has presented today for investigation of recurrent episodes of gout as well as chronic right shoulder pain.     1. Gout  - Continue allopurinol 300mg tablet 1x per day     2. Right Shoulder Pain  - Seen by orthopedics   - Continue tylenol PRN     3. HTN  - /80 today  lisinopril 20  CMP today

## 2023-04-20 ENCOUNTER — TELEPHONE (OUTPATIENT)
Dept: INTERNAL MEDICINE | Facility: CLINIC | Age: 88
End: 2023-04-20
Payer: MEDICARE

## 2023-04-20 NOTE — TELEPHONE ENCOUNTER
----- Message from Kalia Jones MD sent at 4/19/2023  3:18 PM CDT -----  Blood work confirms stable kidney function as expected.  No changes in medications.  Followup as scheduled.

## 2023-05-04 ENCOUNTER — PES CALL (OUTPATIENT)
Dept: ADMINISTRATIVE | Facility: CLINIC | Age: 88
End: 2023-05-04
Payer: MEDICARE

## 2023-05-22 DIAGNOSIS — M10.9 GOUT, UNSPECIFIED CAUSE, UNSPECIFIED CHRONICITY, UNSPECIFIED SITE: ICD-10-CM

## 2023-05-22 DIAGNOSIS — I10 ESSENTIAL HYPERTENSION: ICD-10-CM

## 2023-05-22 NOTE — TELEPHONE ENCOUNTER
----- Message from Dangelo Peres sent at 5/22/2023  1:05 PM CDT -----  Regarding: Refill Request    Who Called: Micaela Young        New Prescription or Refill : Refill    RX Name and Strength:   allopurinoL (ZYLOPRIM) 100 MG tablet      RX Name and Strength:         RX Name and Strength:      30 day or 90 day RX:     Preferred Pharmacy:[facility] #[id number] [address]    Would the patient rather a call back or a response via [facility]?    Best Call Back Number:  [phone]    Additional Information:

## 2023-05-22 NOTE — TELEPHONE ENCOUNTER
No care due was identified.  [facility] Embedded Care Due Messages. Reference number: [id number].   5/22/2023 1:44:31 PM CDT

## 2023-05-22 NOTE — TELEPHONE ENCOUNTER
Call place to pt regarding scheduling flu vaccine. Appointment scheduled for 11/8/2018 at 2:00 pm. The patient verbalized understanding and had no further questions or concerns.  Niyah Uriarte LPN     IP MH Referral Acuity Rating Score (RARS)     LMHP complete at referral to IP MH, with DEC; and, daily while awaiting IP MH placement. Call score to PPS.  CRITERIA SCORING   New 72 HH and Involuntary for IP MH (not adolescent) 0/1   Boarding over 24 hours 1/1   Vulnerable adult at least 55+ with multiple co morbidities; or, Patient age 11 or under 1/1   Suicide ideation without relief of precipitating factors 1/1   Current plan for suicide 0/1   Current plan for homicide 0/1   Imminent risk or actual attempt to seriously harm another without relief of factors precipitating the attempt 1/1   Severe dysfunction in daily living (ex: complete neglect for self care, extreme disruption in vegetative function, extreme deterioration in social interactions) 1/1   Recent (last 2 weeks) or current physical aggression in the ED 1/1   Restraints or seclusion episode in ED 0/1   Verbal aggression, agitation, yelling, etc., while in the ED 1/1   Active psychosis with psychomotor agitation or catatonia 1/1   Need for constant or near constant redirection (from leaving, from others, etc).  0/1   Intrusive or disruptive behaviors 0/1   TOTAL Acuity Total Score: 8

## 2023-05-23 RX ORDER — ALLOPURINOL 100 MG/1
TABLET ORAL
Qty: 90 TABLET | Refills: 0 | Status: SHIPPED | OUTPATIENT
Start: 2023-05-23 | End: 2023-08-14 | Stop reason: SDUPTHER

## 2023-06-08 ENCOUNTER — TELEPHONE (OUTPATIENT)
Dept: INTERNAL MEDICINE | Facility: CLINIC | Age: 88
End: 2023-06-08
Payer: MEDICARE

## 2023-06-08 NOTE — TELEPHONE ENCOUNTER
----- Message from Shira Guardado sent at 6/6/2023  2:45 PM CDT -----  Regarding: CAll BAck  Name of Who is Calling: Tona Young              What is the request in detail: Tona requesting a call back please assist              Can the clinic reply by [facility]: No              What Number [facility] Back [facility]: [phone]

## 2023-07-10 ENCOUNTER — TELEPHONE (OUTPATIENT)
Dept: INTERNAL MEDICINE | Facility: CLINIC | Age: 88
End: 2023-07-10
Payer: MEDICARE

## 2023-07-10 NOTE — TELEPHONE ENCOUNTER
----- Message from Yasmine Aki sent at 7/10/2023  1:01 PM CDT -----  Regarding: Refill Request  Who Called: Wife of  EVANS EPSTEIN DEAN WHALEN [[id number]]          New Prescription or Refill : Refill      RX Name and Strength:  lisinopriL (PRINIVIL,ZESTRIL) 20 MG tablet      30 day or 90 day RX: 90      Preferred Pharmacy: [facility] #[id number] [address]   Phone:  [phone]  Fax:  [phone]            Would the patient rather a call back or a response via [facility]? Call back        Best Call Back Number:  [phone]        Additional Information:

## 2023-07-13 DIAGNOSIS — I10 ESSENTIAL HYPERTENSION: ICD-10-CM

## 2023-07-13 NOTE — TELEPHONE ENCOUNTER
Pt phone not accepting calls at this time. Pt should still have refills on Lisinopril and Pravastatin at the pharmacy.

## 2023-07-13 NOTE — TELEPHONE ENCOUNTER
----- Message from Genia Caicedo sent at 7/13/2023  1:11 PM CDT -----  Regarding: Patient call back  Who called:EVANS EPSTEIN SR. [[id number]]    What is the request in detail: Patient is requesting a call back. She states she spoke with the pharmacy and they advised her there is not refill available for lisinopriL (PRINIVIL,ZESTRIL) 20 MG tablet or pravastatin (PRAVACHOL) 20 MG tablett. Patient has been out of the medicatiosn for weeks.   Please advise.    Can the clinic reply by [facility]? No    Best call back number: [phone]    Additional Information: N/A

## 2023-07-14 RX ORDER — LISINOPRIL 20 MG/1
20 TABLET ORAL DAILY
Qty: 90 TABLET | Refills: 1 | Status: SHIPPED | OUTPATIENT
Start: 2023-07-14 | End: 2023-11-28 | Stop reason: SDUPTHER

## 2023-07-14 NOTE — TELEPHONE ENCOUNTER
Care Due:                  Date            Visit Type   Department     Provider  --------------------------------------------------------------------------------                                EP -                              PRIMARY      [facility] INTERNAL  Kalia Gupta  Last Visit: 04-      [facility] ([facility])   [facility]  Next Visit: None Scheduled  None         None Found                                                            Last  Test          Frequency    Reason                     Performed    Due Date  --------------------------------------------------------------------------------    CBC.........  12 months..  allopurinoL..............  03- 02-    Lipid Panel.  12 months..  pravastatin..............  Not Found    Overdue    Uric Acid...  12 months..  allopurinoL..............  Not Found    Overdue    [employer] Embedded Care Due Messages. Reference number: [id number].   7/14/2023 11:15:40 AM CDT

## 2023-07-14 NOTE — TELEPHONE ENCOUNTER
Contacted [facility] and they stated the the pt Picked it up both meds last week and should have a 90 day supply on both medications. Pt will need a new script for lisinopril for the next time. Pended med.

## 2023-07-20 ENCOUNTER — OFFICE VISIT (OUTPATIENT)
Dept: NEUROLOGY | Facility: CLINIC | Age: 88
End: 2023-07-20
Payer: MEDICARE

## 2023-07-20 VITALS
SYSTOLIC BLOOD PRESSURE: 150 MMHG | DIASTOLIC BLOOD PRESSURE: 74 MMHG | BODY MASS INDEX: 27.63 KG/M2 | WEIGHT: 156 LBS | HEART RATE: 45 BPM

## 2023-07-20 DIAGNOSIS — M54.9 BACK PAIN, UNSPECIFIED BACK LOCATION, UNSPECIFIED BACK PAIN LATERALITY, UNSPECIFIED CHRONICITY: Primary | ICD-10-CM

## 2023-07-20 DIAGNOSIS — M48.061 SPINAL STENOSIS OF LUMBAR REGION WITHOUT NEUROGENIC CLAUDICATION: ICD-10-CM

## 2023-07-20 PROCEDURE — 1101F PR PT FALLS ASSESS DOC 0-1 FALLS W/OUT INJ PAST YR: ICD-10-PCS | Mod: CPTII,S$GLB,, | Performed by: STUDENT IN AN ORGANIZED HEALTH CARE EDUCATION/TRAINING PROGRAM

## 2023-07-20 PROCEDURE — 1159F PR MEDICATION LIST DOCUMENTED IN MEDICAL RECORD: ICD-10-PCS | Mod: CPTII,S$GLB,, | Performed by: STUDENT IN AN ORGANIZED HEALTH CARE EDUCATION/TRAINING PROGRAM

## 2023-07-20 PROCEDURE — 1101F PT FALLS ASSESS-DOCD LE1/YR: CPT | Mod: CPTII,S$GLB,, | Performed by: STUDENT IN AN ORGANIZED HEALTH CARE EDUCATION/TRAINING PROGRAM

## 2023-07-20 PROCEDURE — 99204 PR OFFICE/OUTPT VISIT, NEW, LEVL IV, 45-59 MIN: ICD-10-PCS | Mod: S$GLB,,, | Performed by: STUDENT IN AN ORGANIZED HEALTH CARE EDUCATION/TRAINING PROGRAM

## 2023-07-20 PROCEDURE — 99204 OFFICE O/P NEW MOD 45 MIN: CPT | Mod: S$GLB,,, | Performed by: STUDENT IN AN ORGANIZED HEALTH CARE EDUCATION/TRAINING PROGRAM

## 2023-07-20 PROCEDURE — 1126F PR PAIN SEVERITY QUANTIFIED, NO PAIN PRESENT: ICD-10-PCS | Mod: CPTII,S$GLB,, | Performed by: STUDENT IN AN ORGANIZED HEALTH CARE EDUCATION/TRAINING PROGRAM

## 2023-07-20 PROCEDURE — 1159F MED LIST DOCD IN RCRD: CPT | Mod: CPTII,S$GLB,, | Performed by: STUDENT IN AN ORGANIZED HEALTH CARE EDUCATION/TRAINING PROGRAM

## 2023-07-20 PROCEDURE — 99999 PR PBB SHADOW E&M-EST. PATIENT-LVL III: ICD-10-PCS | Mod: PBBFAC,,, | Performed by: STUDENT IN AN ORGANIZED HEALTH CARE EDUCATION/TRAINING PROGRAM

## 2023-07-20 PROCEDURE — 3288F FALL RISK ASSESSMENT DOCD: CPT | Mod: CPTII,S$GLB,, | Performed by: STUDENT IN AN ORGANIZED HEALTH CARE EDUCATION/TRAINING PROGRAM

## 2023-07-20 PROCEDURE — 1126F AMNT PAIN NOTED NONE PRSNT: CPT | Mod: CPTII,S$GLB,, | Performed by: STUDENT IN AN ORGANIZED HEALTH CARE EDUCATION/TRAINING PROGRAM

## 2023-07-20 PROCEDURE — 3288F PR FALLS RISK ASSESSMENT DOCUMENTED: ICD-10-PCS | Mod: CPTII,S$GLB,, | Performed by: STUDENT IN AN ORGANIZED HEALTH CARE EDUCATION/TRAINING PROGRAM

## 2023-07-20 PROCEDURE — 99999 PR PBB SHADOW E&M-EST. PATIENT-LVL III: CPT | Mod: PBBFAC,,, | Performed by: STUDENT IN AN ORGANIZED HEALTH CARE EDUCATION/TRAINING PROGRAM

## 2023-07-20 NOTE — PROGRESS NOTES
Neurology Clinic Note      Date: 7/20/23  Patient Name: Viktor Townsend Sr.   MRN: [id number]   PCP: Kalia Jones  Referring Provider: Self, Aaareferral    Assessment and Plan:   Viktor Townsend Sr. is a 89 y.o. male presenting for evaluation of chronic low back with lumbar spinal stenosis and neurogenic claudication. The back pain and claudication have improved over the past 2yrs.  Referral placed to PT/OT. Do not feel any further imaging would be required at this point.    He has also seen Orthopedics for cervical myelomalacia. His exam is normal and he doesn't have evidence of myelopathy on exam. I advised him to re-establish with Orthopedics if he develops any worsening symptoms.    RTC as needed.      Problem List Items Addressed This Visit          Neuro    Spinal stenosis of lumbar region without neurogenic claudication    Relevant Orders    Ambulatory referral/consult to Physical/Occupational Therapy       Orthopedic    Back pain - Primary    Relevant Orders    Ambulatory referral/consult to Physical/Occupational Therapy         Subjective:          HPI:   Mr. Viktor Townsend Sr. is a 89 y.o. male with a history of HTN, HLD, CKD, aortic atherosclerosis, MGUS, B12 deficiency presenting for evaluation of back pain and weakness in his lower extremities.    Had a mechanical fall around 2 years ago with injury to the lower back.  Since then, he has been experiencing back pain in the thoracic and lumbar region.  Previously had radicular pain down his lower extremities but this has resolved.  His main concern today is pain in his legs while standing for prolonged periods along with weakness of the legs.  However, he reiterates that this has also improved since 2 years ago.  He also brings up a 'knot' in his left thoracic paraspinal region which is the area of injury when he fell.  He denies any neck pain or radiating pain down the upper extremities.  He has chronic weakness of his hands since the fall and  has difficulty gripping objects and buttoning his clothes.    He has previously seen orthopedics for the above-mentioned complaints.    MRI March 2021:  Cervical spondylosis, resulting in moderate/ severe spinal canal and neural foraminal stenosis from C4-5 through C7-T1, as above.  Increased cord signal noted at the C4-5 and C5-6 levels, concerning for edema and/or myelomalacia.  Lumbar spondylosis, resulting in severe spinal canal stenosis and moderate/ severe neural foraminal narrowing at L4-5 and L5-S1, as above.  Partial lumbarization of S1 noted, normal variant.  Advanced right-sided degenerative disc disease and facet arthropathy at L5-S1.  No fracture identified.  Mild spinal canal stenosis at T11-12.    He was offered C3-T1 posterior laminectomy and fusion for myelopathy back in 2021 but declined.    Had a fall 3 months ago when he tripped and fell with trauma to his head.  Denies any loss of consciousness.  Denies any headaches.  Denies any history of frequent falls.    PAST MEDICAL HISTORY:  Past Medical History:   Diagnosis Date    Colon polyps     Hiatal hernia     Lumbar back pain     Personal history of asbestosis        PAST SURGICAL HISTORY:  Past Surgical History:   Procedure Laterality Date    CHOLECYSTECTOMY      EYE SURGERY      PROSTATE SURGERY         CURRENT MEDS:  Current Outpatient Medications   Medication Sig Dispense Refill    acetaminophen (TYLENOL) 325 MG tablet Take 325 mg by mouth every 6 (six) hours as needed for Pain.      allopurinoL (ZYLOPRIM) 100 MG tablet TAKE 1 TABLET(300 MG) BY MOUTH EVERY DAY 90 tablet 0    calcium carbonate (TUMS) 200 mg calcium (500 mg) chewable tablet Take 1 tablet by mouth 3 (three) times daily as needed for Heartburn.      cyanocobalamin, vitamin B-12, (VITAMIN B-12 ORAL) Take by mouth.      ergocalciferol (VITAMIN D2) 50,000 unit Cap Take 50,000 Units by mouth every 7 days.      hydrocortisone 2.5 % cream Apply topically 2 (two) times daily. 28 g 0     lisinopriL (PRINIVIL,ZESTRIL) 20 MG tablet Take 1 tablet (20 mg total) by mouth once daily. 90 tablet 1    pravastatin (PRAVACHOL) 20 MG tablet Take 1 tablet (20 mg total) by mouth once daily. 90 tablet 3     No current facility-administered medications for this visit.       ALLERGIES:  Review of patient's allergies indicates:  No Known Allergies    FAMILY HISTORY:  Family History   Problem Relation Age of Onset    Hyperlipidemia Father     Hypertension Father     Diabetes Father     No Known Problems Mother     Cancer Sister     Dementia Sister     Diabetes Sister     No Known Problems Sister        SOCIAL HISTORY:  Social History     Tobacco Use    Smoking status: Former     Current packs/day: 0.00     Passive exposure: Past    Smokeless tobacco: Never   Substance Use Topics    Alcohol use: No     Alcohol/week: 0.0 standard drinks of alcohol    Drug use: No       Review of Systems:  12 system review of systems is negative except for the symptoms mentioned in HPI.      Objective:     Vitals:    07/20/23 1335   BP: (!) 150/74   Pulse: (!) 45   Weight: 70.8 kg (156 lb)     General: NAD, well nourished   Eyes: no tearing, discharge, no erythema   Neck: Supple, full range of motion  Cardiovascular: Warm and well perfused  Lungs: Normal work of breathing  Skin: No rash, lesions, or breakdown on exposed skin  Psychiatry: Mood and affect are appropriate     Physical Exam  Eyes:      Extraocular Movements: EOM normal.   Musculoskeletal:      Comments: No tenderness along the cervical, thoracic or lumbar paraspinal regions.   Neurological:      Mental Status: He is oriented to person, place, and time.      Coordination: Finger-Nose-Finger Test normal.      Deep Tendon Reflexes:      Reflex Scores:       Bicep reflexes are 2+ on the right side and 2+ on the left side.       Brachioradialis reflexes are 2+ on the right side and 2+ on the left side.       Patellar reflexes are 2+ on the right side and 2+ on the left side.        Achilles reflexes are 2+ on the right side and 2+ on the left side.  Psychiatric:         Speech: Speech normal.         NEUROLOGICAL EXAMINATION:     MENTAL STATUS   Oriented to person, place, and time.   Follows 2 step commands.   Speech: speech is normal   Level of consciousness: alert    CRANIAL NERVES     CN II   Visual fields full to confrontation.     CN III, IV, VI   Extraocular motions are normal.   Ophthalmoparesis: none    CN V   Facial sensation intact.     CN VII   Facial expression full, symmetric.     CN XI   CN XI normal.     CN XII   CN XII normal.     MOTOR EXAM     Strength   Right deltoid: 5/5  Left deltoid: 5/5  Right biceps: 5/5  Left biceps: 5/5  Right triceps: 5/5  Left triceps: 5/5  Right wrist flexion: 5/5  Left wrist flexion: 5/5  Right wrist extension: 5/5  Left wrist extension: 5/5  Right iliopsoas: 4/5  Left iliopsoas: 5/5  Right quadriceps: 5/5  Left quadriceps: 5/5  Right hamstrin/5  Left hamstrin/5  Right glutei: 5/5  Left glutei: 5/5  Right anterior tibial: 5/5  Left anterior tibial: 5/5  Right posterior tibial: 5/5  Right gastroc: 5/5  Left gastroc: 5/5       R EHL 4-/5  L EHL 5/5     REFLEXES     Reflexes   Right brachioradialis: 2+  Left brachioradialis: 2+  Right biceps: 2+  Left biceps: 2+  Right patellar: 2+  Left patellar: 2+  Right achilles: 2+  Left achilles: 2+  Right plantar: normal  Left plantar: normal  Right Shah: absent  Left Shah: absent  Right ankle clonus: absent  Left ankle clonus: absent       R cross adductor+     SENSORY EXAM   Light touch normal.     GAIT AND COORDINATION      Coordination   Finger to nose coordination: normal    Tremor   Intention tremor: absent       Hunched over, leaning to the right,.         Images:    Other Studies:          Nakul Fraire MD  Department of Neurology  [facility]

## 2023-08-09 PROBLEM — M54.9 BACK PAIN: Status: ACTIVE | Noted: 2023-08-09

## 2023-08-09 PROBLEM — M48.061 SPINAL STENOSIS OF LUMBAR REGION WITHOUT NEUROGENIC CLAUDICATION: Status: ACTIVE | Noted: 2023-08-09

## 2023-08-14 DIAGNOSIS — I10 ESSENTIAL HYPERTENSION: ICD-10-CM

## 2023-08-14 DIAGNOSIS — M10.9 GOUT, UNSPECIFIED CAUSE, UNSPECIFIED CHRONICITY, UNSPECIFIED SITE: ICD-10-CM

## 2023-08-14 RX ORDER — ALLOPURINOL 100 MG/1
TABLET ORAL
Qty: 90 TABLET | Refills: 3 | Status: SHIPPED | OUTPATIENT
Start: 2023-08-14

## 2023-08-14 NOTE — TELEPHONE ENCOUNTER
----- Message from Dangelo Peres sent at 8/14/2023 11:01 AM CDT -----  Regarding: Refill Request    Who Called: Patient        New Prescription or Refill : Refill  allopurinoL (ZYLOPRIM) 100 MG tablet  RX Name and Strength:         RX Name and Strength:         RX Name and Strength:      30 day or 90 day RX:     Preferred Pharmacy:[facility] #[id number] [address] & [facility]    Would the patient rather a call back or a response via [facility]?    Best Call Back Number:  [phone]    Additional Information:

## 2023-08-14 NOTE — TELEPHONE ENCOUNTER
Refill Routing Note   Medication(s) are not appropriate for processing by [facility] for the following reason(s):      Required labs outdated    [facility] action(s):  Defer Care Due:  None identified            Appointments  past 12m or future 3m with PCP    Date Provider   Last Visit   4/19/2023 Kalia Jones MD   Next Visit   Visit date not found Kalia Jones MD   ED visits in past 90 days: 0        Note composed:11:18 AM 08/14/2023

## 2023-08-14 NOTE — TELEPHONE ENCOUNTER
No care due was identified.  [facility] Embedded Care Due Messages. Reference number: [id number].   8/14/2023 11:11:40 AM CDT

## 2023-08-14 NOTE — TELEPHONE ENCOUNTER
Refill Encounter    PCP Visits: Recent Visits  Date Type Provider Dept   04/19/23 Office Visit Kalia Jones MD [facility] Internal Medicine   11/28/22 Office Visit Kalia Jones MD [facility] Internal Medicine   Showing recent visits within past 360 days and meeting all other requirements  Future Appointments  No visits were found meeting these conditions.  Showing future appointments within next 720 days and meeting all other requirements     Last 3 Blood Pressure:   BP Readings from Last 3 Encounters:   07/20/23 (!) 150/74   04/19/23 138/60   11/28/22 138/80     Preferred Pharmacy:   [id number] [place] [address] [address] [facility] [address]  Phone: [phone] Fax: [phone]    Requested RX:  Requested Prescriptions      No prescriptions requested or ordered in this encounter      RX Route: Normal

## 2023-09-07 ENCOUNTER — TELEPHONE (OUTPATIENT)
Dept: INTERNAL MEDICINE | Facility: CLINIC | Age: 88
End: 2023-09-07
Payer: MEDICARE

## 2023-09-07 NOTE — TELEPHONE ENCOUNTER
----- Message from Padmini Suazo sent at 9/6/2023  1:19 PM CDT -----  Regarding: call back / hernia  Name of Who is Calling:EVANS EPSTEIN SR. [[id number]]          What is the request in detail:pt would like Dr to call back. He has a hernia that is very painful. Please call back to advise           Can the clinic reply by [facility]:          What Number to Call Back [facility][phone]

## 2023-09-07 NOTE — TELEPHONE ENCOUNTER
Patient states that he missed a call from Dr. Jones.  Patient states that he has been having pain from his hernia for 3 weeks now and would like for Dr. Jones to give him a call back.

## 2023-09-07 NOTE — TELEPHONE ENCOUNTER
----- Message from Lamine Edwards sent at 9/7/2023 10:15 AM CDT -----  Regarding: RETURN CALL  Who Called: EVANS EPSTEIN SR. [[id number]]        Who Left Message for Patient:unk        Does the patient know what this is regarding?no        Best Call Back Number:[phone]        Additional Information:

## 2023-09-08 ENCOUNTER — HOSPITAL ENCOUNTER (OUTPATIENT)
Dept: RADIOLOGY | Facility: OTHER | Age: 88
Discharge: HOME | End: 2023-09-08
Attending: PHYSICIAN ASSISTANT
Payer: MEDICARE

## 2023-09-08 ENCOUNTER — OFFICE VISIT (OUTPATIENT)
Dept: INTERNAL MEDICINE | Facility: CLINIC | Age: 88
End: 2023-09-08
Payer: MEDICARE

## 2023-09-08 VITALS
SYSTOLIC BLOOD PRESSURE: 136 MMHG | OXYGEN SATURATION: 98 % | HEIGHT: 63 IN | BODY MASS INDEX: 27.93 KG/M2 | DIASTOLIC BLOOD PRESSURE: 74 MMHG | HEART RATE: 70 BPM | WEIGHT: 157.63 LBS

## 2023-09-08 DIAGNOSIS — R10.9 ABDOMINAL PAIN, UNSPECIFIED ABDOMINAL LOCATION: Primary | ICD-10-CM

## 2023-09-08 DIAGNOSIS — R10.9 ABDOMINAL PAIN, UNSPECIFIED ABDOMINAL LOCATION: ICD-10-CM

## 2023-09-08 PROCEDURE — 1101F PR PT FALLS ASSESS DOC 0-1 FALLS W/OUT INJ PAST YR: ICD-10-PCS | Mod: CPTII,S$GLB,, | Performed by: PHYSICIAN ASSISTANT

## 2023-09-08 PROCEDURE — 99999 PR PBB SHADOW E&M-EST. PATIENT-LVL IV: CPT | Mod: PBBFAC,,, | Performed by: PHYSICIAN ASSISTANT

## 2023-09-08 PROCEDURE — 1125F PR PAIN SEVERITY QUANTIFIED, PAIN PRESENT: ICD-10-PCS | Mod: CPTII,S$GLB,, | Performed by: PHYSICIAN ASSISTANT

## 2023-09-08 PROCEDURE — 1125F AMNT PAIN NOTED PAIN PRSNT: CPT | Mod: CPTII,S$GLB,, | Performed by: PHYSICIAN ASSISTANT

## 2023-09-08 PROCEDURE — 99214 OFFICE O/P EST MOD 30 MIN: CPT | Mod: S$GLB,,, | Performed by: PHYSICIAN ASSISTANT

## 2023-09-08 PROCEDURE — 3288F FALL RISK ASSESSMENT DOCD: CPT | Mod: CPTII,S$GLB,, | Performed by: PHYSICIAN ASSISTANT

## 2023-09-08 PROCEDURE — 25500020 PHARM REV CODE 255: Performed by: PHYSICIAN ASSISTANT

## 2023-09-08 PROCEDURE — 74177 CT ABD & PELVIS W/CONTRAST: CPT | Mod: 26,,, | Performed by: RADIOLOGY

## 2023-09-08 PROCEDURE — 74177 CT ABDOMEN PELVIS WITH CONTRAST: ICD-10-PCS | Mod: 26,,, | Performed by: RADIOLOGY

## 2023-09-08 PROCEDURE — 1159F PR MEDICATION LIST DOCUMENTED IN MEDICAL RECORD: ICD-10-PCS | Mod: CPTII,S$GLB,, | Performed by: PHYSICIAN ASSISTANT

## 2023-09-08 PROCEDURE — 1159F MED LIST DOCD IN RCRD: CPT | Mod: CPTII,S$GLB,, | Performed by: PHYSICIAN ASSISTANT

## 2023-09-08 PROCEDURE — 3288F PR FALLS RISK ASSESSMENT DOCUMENTED: ICD-10-PCS | Mod: CPTII,S$GLB,, | Performed by: PHYSICIAN ASSISTANT

## 2023-09-08 PROCEDURE — 99999 PR PBB SHADOW E&M-EST. PATIENT-LVL IV: ICD-10-PCS | Mod: PBBFAC,,, | Performed by: PHYSICIAN ASSISTANT

## 2023-09-08 PROCEDURE — 1101F PT FALLS ASSESS-DOCD LE1/YR: CPT | Mod: CPTII,S$GLB,, | Performed by: PHYSICIAN ASSISTANT

## 2023-09-08 PROCEDURE — A9698 NON-RAD CONTRAST MATERIALNOC: HCPCS | Performed by: PHYSICIAN ASSISTANT

## 2023-09-08 PROCEDURE — 99214 PR OFFICE/OUTPT VISIT, EST, LEVL IV, 30-39 MIN: ICD-10-PCS | Mod: S$GLB,,, | Performed by: PHYSICIAN ASSISTANT

## 2023-09-08 PROCEDURE — 74177 CT ABD & PELVIS W/CONTRAST: CPT | Mod: TC

## 2023-09-08 RX ORDER — DOCUSATE SODIUM 100 MG/1
100 CAPSULE, LIQUID FILLED ORAL 2 TIMES DAILY
Qty: 30 CAPSULE | Refills: 0 | Status: SHIPPED | OUTPATIENT
Start: 2023-09-08

## 2023-09-08 RX ADMIN — IOHEXOL 75 ML: 350 INJECTION, SOLUTION INTRAVENOUS at 10:09

## 2023-09-08 RX ADMIN — IOHEXOL 1000 ML: 9 SOLUTION ORAL at 09:09

## 2023-09-08 NOTE — PROGRESS NOTES
INTERNAL MEDICINE URGENT VISIT NOTE    CHIEF COMPLAINT     Chief Complaint   Patient presents with    Hip Pain     groin       HPI     Viktor Townsend Sr. is a 89 y.o. male who presents for an urgent visit today.    PCP is Kalia Jones MD, patient is known to me.     Pt sent message to PCP stating he is having pain from his hernia. History of left inguinal hernia. This was repaired by Dr. Melton in 2017.  He is complaining today of right lower abdominal pain that comes and goes - no pain today in office.   Sometimes feels a bulge in the right groin, no testicular pain.   +constipation with a BM every 2-3 days.       Past Medical History:  Past Medical History:   Diagnosis Date    Colon polyps     Hiatal hernia     Lumbar back pain     Personal history of asbestosis        Home Medications:  Prior to Admission medications    Medication Sig Start Date End Date Taking? Authorizing Provider   acetaminophen (TYLENOL) 325 MG tablet Take 325 mg by mouth every 6 (six) hours as needed for Pain.    Provider, Historical   allopurinoL (ZYLOPRIM) 100 MG tablet TAKE 1 TABLET(300 MG) BY MOUTH EVERY DAY 8/14/23   Kalia Jones MD   calcium carbonate (TUMS) 200 mg calcium (500 mg) chewable tablet Take 1 tablet by mouth 3 (three) times daily as needed for Heartburn.    Provider, Historical   cyanocobalamin, vitamin B-12, (VITAMIN B-12 ORAL) Take by mouth.    Provider, Historical   ergocalciferol (VITAMIN D2) 50,000 unit Cap Take 50,000 Units by mouth every 7 days.    Provider, Historical   hydrocortisone 2.5 % cream Apply topically 2 (two) times daily. 5/7/21   Renetat Rios PA-C   lisinopriL (PRINIVIL,ZESTRIL) 20 MG tablet Take 1 tablet (20 mg total) by mouth once daily. 7/14/23   Pepe Rossi MD   pravastatin (PRAVACHOL) 20 MG tablet Take 1 tablet (20 mg total) by mouth once daily. 3/29/23   Kalia Jones MD       Review of Systems:  Review of Systems   Constitutional:  Negative for  chills and fever.   HENT:  Negative for sore throat and trouble swallowing.    Eyes:  Negative for visual disturbance.   Respiratory:  Negative for cough and shortness of breath.    Cardiovascular:  Negative for chest pain.   Gastrointestinal:  Negative for abdominal pain, constipation, diarrhea, nausea and vomiting.   Genitourinary:  Negative for dysuria and flank pain.   Musculoskeletal:  Negative for back pain, neck pain and neck stiffness.   Skin:  Negative for rash.   Neurological:  Negative for dizziness, syncope, weakness and headaches.   Psychiatric/Behavioral:  Negative for confusion.        Health Maintainence:   Immunizations:  Health Maintenance         Date Due Completion Date    Hemoglobin A1c (Prediabetes) 12/08/2021 12/8/2020    COVID-19 Vaccine (5 - Pfizer series) 04/07/2023 12/7/2022    Influenza Vaccine (1) 09/01/2023 11/23/2022    Lipid Panel 12/08/2025 12/8/2020    TETANUS VACCINE 08/08/2026 8/8/2016             PHYSICAL EXAM     There were no vitals taken for this visit.    Physical Exam  Vitals and nursing note reviewed.   Constitutional:       Appearance: Normal appearance.      Comments: Elderly appearing male who appears younger than stated age in NAD or apparent pain. He makes good eye contact, speaks in clear full sentences and ambulates with ease.        HENT:      Head: Normocephalic and atraumatic.      Nose: Nose normal.      Mouth/Throat:      Pharynx: Oropharynx is clear.   Eyes:      Conjunctiva/sclera: Conjunctivae normal.   Cardiovascular:      Rate and Rhythm: Normal rate and regular rhythm.      Pulses: Normal pulses.   Pulmonary:      Effort: No respiratory distress.   Abdominal:      Tenderness: There is no abdominal tenderness.      Comments: Right groin has no no palpable bulge or mass.   There is some mild point localized TTP  No overlying skin changes      Musculoskeletal:         General: Normal range of motion.      Cervical back: No rigidity.   Skin:     General: Skin  is warm and dry.      Capillary Refill: Capillary refill takes less than 2 seconds.      Findings: No rash.   Neurological:      General: No focal deficit present.      Mental Status: He is alert.      Gait: Gait normal.   Psychiatric:         Mood and Affect: Mood normal.         LABS     Lab Results   Component Value Date    HGBA1C 5.1 12/08/2020     CMP  Sodium   Date Value Ref Range Status   04/19/2023 144 136 - 145 mmol/L Final     Potassium   Date Value Ref Range Status   04/19/2023 3.6 3.5 - 5.1 mmol/L Final     Chloride   Date Value Ref Range Status   04/19/2023 108 95 - 110 mmol/L Final     CO2   Date Value Ref Range Status   04/19/2023 26 23 - 29 mmol/L Final     Glucose   Date Value Ref Range Status   04/19/2023 91 70 - 110 mg/dL Final     BUN   Date Value Ref Range Status   04/19/2023 23 8 - 23 mg/dL Final     Creatinine   Date Value Ref Range Status   04/19/2023 1.7 (H) 0.5 - 1.4 mg/dL Final     Calcium   Date Value Ref Range Status   04/19/2023 9.8 8.7 - 10.5 mg/dL Final     Total Protein   Date Value Ref Range Status   04/19/2023 7.3 6.0 - 8.4 g/dL Final     Albumin   Date Value Ref Range Status   04/19/2023 3.8 3.5 - 5.2 g/dL Final     Total Bilirubin   Date Value Ref Range Status   04/19/2023 1.5 (H) 0.1 - 1.0 mg/dL Final     Comment:     For infants and newborns, interpretation of results should be based  on gestational age, weight and in agreement with clinical  observations.    Premature Infant recommended reference ranges:  Up to 24 hours.............<8.0 mg/dL  Up to 48 hours............<12.0 mg/dL  3-5 days..................<15.0 mg/dL  6-29 days.................<15.0 mg/dL       Alkaline Phosphatase   Date Value Ref Range Status   04/19/2023 103 55 - 135 U/L Final     AST   Date Value Ref Range Status   04/19/2023 15 10 - 40 U/L Final     ALT   Date Value Ref Range Status   04/19/2023 11 10 - 44 U/L Final     Anion Gap   Date Value Ref Range Status   04/19/2023 10 8 - 16 mmol/L Final     eGFR  if    Date Value Ref Range Status   07/28/2022 44 (A) >60 mL/min/1.73 m^2 Final     eGFR if non    Date Value Ref Range Status   07/28/2022 38 (A) >60 mL/min/1.73 m^2 Final     Comment:     Calculation used to obtain the estimated glomerular filtration  rate (eGFR) is the CKD-EPI equation.        Lab Results   Component Value Date    WBC 6.46 03/03/2022    HGB 14.3 03/03/2022    HCT 44.7 03/03/2022    MCV 91 03/03/2022     03/03/2022     Lab Results   Component Value Date    CHOL 129 12/08/2020    CHOL 142 02/06/2017    CHOL 155 02/15/2016     Lab Results   Component Value Date    HDL 49 12/08/2020    HDL 41 02/06/2017    HDL 42 02/15/2016     Lab Results   Component Value Date    LDLCALC 58.8 (L) 12/08/2020    LDLCALC 76.0 02/06/2017    LDLCALC 85.0 02/15/2016     Lab Results   Component Value Date    TRIG 106 12/08/2020    TRIG 125 02/06/2017    TRIG 140 02/15/2016     Lab Results   Component Value Date    CHOLHDL 38.0 12/08/2020    CHOLHDL 28.9 02/06/2017    CHOLHDL 27.1 02/15/2016     Lab Results   Component Value Date    TSH 1.612 12/08/2020       ASSESSMENT/PLAN     Viktor Townsend Sr. is a 89 y.o. male     Viktor was seen today for right lower quadrant/ right groin pain. Pt suspect hernia - nothing appreciable on exam. No acute abdomen. Will get CT and will consider referral to gen surg. Will recommend colace to help with bowel movements - could be contributing or etiology of this pain.     Diagnoses and all orders for this visit:    Abdominal pain, unspecified abdominal location  -     CT Abdomen Pelvis With Contrast; Future  -     BASIC METABOLIC PANEL; Future    Patient was counseled on when and how to seek emergent care.       Renetta Rios PA-C   Department of Internal Medicine - [facility]   8:11 AM

## 2023-09-14 ENCOUNTER — OFFICE VISIT (OUTPATIENT)
Dept: SURGERY | Facility: CLINIC | Age: 88
End: 2023-09-14
Attending: SPECIALIST
Payer: MEDICARE

## 2023-09-14 VITALS
WEIGHT: 157 LBS | HEART RATE: 67 BPM | BODY MASS INDEX: 24.64 KG/M2 | SYSTOLIC BLOOD PRESSURE: 147 MMHG | OXYGEN SATURATION: 99 % | DIASTOLIC BLOOD PRESSURE: 74 MMHG | HEIGHT: 67 IN

## 2023-09-14 DIAGNOSIS — K40.90 RIGHT INGUINAL HERNIA: ICD-10-CM

## 2023-09-14 PROCEDURE — 99999 PR PBB SHADOW E&M-EST. PATIENT-LVL IV: ICD-10-PCS | Mod: PBBFAC,,, | Performed by: SPECIALIST

## 2023-09-14 PROCEDURE — 99999 PR PBB SHADOW E&M-EST. PATIENT-LVL IV: CPT | Mod: PBBFAC,,, | Performed by: SPECIALIST

## 2023-09-14 RX ORDER — HYDROCODONE BITARTRATE AND ACETAMINOPHEN 5; 325 MG/1; MG/1
1 TABLET ORAL EVERY 6 HOURS PRN
Qty: 20 TABLET | Refills: 0 | Status: SHIPPED | OUTPATIENT
Start: 2023-09-14

## 2023-09-15 NOTE — PROGRESS NOTES
History & Physical    SUBJECTIVE:     History of Present Illness:  Patient is a 89 y.o. male with c/o right groin pain for about 2 weeks. Noted right groin mass comes and goes.     Chief Complaint   Patient presents with    Abdominal Pain       Review of patient's allergies indicates:  No Known Allergies    Current Outpatient Medications   Medication Sig Dispense Refill    acetaminophen (TYLENOL) 325 MG tablet Take 325 mg by mouth every 6 (six) hours as needed for Pain.      allopurinoL (ZYLOPRIM) 100 MG tablet TAKE 1 TABLET(300 MG) BY MOUTH EVERY DAY 90 tablet 3    calcium carbonate (TUMS) 200 mg calcium (500 mg) chewable tablet Take 1 tablet by mouth 3 (three) times daily as needed for Heartburn.      cyanocobalamin, vitamin B-12, (VITAMIN B-12 ORAL) Take by mouth.      docusate sodium (COLACE) 100 MG capsule Take 1 capsule (100 mg total) by mouth 2 (two) times daily. 30 capsule 0    ergocalciferol (VITAMIN D2) 50,000 unit Cap Take 50,000 Units by mouth every 7 days.      HYDROcodone-acetaminophen (NORCO) 5-325 mg per tablet Take 1 tablet by mouth every 6 (six) hours as needed for Pain. 20 tablet 0    hydrocortisone 2.5 % cream Apply topically 2 (two) times daily. 28 g 0    lisinopriL (PRINIVIL,ZESTRIL) 20 MG tablet Take 1 tablet (20 mg total) by mouth once daily. 90 tablet 1    pravastatin (PRAVACHOL) 20 MG tablet Take 1 tablet (20 mg total) by mouth once daily. 90 tablet 3     No current facility-administered medications for this visit.       Past Medical History:   Diagnosis Date    Colon polyps     Hiatal hernia     Lumbar back pain     Personal history of asbestosis      Past Surgical History:   Procedure Laterality Date    CHOLECYSTECTOMY      EYE SURGERY      PROSTATE SURGERY       Family History   Problem Relation Age of Onset    Hyperlipidemia Father     Hypertension Father     Diabetes Father     No Known Problems Mother     Cancer Sister     Dementia Sister     Diabetes Sister     No Known Problems  "Sister      Social History     Tobacco Use    Smoking status: Former     Passive exposure: Past    Smokeless tobacco: Never   Substance Use Topics    Alcohol use: No     Alcohol/week: 0.0 standard drinks of alcohol    Drug use: No        Review of Systems:  Review of Systems   Constitutional:  Negative for chills and fever.   Gastrointestinal:  Negative for diarrhea, nausea and vomiting.       OBJECTIVE:     Vital Signs (Most Recent)  Pulse: 67 (09/14/23 1024)  BP: (!) 147/74 (09/14/23 1024)  SpO2: 99 % (09/14/23 1024)  5' 7" (1.702 m)  71.2 kg (157 lb)     Physical Exam:  Physical Exam  Abdominal:      Hernia: A hernia is present. Hernia is present in the right inguinal area.      Comments: Palpable right inguinal hernia which is tender but reducible.            Laboratory    Contains abnormal data BASIC METABOLIC PANEL  Order: [id number]  Status: Final result     Visible to patient: Yes (not seen)     Next appt: None     Dx: Abdominal pain, unspecified abdominal...     0 Result Notes             Component Ref Range & Units 7 d ago  (9/8/23) 4 mo ago  (4/19/23) 1 yr ago  (7/28/22) 1 yr ago  (3/3/22) 1 yr ago  (12/6/21) 1 yr ago  (10/29/21) 2 yr ago  (4/1/21)   Sodium 136 - 145 mmol/L 141  144  138  141  141  141  140    Potassium 3.5 - 5.1 mmol/L 3.8  3.6  3.7  3.6  4.0  4.1  4.2    Chloride 95 - 110 mmol/L 105  108  105  106  105  106  105    CO2 23 - 29 mmol/L 26  26  23  25  25  24  28    Glucose 70 - 110 mg/dL 94  91  80  96  93  91  86    BUN 8 - 23 mg/dL 20  23  24 High   23  24 High   18  27 High     Creatinine 0.5 - 1.4 mg/dL 1.7 High   1.7 High   1.6 High   1.7 High   1.9 High   1.8 High   2.1 High     Calcium 8.7 - 10.5 mg/dL 9.8  9.8  9.5  9.7  9.5  9.8  9.3    Anion Gap 8 - 16 mmol/L 10  10  10  10  11  11  7 Low     eGFR >60 mL/min/1.73 m^2 38 Abnormal   38 Abnormal               Diagnostic Results:  CT Abdomen Pelvis With Contrast  Order: [id number]  Status: Final result     Visible to patient: Yes " (seen)     Next appt: None     Dx: Abdominal pain, unspecified abdominal...     0 Result Notes    1 Patient Communication  Details    Reading Physician Reading Date Result Priority   Lamar Torres MD  [phone] 9/8/2023 Routine     Narrative & Impression  EXAMINATION:  CT ABDOMEN PELVIS WITH CONTRAST     CLINICAL HISTORY:  Abdominal pain, acute, nonlocalized; Unspecified abdominal pain     TECHNIQUE:  Low dose axial images, sagittal and coronal reformations were obtained from the lung bases to the pubic symphysis following the IV administration of 75 mL of Omnipaque 350.     COMPARISON:  None.     FINDINGS:  The lung bases show subsegmental bibasilar atelectasis or scarring..     The liver is homogeneous.  Gallbladder is surgically absent.  Common duct is dilated measuring 11 mm.  High-density focus in the dependent aspect of the duct at the level of the pancreatic head measures 4-5 mm.     Thinly septated cyst in the spleen measures 1.5 cm.  Pancreas is homogeneous..     Nonspecific thickening of the left adrenal gland.  Kidneys concentrate contrast appropriately.  The urinary bladder is unremarkable.     Aorta is normal caliber.  Calcified and noncalcified atherosclerosis no retroperitoneal or mesenteric lymph node enlargement seen.     Stomach and loops of bowel are normal caliber.  Small volume of fluid extends into the proximal inguinal canal on the right.  No extension of bowel loops.     Regional skeleton shows degenerative change.     Impression:     Small volume of fluid extends into the proximal right inguinal canal.  No bowel containing hernia on either side.     Cholecystectomy.  Common duct is dilated.  There appears to be a small high-density focus dependently in the duct at the level of the pancreatic head which may represent a small stone.  Correlation with laboratory values recommended.        Electronically signed by: Lamar Torres  Date:                                             09/08/2023  Time:                                           11:51           Exam Ended: 09/08/23 10:43 Last Resulted: 09/08/23 11:51                ASSESSMENT/PLAN:     Right inguinal hernia     PLAN:Plan       RTC Dr Melton for repair

## 2023-10-09 ENCOUNTER — OFFICE VISIT (OUTPATIENT)
Dept: SURGERY | Facility: CLINIC | Age: 88
End: 2023-10-09
Attending: SPECIALIST
Payer: MEDICARE

## 2023-10-09 VITALS
WEIGHT: 157 LBS | SYSTOLIC BLOOD PRESSURE: 165 MMHG | OXYGEN SATURATION: 97 % | HEART RATE: 68 BPM | BODY MASS INDEX: 24.64 KG/M2 | DIASTOLIC BLOOD PRESSURE: 81 MMHG | HEIGHT: 67 IN

## 2023-10-09 DIAGNOSIS — K40.90 RIGHT INGUINAL HERNIA: Primary | ICD-10-CM

## 2023-10-09 PROCEDURE — 99212 OFFICE O/P EST SF 10 MIN: CPT | Mod: S$GLB,,, | Performed by: SPECIALIST

## 2023-10-09 PROCEDURE — 1159F PR MEDICATION LIST DOCUMENTED IN MEDICAL RECORD: ICD-10-PCS | Mod: CPTII,S$GLB,, | Performed by: SPECIALIST

## 2023-10-09 PROCEDURE — 1101F PR PT FALLS ASSESS DOC 0-1 FALLS W/OUT INJ PAST YR: ICD-10-PCS | Mod: CPTII,S$GLB,, | Performed by: SPECIALIST

## 2023-10-09 PROCEDURE — 99212 PR OFFICE/OUTPT VISIT, EST, LEVL II, 10-19 MIN: ICD-10-PCS | Mod: S$GLB,,, | Performed by: SPECIALIST

## 2023-10-09 PROCEDURE — 1160F PR REVIEW ALL MEDS BY PRESCRIBER/CLIN PHARMACIST DOCUMENTED: ICD-10-PCS | Mod: CPTII,S$GLB,, | Performed by: SPECIALIST

## 2023-10-09 PROCEDURE — 1125F AMNT PAIN NOTED PAIN PRSNT: CPT | Mod: CPTII,S$GLB,, | Performed by: SPECIALIST

## 2023-10-09 PROCEDURE — 1160F RVW MEDS BY RX/DR IN RCRD: CPT | Mod: CPTII,S$GLB,, | Performed by: SPECIALIST

## 2023-10-09 PROCEDURE — 3288F FALL RISK ASSESSMENT DOCD: CPT | Mod: CPTII,S$GLB,, | Performed by: SPECIALIST

## 2023-10-09 PROCEDURE — 99999 PR PBB SHADOW E&M-EST. PATIENT-LVL III: ICD-10-PCS | Mod: PBBFAC,,, | Performed by: SPECIALIST

## 2023-10-09 PROCEDURE — 99999 PR PBB SHADOW E&M-EST. PATIENT-LVL III: CPT | Mod: PBBFAC,,, | Performed by: SPECIALIST

## 2023-10-09 PROCEDURE — 1125F PR PAIN SEVERITY QUANTIFIED, PAIN PRESENT: ICD-10-PCS | Mod: CPTII,S$GLB,, | Performed by: SPECIALIST

## 2023-10-09 PROCEDURE — 1101F PT FALLS ASSESS-DOCD LE1/YR: CPT | Mod: CPTII,S$GLB,, | Performed by: SPECIALIST

## 2023-10-09 PROCEDURE — 1159F MED LIST DOCD IN RCRD: CPT | Mod: CPTII,S$GLB,, | Performed by: SPECIALIST

## 2023-10-09 PROCEDURE — 3288F PR FALLS RISK ASSESSMENT DOCUMENTED: ICD-10-PCS | Mod: CPTII,S$GLB,, | Performed by: SPECIALIST

## 2023-10-09 NOTE — PROGRESS NOTES
89-year-old male with intermittent right groin discomfort   No nausea, vomiting      PE   Reducible right inguinal hernia    Impression/plan   Right inguinal hernia  Discussed with patient and family.  Family concerned about patient's age  Given relative symptom free nature of hernia patient and family wished to contemplate repair   Nature and purpose of procedure, signs and symptoms of incarceration outlined  RTC p.r.n.

## 2023-11-28 ENCOUNTER — OFFICE VISIT (OUTPATIENT)
Dept: INTERNAL MEDICINE | Facility: CLINIC | Age: 88
End: 2023-11-28
Attending: FAMILY MEDICINE
Payer: MEDICARE

## 2023-11-28 VITALS
OXYGEN SATURATION: 100 % | BODY MASS INDEX: 24.83 KG/M2 | HEART RATE: 71 BPM | WEIGHT: 158.19 LBS | SYSTOLIC BLOOD PRESSURE: 139 MMHG | HEIGHT: 67 IN | DIASTOLIC BLOOD PRESSURE: 78 MMHG

## 2023-11-28 DIAGNOSIS — I10 ESSENTIAL HYPERTENSION: Primary | ICD-10-CM

## 2023-11-28 DIAGNOSIS — R79.9 ABNORMAL FINDING OF BLOOD CHEMISTRY, UNSPECIFIED: ICD-10-CM

## 2023-11-28 DIAGNOSIS — M1A.09X0 IDIOPATHIC CHRONIC GOUT OF MULTIPLE SITES WITHOUT TOPHUS: ICD-10-CM

## 2023-11-28 PROCEDURE — 1101F PT FALLS ASSESS-DOCD LE1/YR: CPT | Mod: CPTII,S$GLB,, | Performed by: FAMILY MEDICINE

## 2023-11-28 PROCEDURE — 99214 OFFICE O/P EST MOD 30 MIN: CPT | Mod: S$GLB,,, | Performed by: FAMILY MEDICINE

## 2023-11-28 PROCEDURE — 3288F FALL RISK ASSESSMENT DOCD: CPT | Mod: CPTII,S$GLB,, | Performed by: FAMILY MEDICINE

## 2023-11-28 PROCEDURE — 99999 PR PBB SHADOW E&M-EST. PATIENT-LVL III: ICD-10-PCS | Mod: PBBFAC,,, | Performed by: FAMILY MEDICINE

## 2023-11-28 PROCEDURE — 3288F PR FALLS RISK ASSESSMENT DOCUMENTED: ICD-10-PCS | Mod: CPTII,S$GLB,, | Performed by: FAMILY MEDICINE

## 2023-11-28 PROCEDURE — 1125F AMNT PAIN NOTED PAIN PRSNT: CPT | Mod: CPTII,S$GLB,, | Performed by: FAMILY MEDICINE

## 2023-11-28 PROCEDURE — 1159F MED LIST DOCD IN RCRD: CPT | Mod: CPTII,S$GLB,, | Performed by: FAMILY MEDICINE

## 2023-11-28 PROCEDURE — 99999 PR PBB SHADOW E&M-EST. PATIENT-LVL III: CPT | Mod: PBBFAC,,, | Performed by: FAMILY MEDICINE

## 2023-11-28 PROCEDURE — 99214 PR OFFICE/OUTPT VISIT, EST, LEVL IV, 30-39 MIN: ICD-10-PCS | Mod: S$GLB,,, | Performed by: FAMILY MEDICINE

## 2023-11-28 PROCEDURE — 1101F PR PT FALLS ASSESS DOC 0-1 FALLS W/OUT INJ PAST YR: ICD-10-PCS | Mod: CPTII,S$GLB,, | Performed by: FAMILY MEDICINE

## 2023-11-28 PROCEDURE — 1159F PR MEDICATION LIST DOCUMENTED IN MEDICAL RECORD: ICD-10-PCS | Mod: CPTII,S$GLB,, | Performed by: FAMILY MEDICINE

## 2023-11-28 PROCEDURE — 1160F PR REVIEW ALL MEDS BY PRESCRIBER/CLIN PHARMACIST DOCUMENTED: ICD-10-PCS | Mod: CPTII,S$GLB,, | Performed by: FAMILY MEDICINE

## 2023-11-28 PROCEDURE — 1125F PR PAIN SEVERITY QUANTIFIED, PAIN PRESENT: ICD-10-PCS | Mod: CPTII,S$GLB,, | Performed by: FAMILY MEDICINE

## 2023-11-28 PROCEDURE — 1160F RVW MEDS BY RX/DR IN RCRD: CPT | Mod: CPTII,S$GLB,, | Performed by: FAMILY MEDICINE

## 2023-11-28 RX ORDER — LISINOPRIL 20 MG/1
20 TABLET ORAL DAILY
Qty: 90 TABLET | Refills: 3 | Status: SHIPPED | OUTPATIENT
Start: 2023-11-28

## 2023-11-28 NOTE — PROGRESS NOTES
"Subjective:       Patient ID: Viktor Townsend Sr. is a 89 y.o. male.     Chief Complaint:  Annual exam     Mr. Townsend is an 89 y/o M with a PMH of gout and HTN who has presented today for his annual exam. He feels good.    He has had no recurrent flare-ups in his right hand, his left elbow, and his left great toe since starting allopurinol. He takes allopurinol every day.     Mr. Townsend's blood pressure is well controlled at this itme.      Review of Systems   Constitutional: Negative for activity change, appetite change, chills and fever.   HENT: Negative for congestion, rhinorrhea and sneezing.    Respiratory: Negative for cough, chest tightness, shortness of breath and wheezing.    Cardiovascular: Negative for chest pain, palpitations and leg swelling.   Gastrointestinal: Negative for abdominal pain, blood in stool, diarrhea and vomiting.   Genitourinary: Negative for dysuria, flank pain and frequency.   Musculoskeletal: Positive for arthralgias, joint swelling and myalgias. Negative for neck pain and neck stiffness.   Skin: Negative for color change and pallor.   Neurological: Negative for dizziness, weakness, light-headedness and headaches.   Hematological: Negative for adenopathy. Does not bruise/bleed easily.   Psychiatric/Behavioral: Negative for agitation and behavioral problems.        Objective:    Blood pressure 139/78, pulse 71, height 5' 7" (1.702 m), weight 71.7 kg (158 lb 2.9 oz), SpO2 100 %.       Physical Exam   Constitutional: He is oriented to person, place He appears well-developed and well-nourished. No distress.   HENT:   Head: Normocephalic and atraumatic.   Eyes: EOM are normal. Pupils are equal, round, and reactive to light.   Neck: Normal range of motion. Neck supple.   Cardiovascular: Normal rate, regular rhythm, normal heart sounds and intact distal pulses.    Pulmonary/Chest: Effort normal and breath sounds normal. No respiratory distress. He has no wheezes.   Abdominal: Soft. Bowel " sounds are normal. He exhibits no distension. There is no tenderness.   Musculoskeletal: He exhibits no edema, tenderness or deformity.   Right shoulder range of motion limited.    Lymphadenopathy:     He has no cervical adenopathy.   Neurological: He is alert and oriented to person, place. No cranial nerve deficit.   Skin: Skin is warm and dry. Capillary refill takes less than 2 seconds. No rash noted. He is not diaphoretic. No erythema. No pallor.   Psychiatric: He has a normal mood and affect. His behavior is normal.       Assessment:       1. Annual  Chronic right shoulder pain    2. Hand edema    3. CKD (chronic kidney disease) stage 3, GFR 30-59 ml/min       hypertension, BP good today  Plan:       Mr. Townsend is an 89 y/o M with a PMH of gout and HTN who has presented today for investigation of recurrent episodes of gout as well as chronic right shoulder pain.     1. Gout  - Continue allopurinol 300mg tablet 1x per day     2. Right inguinal hernia  - Seen by Dr Melton  - Needs to schedule repair.  He's cleared for RIH repair.      3. HTN  - /80 today  lisinopril 20

## 2024-02-06 DIAGNOSIS — E78.00 PURE HYPERCHOLESTEROLEMIA: ICD-10-CM

## 2024-02-06 RX ORDER — PRAVASTATIN SODIUM 20 MG/1
20 TABLET ORAL DAILY
Qty: 90 TABLET | Refills: 0 | Status: SHIPPED | OUTPATIENT
Start: 2024-02-06

## 2024-02-06 NOTE — TELEPHONE ENCOUNTER
Refill Encounter    PCP Visits: Recent Visits  Date Type Provider Dept   11/28/23 Office Visit Kalia Jones MD [facility] Internal Medicine   04/19/23 Office Visit Kalia Jones MD [facility] Internal Medicine   Showing recent visits within past 360 days and meeting all other requirements  Future Appointments  No visits were found meeting these conditions.  Showing future appointments within next 720 days and meeting all other requirements     Last 3 Blood Pressure:   BP Readings from Last 3 Encounters:   11/28/23 139/78   10/09/23 (!) 165/81   09/14/23 (!) 147/74     Preferred Pharmacy:   [id number] [place] [address] [facility] [address]  Phone: [phone] Fax: [phone]    Requested RX:  Requested Prescriptions      No prescriptions requested or ordered in this encounter      RX Route: Normal

## 2024-02-06 NOTE — TELEPHONE ENCOUNTER
Care Due:                  Date            Visit Type   Department     Provider  --------------------------------------------------------------------------------                                MYCHART                              ANNUAL                              CHECKUP/PHY  [facility] INTERNAL  Kalia Gupta  Last Visit: 11-      [facility]  Next Visit: None Scheduled  None         None Found                                                            Last  Test          Frequency    Reason                     Performed    Due Date  --------------------------------------------------------------------------------    CBC.........  12 months..  allopurinoL..............  03- 02-    CMP.........  12 months..  allopurinoL, pravastatin.  04- 04-    Lipid Panel.  12 months..  pravastatin..............  Not Found    Overdue    Uric Acid...  12 months..  allopurinoL..............  Not Found    Overdue    [employer] Embedded Care Due Messages. Reference number: [id number].   2/06/2024 9:45:21 AM CST

## 2024-02-06 NOTE — TELEPHONE ENCOUNTER
Refill Routing Note   Medication(s) are not appropriate for processing by [facility] for the following reason(s):        Required labs outdated    [facility] action(s):  Defer   Requires labs : Yes             Appointments  past 12m or future 3m with PCP    Date Provider   Last Visit   11/28/2023 Kalia Jones MD   Next Visit   Visit date not found Kalia Jones MD   ED visits in past 90 days: 0        Note composed:9:57 AM 02/06/2024

## 2024-03-07 ENCOUNTER — TELEPHONE (OUTPATIENT)
Dept: INTERNAL MEDICINE | Facility: CLINIC | Age: 89
End: 2024-03-07
Payer: MEDICARE

## 2024-03-07 NOTE — TELEPHONE ENCOUNTER
----- Message from Khushi Chávez sent at 3/7/2024 12:25 PM CST -----  Regarding: Medication  Refill  Request            Reply in [facility]:   NO      Please refill the medication listed below. Please call the patient   [phone] (X)      Medication   lisinopriL (PRINIVIL,ZESTRIL) 20 MG tablet   / 90 day supply    Medication    pravastatin (PRAVACHOL) 20 MG tablet / 90 day supply        Preferred Pharmacy:  [id number] [address]   Phone: [phone]  Fax: [phone]

## 2024-03-07 NOTE — TELEPHONE ENCOUNTER
Spoke with the pharmacy who stated the pt Lisinopril has refills on hold and they will fill it. The pt has a 90 day prescription of Pravastatin sent in on 2/6/24. Pt wife was informed.

## 2024-03-21 ENCOUNTER — OFFICE VISIT (OUTPATIENT)
Dept: SURGERY | Facility: CLINIC | Age: 89
End: 2024-03-21
Attending: SPECIALIST
Payer: MEDICARE

## 2024-03-21 VITALS
HEIGHT: 67 IN | SYSTOLIC BLOOD PRESSURE: 201 MMHG | OXYGEN SATURATION: 100 % | HEART RATE: 69 BPM | BODY MASS INDEX: 24.81 KG/M2 | WEIGHT: 158.06 LBS | DIASTOLIC BLOOD PRESSURE: 79 MMHG

## 2024-03-21 DIAGNOSIS — K40.90 RIGHT INGUINAL HERNIA: Primary | ICD-10-CM

## 2024-03-21 PROCEDURE — 99999 PR PBB SHADOW E&M-EST. PATIENT-LVL III: CPT | Mod: PBBFAC,,, | Performed by: SPECIALIST

## 2024-03-21 PROCEDURE — 99213 OFFICE O/P EST LOW 20 MIN: CPT | Mod: S$GLB,,, | Performed by: SPECIALIST

## 2024-03-21 NOTE — PROGRESS NOTES
History & Physical    SUBJECTIVE:     History of Present Illness:  Patient is a 89 y.o. male presents with symptomatic right inguinal hernia for repair.  Patient's past medical history significant for hypertension, CKD 3.    Chief Complaint   Patient presents with    Hernia       Review of patient's allergies indicates:  No Known Allergies    Current Outpatient Medications   Medication Sig Dispense Refill    acetaminophen (TYLENOL) 325 MG tablet Take 325 mg by mouth every 6 (six) hours as needed for Pain.      allopurinoL (ZYLOPRIM) 100 MG tablet TAKE 1 TABLET(300 MG) BY MOUTH EVERY DAY 90 tablet 3    calcium carbonate (TUMS) 200 mg calcium (500 mg) chewable tablet Take 1 tablet by mouth 3 (three) times daily as needed for Heartburn.      cyanocobalamin, vitamin B-12, (VITAMIN B-12 ORAL) Take by mouth.      docusate sodium (COLACE) 100 MG capsule Take 1 capsule (100 mg total) by mouth 2 (two) times daily. 30 capsule 0    ergocalciferol (VITAMIN D2) 50,000 unit Cap Take 50,000 Units by mouth every 7 days.      HYDROcodone-acetaminophen (NORCO) 5-325 mg per tablet Take 1 tablet by mouth every 6 (six) hours as needed for Pain. 20 tablet 0    hydrocortisone 2.5 % cream Apply topically 2 (two) times daily. 28 g 0    lisinopriL (PRINIVIL,ZESTRIL) 20 MG tablet Take 1 tablet (20 mg total) by mouth once daily. 90 tablet 3    pravastatin (PRAVACHOL) 20 MG tablet Take 1 tablet (20 mg total) by mouth once daily. 90 tablet 0     No current facility-administered medications for this visit.       Past Medical History:   Diagnosis Date    Colon polyps     Hiatal hernia     Lumbar back pain     Personal history of asbestosis      Past Surgical History:   Procedure Laterality Date    CHOLECYSTECTOMY      EYE SURGERY      PROSTATE SURGERY       Family History   Problem Relation Age of Onset    Hyperlipidemia Father     Hypertension Father     Diabetes Father     No Known Problems Mother     Cancer Sister     Dementia Sister      "Diabetes Sister     No Known Problems Sister      Social History     Tobacco Use    Smoking status: Former     Passive exposure: Past    Smokeless tobacco: Never   Substance Use Topics    Alcohol use: No     Alcohol/week: 0.0 standard drinks of alcohol    Drug use: No        Review of Systems:  Review of Systems   Constitutional: Negative.  Negative for fatigue and fever.   HENT: Negative.  Negative for nosebleeds, sore throat and trouble swallowing.    Eyes: Negative.    Respiratory: Negative.     Cardiovascular: Negative.  Negative for chest pain.   Gastrointestinal: Negative.  Negative for abdominal distention and abdominal pain.   Genitourinary: Negative.         Intermittent right groin pain mass   Musculoskeletal:  Positive for arthralgias, back pain and gait problem.   Skin: Negative.    Psychiatric/Behavioral: Negative.         OBJECTIVE:     Vital Signs (Most Recent)  Pulse: 69 (03/21/24 1310)  BP: (!) 201/79 (03/21/24 1310)  SpO2: 100 % (03/21/24 1310)  5' 7" (1.702 m)  71.7 kg (158 lb 1.1 oz)     Physical Exam:  Physical Exam  Constitutional:       General: He is not in acute distress.     Appearance: He is well-developed and normal weight. He is not ill-appearing.   HENT:      Head: Normocephalic and atraumatic.      Right Ear: External ear normal.      Left Ear: External ear normal.   Eyes:      General: No scleral icterus.     Pupils: Pupils are equal, round, and reactive to light.   Cardiovascular:      Rate and Rhythm: Normal rate and regular rhythm.      Heart sounds: Normal heart sounds.   Pulmonary:      Effort: Pulmonary effort is normal. No respiratory distress.      Breath sounds: Normal breath sounds. No rhonchi or rales.   Abdominal:      General: Bowel sounds are normal.      Palpations: Abdomen is soft.      Hernia: A hernia is present.      Comments: Reducible right inguinal hernia   Musculoskeletal:         General: Normal range of motion.      Cervical back: Neck supple.   Skin:     " General: Skin is warm and dry.      Coloration: Skin is not jaundiced.   Neurological:      General: No focal deficit present.      Mental Status: He is alert and oriented to person, place, and time.      Gait: Gait abnormal.   Psychiatric:         Mood and Affect: Mood normal.         Behavior: Behavior normal.         Thought Content: Thought content normal.         Judgment: Judgment normal.         ASSESSMENT/PLAN:     Symptomatic right inguinal hernia for repair.  Consents reviewed with patient and signed

## 2024-04-02 ENCOUNTER — TELEPHONE (OUTPATIENT)
Dept: SURGERY | Facility: CLINIC | Age: 89
End: 2024-04-02
Payer: MEDICARE

## 2024-04-08 ENCOUNTER — ANESTHESIA EVENT (OUTPATIENT)
Dept: SURGERY | Facility: OTHER | Age: 89
End: 2024-04-08
Payer: MEDICARE

## 2024-04-08 RX ORDER — SODIUM CHLORIDE, SODIUM LACTATE, POTASSIUM CHLORIDE, CALCIUM CHLORIDE 600; 310; 30; 20 MG/100ML; MG/100ML; MG/100ML; MG/100ML
INJECTION, SOLUTION INTRAVENOUS CONTINUOUS
Status: CANCELLED | OUTPATIENT
Start: 2024-04-08

## 2024-04-08 RX ORDER — LIDOCAINE HYDROCHLORIDE 10 MG/ML
0.5 INJECTION, SOLUTION EPIDURAL; INFILTRATION; INTRACAUDAL; PERINEURAL ONCE
Status: CANCELLED | OUTPATIENT
Start: 2024-04-08 | End: 2024-04-08

## 2024-04-08 RX ORDER — ACETAMINOPHEN 500 MG
1000 TABLET ORAL
Status: CANCELLED | OUTPATIENT
Start: 2024-04-08 | End: 2024-04-08

## 2024-04-09 ENCOUNTER — HOSPITAL ENCOUNTER (OUTPATIENT)
Dept: PREADMISSION TESTING | Facility: OTHER | Age: 89
Discharge: HOME | End: 2024-04-09
Attending: SPECIALIST
Payer: MEDICARE

## 2024-04-09 ENCOUNTER — OFFICE VISIT (OUTPATIENT)
Dept: CARDIOLOGY | Facility: CLINIC | Age: 89
End: 2024-04-09
Payer: MEDICARE

## 2024-04-09 VITALS
DIASTOLIC BLOOD PRESSURE: 62 MMHG | BODY MASS INDEX: 26.21 KG/M2 | RESPIRATION RATE: 17 BRPM | WEIGHT: 167 LBS | HEIGHT: 67 IN | OXYGEN SATURATION: 99 % | SYSTOLIC BLOOD PRESSURE: 135 MMHG | TEMPERATURE: 98 F | HEART RATE: 70 BPM

## 2024-04-09 VITALS
BODY MASS INDEX: 24.9 KG/M2 | DIASTOLIC BLOOD PRESSURE: 72 MMHG | HEART RATE: 75 BPM | WEIGHT: 159 LBS | OXYGEN SATURATION: 99 % | SYSTOLIC BLOOD PRESSURE: 114 MMHG

## 2024-04-09 DIAGNOSIS — E78.00 PURE HYPERCHOLESTEROLEMIA: ICD-10-CM

## 2024-04-09 DIAGNOSIS — I48.91 ATRIAL FIBRILLATION, UNSPECIFIED TYPE: ICD-10-CM

## 2024-04-09 DIAGNOSIS — Z01.818 PREOP TESTING: ICD-10-CM

## 2024-04-09 DIAGNOSIS — I70.0 AORTIC ATHEROSCLEROSIS: ICD-10-CM

## 2024-04-09 DIAGNOSIS — I48.92 ATRIAL FLUTTER, UNSPECIFIED TYPE: Primary | ICD-10-CM

## 2024-04-09 DIAGNOSIS — I10 ESSENTIAL HYPERTENSION: ICD-10-CM

## 2024-04-09 DIAGNOSIS — Z01.818 PREOP TESTING: Primary | ICD-10-CM

## 2024-04-09 LAB
ANION GAP SERPL CALC-SCNC: 7 MMOL/L (ref 8–16)
BASOPHILS # BLD AUTO: 0.08 K/UL (ref 0–0.2)
BASOPHILS NFR BLD: 1.2 % (ref 0–1.9)
BUN SERPL-MCNC: 21 MG/DL (ref 8–23)
CALCIUM SERPL-MCNC: 9.5 MG/DL (ref 8.7–10.5)
CHLORIDE SERPL-SCNC: 106 MMOL/L (ref 95–110)
CO2 SERPL-SCNC: 27 MMOL/L (ref 23–29)
CREAT SERPL-MCNC: 1.8 MG/DL (ref 0.5–1.4)
DIFFERENTIAL METHOD BLD: ABNORMAL
EOSINOPHIL # BLD AUTO: 0.2 K/UL (ref 0–0.5)
EOSINOPHIL NFR BLD: 2.6 % (ref 0–8)
ERYTHROCYTE [DISTWIDTH] IN BLOOD BY AUTOMATED COUNT: 14.1 % (ref 11.5–14.5)
EST. GFR  (NO RACE VARIABLE): 35 ML/MIN/1.73 M^2
GLUCOSE SERPL-MCNC: 130 MG/DL (ref 70–110)
HCT VFR BLD AUTO: 39.8 % (ref 40–54)
HGB BLD-MCNC: 12.7 G/DL (ref 14–18)
IMM GRANULOCYTES # BLD AUTO: 0.02 K/UL (ref 0–0.04)
IMM GRANULOCYTES NFR BLD AUTO: 0.3 % (ref 0–0.5)
LYMPHOCYTES # BLD AUTO: 2 K/UL (ref 1–4.8)
LYMPHOCYTES NFR BLD: 30.1 % (ref 18–48)
MCH RBC QN AUTO: 29.2 PG (ref 27–31)
MCHC RBC AUTO-ENTMCNC: 31.9 G/DL (ref 32–36)
MCV RBC AUTO: 92 FL (ref 82–98)
MONOCYTES # BLD AUTO: 0.8 K/UL (ref 0.3–1)
MONOCYTES NFR BLD: 11.7 % (ref 4–15)
NEUTROPHILS # BLD AUTO: 3.5 K/UL (ref 1.8–7.7)
NEUTROPHILS NFR BLD: 54.1 % (ref 38–73)
NRBC BLD-RTO: 0 /100 WBC
PLATELET # BLD AUTO: 149 K/UL (ref 150–450)
PMV BLD AUTO: 10.3 FL (ref 9.2–12.9)
POTASSIUM SERPL-SCNC: 4.3 MMOL/L (ref 3.5–5.1)
RBC # BLD AUTO: 4.35 M/UL (ref 4.6–6.2)
SODIUM SERPL-SCNC: 140 MMOL/L (ref 136–145)
WBC # BLD AUTO: 6.52 K/UL (ref 3.9–12.7)

## 2024-04-09 PROCEDURE — 3288F FALL RISK ASSESSMENT DOCD: CPT | Mod: CPTII,S$GLB,, | Performed by: INTERNAL MEDICINE

## 2024-04-09 PROCEDURE — 36415 COLL VENOUS BLD VENIPUNCTURE: CPT | Performed by: ANESTHESIOLOGY

## 2024-04-09 PROCEDURE — 93005 ELECTROCARDIOGRAM TRACING: CPT

## 2024-04-09 PROCEDURE — 1126F AMNT PAIN NOTED NONE PRSNT: CPT | Mod: CPTII,S$GLB,, | Performed by: INTERNAL MEDICINE

## 2024-04-09 PROCEDURE — 1101F PT FALLS ASSESS-DOCD LE1/YR: CPT | Mod: CPTII,S$GLB,, | Performed by: INTERNAL MEDICINE

## 2024-04-09 PROCEDURE — 99999 PR PBB SHADOW E&M-EST. PATIENT-LVL III: CPT | Mod: PBBFAC,,, | Performed by: INTERNAL MEDICINE

## 2024-04-09 PROCEDURE — 93010 ELECTROCARDIOGRAM REPORT: CPT | Mod: ,,, | Performed by: INTERNAL MEDICINE

## 2024-04-09 PROCEDURE — 85025 COMPLETE CBC W/AUTO DIFF WBC: CPT | Performed by: ANESTHESIOLOGY

## 2024-04-09 PROCEDURE — 1159F MED LIST DOCD IN RCRD: CPT | Mod: CPTII,S$GLB,, | Performed by: INTERNAL MEDICINE

## 2024-04-09 PROCEDURE — 99214 OFFICE O/P EST MOD 30 MIN: CPT | Mod: 25,S$GLB,, | Performed by: INTERNAL MEDICINE

## 2024-04-09 PROCEDURE — 80048 BASIC METABOLIC PNL TOTAL CA: CPT | Performed by: ANESTHESIOLOGY

## 2024-04-09 NOTE — DISCHARGE INSTRUCTIONS
Information to Prepare you for your Surgery    PRE-ADMIT TESTING -  [phone]    [address]  [facility]          Your surgery has been scheduled at [facility]. We are pleased to have the opportunity to serve you. For Further Information please call [phone].    On the day of surgery please report to the Information Desk on the 1st floor.    CONTACT YOUR PHYSICIAN'S OFFICE THE DAY PRIOR TO YOUR SURGERY TO OBTAIN YOUR ARRIVAL TIME.     The evening before surgery do not eat anything after 9 p.m. ( this includes hard candy, chewing gum and mints).  You may only have GATORADE, POWERADE AND WATER  from 9 p.m. until you leave your home.   DO NOT DRINK ANY LIQUIDS ON THE WAY TO THE HOSPITAL.      Why does your anesthesiologist allow you to drink Gatorade/Powerade before surgery?  Gatorade/Powerade helps to increase your comfort before surgery and to decrease your nausea after surgery. The carbohydrates in Gatorade/Powerade help reduce your body's stress response to surgery.  If you are a diabetic-drink only water prior to surgery.    Outpatient Surgery- May allow 2 adult (18 and older) Support Persons (1 being the designated ) for all surgical/procedural patients. A breastfeeding mother will be allowed her infant and 2 adult Support Persons. No one under the age of 18 will be allowed in the building. No swapping out of visitors in the [facility].      SPECIAL MEDICATION INSTRUCTIONS: TAKE medications checked off by the Anesthesiologist on your Medication List.    Angiogram Patients: Take medications as instructed by your physician, including aspirin.     Surgery Patients:    If you take ASPIRIN - Your PHYSICIAN/SURGEON will need to inform you IF/OR when you need to stop taking aspirin prior to your surgery.     The week prior to surgery do not ot take any medications containing IBUPROFEN or NSAIDS ( Advil, Motrin, Goodys, BC, Aleve, Naproxen etc)  If you are not sure if you should take a medicine please call your surgeon's office.  Ok to take Tylenol    Do Not Wear any make-up (especially eye make-up) to surgery. Please remove any false eyelashes or eyelash extensions. If you arrive the day of surgery with makeup/eyelashes on you will be required to remove prior to surgery. (There is a risk of corneal abrasions if eye makeup/eyelash extensions are not removed)      Leave all valuables at home.   Do Not wear any jewelry or watches, including any metal in body piercings. Jewelry must be removed prior to coming to the hospital.  There is a possibility that rings that are unable to be removed may be cut off if they are on the surgical extremity.    Please remove all hair extensions, wigs, clips and any other metal accessories/ ornaments from your hair.  These items may pose a flammable/fire risk in Surgery and must be removed.    Do not shave your surgical area at least 5 days prior to your surgery. The surgical prep will be performed at the hospital according to Infection Control regulations.    Contact Lens must be removed before surgery. Either do not wear the contact lens or bring a case and solution for storage.  Please bring a container for eyeglasses or dentures as required.  Bring any paperwork your physician has provided, such as consent forms,  history and physicals, doctor's orders, etc.   Bring comfortable clothes that are loose fitting to wear upon discharge. Take into consideration the type of surgery being performed.  Maintain your diet as advised per your physician the day prior to surgery.      Adequate rest the night before surgery is advised.   Park in the Parking lot behind the hospital or in the [facility] across the street from the parking lot. Parking is complimentary.  If you will be discharged the same day as your procedure, please arrange for a responsible adult to drive you home or to accompany you if traveling by taxi.    YOU WILL NOT BE PERMITTED TO DRIVE OR TO LEAVE THE HOSPITAL ALONE AFTER SURGERY.   If you are being discharged the same day, it is strongly recommended that you arrange for someone to remain with you for the first 24 hrs following your surgery.    The Surgeon will speak to your family/visitor after your surgery regarding the outcome of your surgery and post op care.  The Surgeon may speak to you after your surgery, but there is a possibility you may not remember the details.  Please check with your family members regarding the conversation with the Surgeon.    We strongly recommend whoever is bringing you home be present for discharge instructions.  This will ensure a thorough understanding for your post op home care.          Thank you for your cooperation.  The Staff of [facility].            Bathing Instructions with Hibiclens    Shower the evening before and morning of your procedure with Chlorhexidine (Hibiclens)  do not use Chlorhexidine on your face or genitals. Do not get in your eyes.  Wash your face with water and your regular face wash/soap  Use your regular shampoo  Apply Chlorhexidine (Hibiclens) directly on your skin or on a wet washcloth and wash gently. When showering: Move away from the shower stream when applying Chlorhexidine (Hibiclens) to avoid rinsing off too soon.  Rinse thoroughly with warm water  Do not dilute Chlorhexidine (Hibiclens)   Dry off as usual, do not use any deodorant, powder, body lotions, perfume, after shave or cologne.

## 2024-04-09 NOTE — PROGRESS NOTES
Cardiology    4/9/2024  1:11 PM    Problem list  Patient Active Problem List   Diagnosis    Essential hypertension    Personal history of prostate cancer    Colon polyps    Hoarseness    Hiatal hernia    Gout, unspecified    Trigger finger, acquired    Diverticulosis    Hyperlipidemia    Prediabetes    CKD (chronic kidney disease) stage 3, GFR 30-59 ml/min    Left inguinal hernia    Right shoulder pain    Hypertensive kidney disease with stage 3 chronic kidney disease    Paraproteinemia    MGUS (monoclonal gammopathy of unknown significance)    Anemia    B12 deficiency    Disease of spinal cord, unspecified    Aortic atherosclerosis    Spinal stenosis of lumbar region without neurogenic claudication    Back pain    Atrial flutter       CC:  Same day appointment for abnormal EKG    HPI:    Patient was given same-day appointment to evaluate his EKG.  He had a preop EKG done for his elective right inguinal hernia repair which is scheduled in 3 days.  His EKG showed atrial fibrillation with HR 69.   He denies any chest pain, shortness breath, palpitation or syncope.  He denies any bleeding.  He denies any prior TIA or CVA.    Medications  Current Outpatient Medications   Medication Sig Dispense Refill    acetaminophen (TYLENOL) 325 MG tablet Take 325 mg by mouth every 6 (six) hours as needed for Pain.      allopurinoL (ZYLOPRIM) 100 MG tablet TAKE 1 TABLET(300 MG) BY MOUTH EVERY DAY 90 tablet 3    calcium carbonate (TUMS) 200 mg calcium (500 mg) chewable tablet Take 1 tablet by mouth 3 (three) times daily as needed for Heartburn.      cyanocobalamin, vitamin B-12, (VITAMIN B-12 ORAL) Take by mouth.      diphenhydrAMINE-acetaminophen (TYLENOL PM)  mg Tab Take 1 tablet by mouth nightly as needed.      docusate sodium (COLACE) 100 MG capsule Take 1 capsule (100 mg total) by mouth 2 (two) times daily. 30 capsule 0    ergocalciferol (VITAMIN D2) 50,000 unit Cap Take 50,000 Units by mouth every 7 days.       HYDROcodone-acetaminophen (NORCO) 5-325 mg per tablet Take 1 tablet by mouth every 6 (six) hours as needed for Pain. 20 tablet 0    hydrocortisone 2.5 % cream Apply topically 2 (two) times daily. 28 g 0    lisinopriL (PRINIVIL,ZESTRIL) 20 MG tablet Take 1 tablet (20 mg total) by mouth once daily. 90 tablet 3    pravastatin (PRAVACHOL) 20 MG tablet Take 1 tablet (20 mg total) by mouth once daily. 90 tablet 0     No current facility-administered medications for this visit.      Prior to Admission medications    Medication Sig Start Date End Date Taking? Authorizing Provider   acetaminophen (TYLENOL) 325 MG tablet Take 325 mg by mouth every 6 (six) hours as needed for Pain.   Yes Provider, Historical   allopurinoL (ZYLOPRIM) 100 MG tablet TAKE 1 TABLET(300 MG) BY MOUTH EVERY DAY 8/14/23  Yes Kalia Jones MD   calcium carbonate (TUMS) 200 mg calcium (500 mg) chewable tablet Take 1 tablet by mouth 3 (three) times daily as needed for Heartburn.   Yes Provider, Historical   cyanocobalamin, vitamin B-12, (VITAMIN B-12 ORAL) Take by mouth.   Yes Provider, Historical   diphenhydrAMINE-acetaminophen (TYLENOL PM)  mg Tab Take 1 tablet by mouth nightly as needed.   Yes Provider, Historical   docusate sodium (COLACE) 100 MG capsule Take 1 capsule (100 mg total) by mouth 2 (two) times daily. 9/8/23  Yes Renetta Rios PA-C   ergocalciferol (VITAMIN D2) 50,000 unit Cap Take 50,000 Units by mouth every 7 days.   Yes Provider, Historical   HYDROcodone-acetaminophen (NORCO) 5-325 mg per tablet Take 1 tablet by mouth every 6 (six) hours as needed for Pain. 9/14/23  Yes Trevor Larry MD   hydrocortisone 2.5 % cream Apply topically 2 (two) times daily. 5/7/21  Yes Renetta Rios PA-C   lisinopriL (PRINIVIL,ZESTRIL) 20 MG tablet Take 1 tablet (20 mg total) by mouth once daily. 11/28/23  Yes Kalia Jones MD   pravastatin (PRAVACHOL) 20 MG tablet Take 1 tablet (20 mg total) by mouth once daily.  2/6/24  Yes Kalia Jones MD         History  Past Medical History:   Diagnosis Date    Colon polyps     Hiatal hernia     Hypertension     Lumbar back pain     Personal history of asbestosis      Past Surgical History:   Procedure Laterality Date    CHOLECYSTECTOMY      EYE SURGERY      PROSTATE SURGERY       Social History     Socioeconomic History    Marital status:    Occupational History    Occupation: Retired    Tobacco Use    Smoking status: Former     Passive exposure: Past    Smokeless tobacco: Never   Substance and Sexual Activity    Alcohol use: No     Alcohol/week: 0.0 standard drinks of alcohol    Drug use: No    Sexual activity: Yes     Partners: Female   Social History Narrative    Lives w/wife.  One adult son also lives at home.  Two adult daughters live out of state.           Allergies  Review of patient's allergies indicates:  No Known Allergies      Review of Systems   Review of Systems   Constitutional: Negative for decreased appetite, fever and weight loss.   HENT:  Negative for congestion and nosebleeds.    Eyes:  Negative for double vision, vision loss in left eye, vision loss in right eye and visual disturbance.   Cardiovascular:  Negative for chest pain, claudication, cyanosis, dyspnea on exertion, irregular heartbeat, leg swelling, near-syncope, orthopnea, palpitations, paroxysmal nocturnal dyspnea and syncope.   Respiratory:  Negative for cough, hemoptysis, shortness of breath, sleep disturbances due to breathing, snoring, sputum production and wheezing.    Endocrine: Negative for cold intolerance and heat intolerance.   Skin:  Negative for nail changes and rash.   Musculoskeletal:  Negative for joint pain, muscle cramps, muscle weakness and myalgias.   Gastrointestinal:  Negative for change in bowel habit, heartburn, hematemesis, hematochezia, hemorrhoids and melena.   Neurological:  Negative for dizziness, focal weakness and headaches.         Physical  Exam  Wt Readings from Last 1 Encounters:   04/09/24 72.1 kg (159 lb)     BP Readings from Last 3 Encounters:   04/09/24 114/72   04/09/24 135/62   03/21/24 (!) 201/79     Pulse Readings from Last 1 Encounters:   04/09/24 75     Body mass index is 24.9 kg/m².    Physical Exam  Constitutional:       Appearance: He is well-developed.   HENT:      Head: Atraumatic.   Eyes:      General: No scleral icterus.  Neck:      Vascular: Normal carotid pulses. No carotid bruit, hepatojugular reflux or JVD.   Cardiovascular:      Rate and Rhythm: Normal rate. Rhythm irregular.      Chest Wall: PMI is not displaced.      Pulses: Intact distal pulses.           Carotid pulses are 2+ on the right side and 2+ on the left side.       Radial pulses are 2+ on the right side and 2+ on the left side.        Dorsalis pedis pulses are 2+ on the right side and 2+ on the left side.      Heart sounds: Normal heart sounds, S1 normal and S2 normal. No murmur heard.     No friction rub.   Pulmonary:      Effort: Pulmonary effort is normal. No respiratory distress.      Breath sounds: Normal breath sounds. No stridor. No wheezing or rales.   Chest:      Chest wall: No tenderness.   Abdominal:      General: Bowel sounds are normal.      Palpations: Abdomen is soft.   Musculoskeletal:      Cervical back: Neck supple. No edema.   Skin:     General: Skin is warm and dry.      Nails: There is no clubbing.   Neurological:      Mental Status: He is alert and oriented to person, place, and time.   Psychiatric:         Behavior: Behavior normal.         Thought Content: Thought content normal.             Assessment  1. Aortic atherosclerosis    Stable    2. Atrial fibrillation, [id number] = 4   Rate controlled.  Not on any AV yanet blocking medications.  Will need anticoagulation  with Eliquis 5 mg twice daily  which will be started after his surgery in 3 days  - Echo; Future  - Holter monitor - 24 hour; Future    3. Essential hypertension   Well  controlled    4. Pure hypercholesterolemia   stable        Plan and Discussion   Discussed his new onset atrial fibrillation with controlled ventricular response with a heart rate of 69.  Discussed his increased CHADS-VASc score of 4 indicating benefit from stroke prophylaxis with anticoagulation.  Will start Eliquis once he is cleared from his surgery.  Will get an echocardiogram and Holter monitor.    Follow Up  4-6 weeks.      Albin Mercedes MD, F.A.C.C, F.S.C.A.I.      Total professional time spent for the encounter: 40 minutes  Time was spent preparing to see the patient, reviewing results of prior testing, obtaining and/or reviewing separately obtained history, performing a medically appropriate examination and interview, counseling and educating the patient/family, ordering medications/tests/procedures, referring and communicating with other health care professionals, documenting clinical information in the electronic health record, and independently interpreting results.    Disclaimer: This document was created using voice recognition software ([employer] Flu[employer]). Although it may be edited, this document may contain errors related to incorrect recognition of the spoken word. Please call the physician if clarification is needed.

## 2024-04-09 NOTE — ANESTHESIA PREPROCEDURE EVALUATION
04/09/2024  Viktor Townsend . is a 89 y.o., male.    Anesthesia Evaluation    I have reviewed the Patient Summary Reports.     I have reviewed the Nursing Notes. I have reviewed the NPO Status.   I have reviewed the Medications.     Review of Systems  Anesthesia Hx:             Denies Family Hx of Anesthesia complications.    Denies Personal Hx of Anesthesia complications.                    Social:  Non-Smoker       Hematology/Oncology:  Hematology Normal                       --  Cancer in past history (prostate):                     EENT/Dental:  EENT/Dental Normal           Cardiovascular:  Exercise tolerance: good   Hypertension           hyperlipidemia                             Pulmonary:  Pulmonary Normal                       Renal/:  Chronic Renal Disease, CKD                Hepatic/GI:    Hiatal Hernia, GERD (prn otc rx)             Musculoskeletal:  Arthritis               Neurological:  Neurology Normal                                      Endocrine:  Endocrine Normal    Past hx gout        Dermatological:  Skin Normal    Psych:  Psychiatric Normal                    Physical Exam  General:  Well nourished       Airway/Jaw/Neck:  Airway Findings: Mouth Opening: Normal              Mallampati: I   TM Distance: Normal, at least 6 cm   Jaw/Neck Findings:     Neck ROM: Extension Decreased, Mod.          Dental:  Dental Findings: Upper Dentures, Lower Dentures           Mental Status:  Mental Status Findings:  Cooperative, Alert and Oriented         Anesthesia Plan  Type of Anesthesia, risks & benefits discussed:  Anesthesia Type:  regional    Patient's Preference:   Plan Factors:          Intra-op Monitoring Plan: standard ASA monitors  Intra-op Monitoring Plan Comments:   Post Op Pain Control Plan:   Post Op Pain Control Plan Comments:     Induction:   IV  Beta Blocker:         Informed  Consent: Informed consent signed with the Patient and all parties understand the risks and agree with anesthesia plan.  All questions answered.  Anesthesia consent signed with patient.  ASA Score: 3     Day of Surgery Review of History & Physical:        Anesthesia Plan Notes: Labs EKG ordered  Regional with IV sedation discussed  Prev LIH under [facility] in 2017    Addendum- EKG shows A flutter which is new on records- has appt with Dr Mercedes for cardiac clearance scheduled    Dr Mercedes cleared for surgery with plans for postop Eliquis and follow up        Ready For Surgery From Anesthesia Perspective.             Physical Exam  General: Well nourished    Airway:  Mallampati: I   Mouth Opening: Normal  TM Distance: Normal, at least 6 cm  Neck ROM: Extension Decreased, Mod.    Dental:  Upper Dentures, Lower Dentures        Anesthesia Plan  Type of Anesthesia, risks & benefits discussed:    Anesthesia Type: regional  Intra-op Monitoring Plan: standard ASA monitors  Induction:  IV  Informed Consent: Informed consent signed with the Patient and all parties understand the risks and agree with anesthesia plan.  All questions answered.   ASA Score: 3  Anesthesia Plan Notes: Labs EKG ordered  Regional with IV sedation discussed  Prev LIH under [facility] in 2017    Addendum- EKG shows A flutter which is new on records- has appt with Dr Mercedes for cardiac clearance scheduled    Dr Mercedes cleared for surgery with plans for postop Eliquis and follow up    Ready For Surgery From Anesthesia Perspective.     .

## 2024-04-09 NOTE — PRE ADMISSION SCREENING
Dr. Marshall reviewed EKG; need for patient to see cardiology prior to surgery. Patient was able to be seen by Dr. Mercedes, cardiology at 1300 for surgery with Dr. Melton on 4/12/24. See Anesthesia note.

## 2024-04-10 LAB
OHS QRS DURATION: 82 MS
OHS QTC CALCULATION: 428 MS

## 2024-04-12 ENCOUNTER — ANESTHESIA (OUTPATIENT)
Dept: SURGERY | Facility: OTHER | Age: 89
End: 2024-04-12
Payer: MEDICARE

## 2024-04-12 ENCOUNTER — HOSPITAL ENCOUNTER (OUTPATIENT)
Facility: OTHER | Age: 89
Discharge: HOME | End: 2024-04-12
Attending: SPECIALIST | Admitting: SPECIALIST
Payer: MEDICARE

## 2024-04-12 DIAGNOSIS — K40.90 RIGHT INGUINAL HERNIA: ICD-10-CM

## 2024-04-12 PROCEDURE — 71000033 HC RECOVERY, INTIAL HOUR: Performed by: SPECIALIST

## 2024-04-12 PROCEDURE — 63600175 PHARM REV CODE 636 W HCPCS: Performed by: SPECIALIST

## 2024-04-12 PROCEDURE — 49505 PRP I/HERN INIT REDUC >5 YR: CPT | Mod: RT,,, | Performed by: SPECIALIST

## 2024-04-12 PROCEDURE — 25000242 PHARM REV CODE 250 ALT 637 W/ HCPCS: Performed by: ANESTHESIOLOGY

## 2024-04-12 PROCEDURE — 36000707: Performed by: SPECIALIST

## 2024-04-12 PROCEDURE — 63600175 PHARM REV CODE 636 W HCPCS

## 2024-04-12 PROCEDURE — 37000009 HC ANESTHESIA EA ADD 15 MINS: Performed by: SPECIALIST

## 2024-04-12 PROCEDURE — D9220A PRA ANESTHESIA: Mod: ANES,,, | Performed by: ANESTHESIOLOGY

## 2024-04-12 PROCEDURE — 71000015 HC POSTOP RECOV 1ST HR: Performed by: SPECIALIST

## 2024-04-12 PROCEDURE — 25000003 PHARM REV CODE 250: Performed by: ANESTHESIOLOGY

## 2024-04-12 PROCEDURE — 63600175 PHARM REV CODE 636 W HCPCS: Performed by: ANESTHESIOLOGY

## 2024-04-12 PROCEDURE — D9220A PRA ANESTHESIA: Mod: CRNA,,,

## 2024-04-12 PROCEDURE — 71000016 HC POSTOP RECOV ADDL HR: Performed by: SPECIALIST

## 2024-04-12 PROCEDURE — C9290 INJ, BUPIVACAINE LIPOSOME: HCPCS | Performed by: SPECIALIST

## 2024-04-12 PROCEDURE — 25000003 PHARM REV CODE 250: Performed by: SPECIALIST

## 2024-04-12 PROCEDURE — 76942 ECHO GUIDE FOR BIOPSY: CPT | Performed by: ANESTHESIOLOGY

## 2024-04-12 PROCEDURE — C1781 MESH (IMPLANTABLE): HCPCS | Performed by: SPECIALIST

## 2024-04-12 PROCEDURE — 37000008 HC ANESTHESIA 1ST 15 MINUTES: Performed by: SPECIALIST

## 2024-04-12 PROCEDURE — C1729 CATH, DRAINAGE: HCPCS | Performed by: SPECIALIST

## 2024-04-12 PROCEDURE — 36000706: Performed by: SPECIALIST

## 2024-04-12 PROCEDURE — 25000003 PHARM REV CODE 250

## 2024-04-12 DEVICE — MESH SOFT PROLENE WVN 3X6: Type: IMPLANTABLE DEVICE | Site: GROIN | Status: FUNCTIONAL

## 2024-04-12 RX ORDER — MEPERIDINE HYDROCHLORIDE 25 MG/ML
12.5 INJECTION INTRAMUSCULAR; INTRAVENOUS; SUBCUTANEOUS ONCE AS NEEDED
Status: DISCONTINUED | OUTPATIENT
Start: 2024-04-12 | End: 2024-04-12 | Stop reason: HOSPADM

## 2024-04-12 RX ORDER — BUPIVACAINE HCL/EPINEPHRINE 0.25-.0005
VIAL (ML) INJECTION
Status: DISCONTINUED | OUTPATIENT
Start: 2024-04-12 | End: 2024-04-12 | Stop reason: HOSPADM

## 2024-04-12 RX ORDER — OXYCODONE HYDROCHLORIDE 5 MG/1
5 TABLET ORAL
Status: DISCONTINUED | OUTPATIENT
Start: 2024-04-12 | End: 2024-04-12 | Stop reason: HOSPADM

## 2024-04-12 RX ORDER — LIDOCAINE HYDROCHLORIDE 10 MG/ML
1 INJECTION, SOLUTION EPIDURAL; INFILTRATION; INTRACAUDAL; PERINEURAL ONCE
Status: DISCONTINUED | OUTPATIENT
Start: 2024-04-12 | End: 2024-04-12 | Stop reason: HOSPADM

## 2024-04-12 RX ORDER — PHENYLEPHRINE HYDROCHLORIDE 10 MG/ML
INJECTION INTRAVENOUS
Status: DISCONTINUED | OUTPATIENT
Start: 2024-04-12 | End: 2024-04-12

## 2024-04-12 RX ORDER — LEVALBUTEROL INHALATION SOLUTION 0.63 MG/3ML
0.63 SOLUTION RESPIRATORY (INHALATION) ONCE AS NEEDED
Status: COMPLETED | OUTPATIENT
Start: 2024-04-12 | End: 2024-04-12

## 2024-04-12 RX ORDER — ONDANSETRON HYDROCHLORIDE 2 MG/ML
INJECTION, SOLUTION INTRAVENOUS
Status: DISCONTINUED | OUTPATIENT
Start: 2024-04-12 | End: 2024-04-12

## 2024-04-12 RX ORDER — SODIUM CHLORIDE 0.9 % (FLUSH) 0.9 %
3 SYRINGE (ML) INJECTION
Status: DISCONTINUED | OUTPATIENT
Start: 2024-04-12 | End: 2024-04-12 | Stop reason: HOSPADM

## 2024-04-12 RX ORDER — PROCHLORPERAZINE EDISYLATE 5 MG/ML
5 INJECTION INTRAMUSCULAR; INTRAVENOUS EVERY 30 MIN PRN
Status: DISCONTINUED | OUTPATIENT
Start: 2024-04-12 | End: 2024-04-12 | Stop reason: HOSPADM

## 2024-04-12 RX ORDER — SODIUM CHLORIDE 9 MG/ML
INJECTION, SOLUTION INTRAVENOUS CONTINUOUS
Status: DISCONTINUED | OUTPATIENT
Start: 2024-04-12 | End: 2024-04-12 | Stop reason: HOSPADM

## 2024-04-12 RX ORDER — HYDROMORPHONE HYDROCHLORIDE 2 MG/ML
0.4 INJECTION, SOLUTION INTRAMUSCULAR; INTRAVENOUS; SUBCUTANEOUS EVERY 5 MIN PRN
Status: DISCONTINUED | OUTPATIENT
Start: 2024-04-12 | End: 2024-04-12 | Stop reason: HOSPADM

## 2024-04-12 RX ORDER — CEFAZOLIN SODIUM 1 G/3ML
2 INJECTION, POWDER, FOR SOLUTION INTRAMUSCULAR; INTRAVENOUS
Status: COMPLETED | OUTPATIENT
Start: 2024-04-12 | End: 2024-04-12

## 2024-04-12 RX ORDER — ACETAMINOPHEN 500 MG
1000 TABLET ORAL
Status: COMPLETED | OUTPATIENT
Start: 2024-04-12 | End: 2024-04-12

## 2024-04-12 RX ORDER — ROPIVACAINE HYDROCHLORIDE 5 MG/ML
INJECTION, SOLUTION EPIDURAL; INFILTRATION; PERINEURAL
Status: COMPLETED | OUTPATIENT
Start: 2024-04-12 | End: 2024-04-12

## 2024-04-12 RX ORDER — PROPOFOL 10 MG/ML
VIAL (ML) INTRAVENOUS CONTINUOUS PRN
Status: DISCONTINUED | OUTPATIENT
Start: 2024-04-12 | End: 2024-04-12

## 2024-04-12 RX ORDER — SODIUM CHLORIDE, SODIUM LACTATE, POTASSIUM CHLORIDE, CALCIUM CHLORIDE 600; 310; 30; 20 MG/100ML; MG/100ML; MG/100ML; MG/100ML
INJECTION, SOLUTION INTRAVENOUS CONTINUOUS
Status: DISCONTINUED | OUTPATIENT
Start: 2024-04-12 | End: 2024-04-12 | Stop reason: HOSPADM

## 2024-04-12 RX ORDER — HYDROCODONE BITARTRATE AND ACETAMINOPHEN 5; 325 MG/1; MG/1
1 TABLET ORAL EVERY 6 HOURS PRN
Qty: 20 TABLET | Refills: 0 | Status: SHIPPED | OUTPATIENT
Start: 2024-04-12 | End: 2024-04-22 | Stop reason: SDUPTHER

## 2024-04-12 RX ORDER — PROPOFOL 10 MG/ML
VIAL (ML) INTRAVENOUS
Status: DISCONTINUED | OUTPATIENT
Start: 2024-04-12 | End: 2024-04-12

## 2024-04-12 RX ORDER — LIDOCAINE HYDROCHLORIDE 10 MG/ML
INJECTION, SOLUTION INTRAVENOUS
Status: DISCONTINUED | OUTPATIENT
Start: 2024-04-12 | End: 2024-04-12

## 2024-04-12 RX ORDER — FENTANYL CITRATE 50 UG/ML
INJECTION, SOLUTION INTRAMUSCULAR; INTRAVENOUS
Status: DISCONTINUED | OUTPATIENT
Start: 2024-04-12 | End: 2024-04-12

## 2024-04-12 RX ORDER — LIDOCAINE HYDROCHLORIDE 10 MG/ML
0.5 INJECTION, SOLUTION EPIDURAL; INFILTRATION; INTRACAUDAL; PERINEURAL ONCE
Status: DISCONTINUED | OUTPATIENT
Start: 2024-04-12 | End: 2024-04-12 | Stop reason: HOSPADM

## 2024-04-12 RX ADMIN — ROPIVACAINE HYDROCHLORIDE 20 ML: 5 INJECTION, SOLUTION EPIDURAL; INFILTRATION; PERINEURAL at 06:04

## 2024-04-12 RX ADMIN — PROPOFOL 50 MCG/KG/MIN: 10 INJECTION, EMULSION INTRAVENOUS at 07:04

## 2024-04-12 RX ADMIN — HYDROMORPHONE HYDROCHLORIDE 0.2 MG: 2 INJECTION INTRAMUSCULAR; INTRAVENOUS; SUBCUTANEOUS at 09:04

## 2024-04-12 RX ADMIN — LEVALBUTEROL HYDROCHLORIDE 0.63 MG: 0.63 SOLUTION RESPIRATORY (INHALATION) at 09:04

## 2024-04-12 RX ADMIN — FENTANYL CITRATE 50 MCG: 50 INJECTION, SOLUTION INTRAMUSCULAR; INTRAVENOUS at 07:04

## 2024-04-12 RX ADMIN — LIDOCAINE HYDROCHLORIDE 20 MG: 10 INJECTION, SOLUTION INTRAVENOUS at 07:04

## 2024-04-12 RX ADMIN — ACETAMINOPHEN 1000 MG: 500 TABLET ORAL at 06:04

## 2024-04-12 RX ADMIN — ONDANSETRON HYDROCHLORIDE 4 MG: 2 INJECTION INTRAMUSCULAR; INTRAVENOUS at 07:04

## 2024-04-12 RX ADMIN — PHENYLEPHRINE HYDROCHLORIDE 100 MCG: 10 INJECTION INTRAVENOUS at 08:04

## 2024-04-12 RX ADMIN — PROPOFOL 30 MG: 10 INJECTION, EMULSION INTRAVENOUS at 08:04

## 2024-04-12 RX ADMIN — OXYCODONE 5 MG: 5 TABLET ORAL at 08:04

## 2024-04-12 RX ADMIN — FENTANYL CITRATE 50 MCG: 50 INJECTION, SOLUTION INTRAMUSCULAR; INTRAVENOUS at 06:04

## 2024-04-12 RX ADMIN — PHENYLEPHRINE HYDROCHLORIDE 100 MCG: 10 INJECTION INTRAVENOUS at 07:04

## 2024-04-12 RX ADMIN — SODIUM CHLORIDE, SODIUM LACTATE, POTASSIUM CHLORIDE, AND CALCIUM CHLORIDE: .6; .31; .03; .02 INJECTION, SOLUTION INTRAVENOUS at 06:04

## 2024-04-12 RX ADMIN — PROPOFOL 30 MG: 10 INJECTION, EMULSION INTRAVENOUS at 07:04

## 2024-04-12 RX ADMIN — CEFAZOLIN 2 G: 330 INJECTION, POWDER, FOR SOLUTION INTRAMUSCULAR; INTRAVENOUS at 07:04

## 2024-04-12 NOTE — DISCHARGE INSTRUCTIONS
Your Surgeon Gave You EXPAREL® (bupivacaine liposome injectable suspension)    To help control your pain after surgery, your surgeon injected EXPAREL into your surgical incision just before the end of the procedure.   EXPAREL is a local analgesic that contains the local anesthetic bupivacaine. Local anesthetics provide pain relief by numbing the tissue  around the surgical site.   EXPAREL is specifically designed to release pain medication over time and can control pain for up to 72 hours.   In addition to EXPAREL, your surgeon may provide other pain medications to control your pain.   Each patient is different and responds differently to painmedication. Depending on how you respond to EXPAREL, you may require less  additional pain medication during your recovery.    Possible Side Effects    Side effects can occur with any medication and it is important not to ignore anything you may be experiencing. Some patients who  received EXPAREL experienced nausea, vomiting, or constipation. Rarely, patients who receive bupivacaine (the active ingredient in  EXPAREL) have experienced numbness and tingling in their mouth or lips, lightheadedness, or anxiety. Speak with your doctor right  away if you think you may be experiencing any of these sensations, or if you have other questions regarding possible side effects.    Your Recovery    When your pain is under control, your body can better focus on healing. This is not the time to test your pain tolerance, or grin and  bear it.Work with your surgeon and nurse to make your recovery as speedy and pain-free as possible.   Follow the post-op orders your nurse gave you.   Eat a healthy diet and drink plenty of water. Surgery stresses your body; your body responds by needing more energy to heal.      Important Information About EXPAREL  Products that contain bupivacaine, like EXPAREL, may cause a temporary loss of  sensation or the ability to move in the area where bupivacaine  was injected.    Date Administered: 4/12/24  Time Administered: 0758    Other Forms of Bupivicaine should not be administered within 96hrs following administration of EXPAREL.

## 2024-04-12 NOTE — TRANSFER OF CARE
"Anesthesia Transfer of Care Note    Patient: Viktor Townsend Sr.    Procedure(s) Performed: Procedure(s) (LRB):  REPAIR, HERNIA, INGUINAL, WITHOUT HISTORY OF PRIOR REPAIR, AGE 5 YEARS OR OLDER; With MESH (Right)    Patient location: PACU    Anesthesia Type: regional and MAC    Transport from OR: Transported from OR on 2-3 L/min O2 by NC with adequate spontaneous ventilation    Post pain: adequate analgesia    Post assessment: no apparent anesthetic complications and tolerated procedure well    Post vital signs: stable    Level of consciousness: responds to stimulation and sedated    Nausea/Vomiting: no nausea/vomiting    Complications: none    Transfer of care protocol was followed      Last vitals: Visit Vitals  BP (!) 91/46 (BP Location: Right arm, Patient Position: Lying)   Pulse 61   Temp 36.5 °C (97.7 °F) (Skin)   Resp 18   Ht 5' 7" (1.702 m)   Wt 75.8 kg (167 lb)   SpO2 99%   BMI 26.16 kg/m²     "

## 2024-04-12 NOTE — OP NOTE
[facility] ([facility]  Operative Note    SUMMARY     Surgery Date: 4/12/2024     Surgeon(s) and Role:     * Avery Melton Jr., MD - Primary    Assisting Surgeon: None    Pre-op Diagnosis:  Right inguinal hernia [K40.90]    Post-op Diagnosis:  Post-Op Diagnosis Codes:     * Right inguinal hernia [K40.90]    Procedure(s) (LRB):  REPAIR, HERNIA, INGUINAL, WITHOUT HISTORY OF PRIOR REPAIR, AGE 5 YEARS OR OLDER; With MESH (Right)    Anesthesia: Regional    Description of Procedure:  The patient was brought to the operating room and placed in a supine position.  The abdomen and groin prepped and draped in a sterile fashion.  A transverse incision made over the right inguinal canal.  Using blunt and sharp dissection the incision was carried down through the subcutaneous tissues and the fascia of the external oblique opened along the bias of the fibers.  The spermatic cord identified and isolated.  There was a large chronic sac present along the cord using sharp dissection the cord was freed from surrounding structures and the sac returned into the peritoneal cavity.  Floor of the inguinal canal inspected.  There was no evidence of femoral hernia.  The floor of the inguinal canal then reconstructed with soft Prolene mesh sutured along the ileopubic tract laterally and the conjoined tendon medially.  The mesh was key holed around the spermatic cord and secured with interrupted 0 Ethibond sutures.  After reconstruction of the floor of the inguinal canal the spermatic cord was returned to its normal anatomic position.  The external oblique and subcutaneous tissues closed with running 3-0 Vicryl.  Skin closed with running 4-0 Monocryl and the wounds sterilely dressed.  The patient tolerated the procedure well left the operating room good condition.  At the end the procedure all sponge lap and instrument counts were correct.    Estimated Blood Loss:  Minimal         Specimens:   Specimen (24h ago, onward)      None                 SUMMARY     Admit Date:     Discharge Date and Time:  04/12/2024 8:37 AM    Hospital Course (synopsis of major diagnoses, care, treatment, and services provided during the course of the hospital stay):  Benign     Final Diagnosis: Post-Op Diagnosis Codes:     * Right inguinal hernia [K40.90]    Disposition: Home or Self Care    Follow Up/Patient Instructions:     Medications:  Reconciled Home Medications:      Medication List        START taking these medications      HYDROcodone-acetaminophen 5-325 mg per tablet  Commonly known as: NORCO  Take 1 tablet by mouth every 6 (six) hours as needed for Pain.            CONTINUE taking these medications      acetaminophen 325 MG tablet  Commonly known as: TYLENOL  Take 325 mg by mouth every 6 (six) hours as needed for Pain.     allopurinoL 100 MG tablet  Commonly known as: ZYLOPRIM  TAKE 1 TABLET(300 MG) BY MOUTH EVERY DAY     calcium carbonate 200 mg calcium (500 mg) chewable tablet  Commonly known as: TUMS  Take 1 tablet by mouth 3 (three) times daily as needed for Heartburn.     diphenhydrAMINE-acetaminophen  mg Tab  Commonly known as: TYLENOL PM  Take 1 tablet by mouth nightly as needed.     docusate sodium 100 MG capsule  Commonly known as: COLACE  Take 1 capsule (100 mg total) by mouth 2 (two) times daily.     GINKGO ORAL  Take by mouth.     hydrocortisone 2.5 % cream  Apply topically 2 (two) times daily.     lisinopriL 20 MG tablet  Commonly known as: PRINIVIL,ZESTRIL  Take 1 tablet (20 mg total) by mouth once daily.     pravastatin 20 MG tablet  Commonly known as: PRAVACHOL  Take 1 tablet (20 mg total) by mouth once daily.     VITAMIN B-12 ORAL  Take by mouth.     VITAMIN D2 50,000 unit Cap  Generic drug: ergocalciferol  Take 50,000 Units by mouth every 7 days.            Discharge Procedure Orders   Diet general     Call MD for:  temperature >100.4     Call MD for:  persistent nausea and vomiting     Call MD for:  severe uncontrolled pain     Call  MD for:  difficulty breathing, headache or visual disturbances     Call MD for:  redness, tenderness, or signs of infection (pain, swelling, redness, odor or green/yellow discharge around incision site)     Ice to affected area     Lifting restrictions   Order Comments: 15 lb  No heavy lifting, pulling, pushing     Wound care routine (specify)   Order Comments: Wound care routine:  Leave Prineo intact  May shower      Follow-up Information       Avery Melton Jr., MD Follow up in 10 day(s).    Specialties: General Surgery, Vascular Surgery  Contact information:  [address] [id number] [phone]

## 2024-04-12 NOTE — ANESTHESIA POSTPROCEDURE EVALUATION
Anesthesia Post Evaluation    Patient: Viktor Townsend     Procedure(s) Performed: Procedure(s) (LRB):  REPAIR, HERNIA, INGUINAL, WITHOUT HISTORY OF PRIOR REPAIR, AGE 5 YEARS OR OLDER; With MESH (Right)    Final Anesthesia Type: regional      Patient location during evaluation: PACU  Patient participation: Yes- Able to Participate  Level of consciousness: awake and alert  Post-procedure vital signs: reviewed and stable  Pain management: adequate  Airway patency: patent    PONV status at discharge: No PONV  Anesthetic complications: no      Cardiovascular status: blood pressure returned to baseline  Respiratory status: unassisted  Hydration status: euvolemic  Follow-up not needed.              Vitals Value Taken Time   /67 04/12/24 0932   Temp 36.3 °C (97.4 °F) 04/12/24 0932   Pulse 61 04/12/24 0932   Resp 16 04/12/24 0932   SpO2 98 % 04/12/24 0932         Event Time   Out of Recovery 09:27:51         Pain/Anu Score: Pain Rating Prior to Med Admin: 8 (4/12/2024  9:03 AM)  Pain Rating Post Med Admin: 6 (4/12/2024  9:22 AM)  Anu Score: 10 (4/12/2024  9:32 AM)

## 2024-04-12 NOTE — OR NURSING
Pt's V/S stable on room air while in PACU. Noted with wet cough, congestion in right lower lobe; Dr. Marshall notified and one time Xopenex neb tx given with good relief of symptoms. Afebrile. Incision to right groin clean, dry, intact. Pain tolerable at this time. Transferred to ACU, safety precautions in place.

## 2024-04-15 VITALS
RESPIRATION RATE: 16 BRPM | DIASTOLIC BLOOD PRESSURE: 70 MMHG | HEIGHT: 67 IN | SYSTOLIC BLOOD PRESSURE: 148 MMHG | TEMPERATURE: 97 F | HEART RATE: 60 BPM | BODY MASS INDEX: 26.21 KG/M2 | WEIGHT: 167 LBS | OXYGEN SATURATION: 97 %

## 2024-04-17 ENCOUNTER — HOSPITAL ENCOUNTER (OUTPATIENT)
Dept: CARDIOLOGY | Facility: OTHER | Age: 89
Discharge: HOME | End: 2024-04-17
Attending: INTERNAL MEDICINE
Payer: MEDICARE

## 2024-04-17 VITALS
DIASTOLIC BLOOD PRESSURE: 72 MMHG | SYSTOLIC BLOOD PRESSURE: 114 MMHG | HEIGHT: 67 IN | HEART RATE: 75 BPM | BODY MASS INDEX: 26.21 KG/M2 | WEIGHT: 167 LBS

## 2024-04-17 DIAGNOSIS — I48.92 ATRIAL FLUTTER, UNSPECIFIED TYPE: ICD-10-CM

## 2024-04-17 PROCEDURE — 93227 XTRNL ECG REC<48 HR R&I: CPT | Mod: ,,, | Performed by: INTERNAL MEDICINE

## 2024-04-17 PROCEDURE — 93306 TTE W/DOPPLER COMPLETE: CPT | Mod: 26,,, | Performed by: INTERNAL MEDICINE

## 2024-04-17 PROCEDURE — 93306 TTE W/DOPPLER COMPLETE: CPT

## 2024-04-17 PROCEDURE — 93225 XTRNL ECG REC<48 HRS REC: CPT

## 2024-04-18 LAB
AV INDEX (PROSTH): 0.67
AV MEAN GRADIENT: 5 MMHG
AV PEAK GRADIENT: 9 MMHG
AV REGURGITATION PRESSURE HALF TIME: 496.27 MS
AV VALVE AREA BY VELOCITY RATIO: 1.93 CM²
AV VALVE AREA: 2.22 CM²
AV VELOCITY RATIO: 0.59
BSA FOR ECHO PROCEDURE: 1.89 M2
CV ECHO LV RWT: 0.3 CM
DOP CALC AO PEAK VEL: 1.47 M/S
DOP CALC AO VTI: 30.4 CM
DOP CALC LVOT AREA: 3.3 CM2
DOP CALC LVOT DIAMETER: 2.05 CM
DOP CALC LVOT PEAK VEL: 0.86 M/S
DOP CALC LVOT STROKE VOLUME: 67.63 CM3
DOP CALCLVOT PEAK VEL VTI: 20.5 CM
E WAVE DECELERATION TIME: 154.44 MSEC
E/A RATIO: 2.62
E/E' RATIO: 11.13 M/S
ECHO LV POSTERIOR WALL: 0.72 CM (ref 0.6–1.1)
FRACTIONAL SHORTENING: 38 % (ref 28–44)
INTERVENTRICULAR SEPTUM: 0.7 CM (ref 0.6–1.1)
IVC DIAMETER: 1.72 CM
IVRT: 95.15 MSEC
LA MAJOR: 6.97 CM
LA MINOR: 6.29 CM
LA WIDTH: 4.1 CM
LEFT ATRIUM SIZE: 3.64 CM
LEFT ATRIUM VOLUME INDEX MOD: 51.4 ML/M2
LEFT ATRIUM VOLUME INDEX: 44.9 ML/M2
LEFT ATRIUM VOLUME MOD: 96.06 CM3
LEFT ATRIUM VOLUME: 83.88 CM3
LEFT INTERNAL DIMENSION IN SYSTOLE: 2.92 CM (ref 2.1–4)
LEFT VENTRICLE DIASTOLIC VOLUME INDEX: 55.75 ML/M2
LEFT VENTRICLE DIASTOLIC VOLUME: 104.26 ML
LEFT VENTRICLE MASS INDEX: 57 G/M2
LEFT VENTRICLE SYSTOLIC VOLUME INDEX: 17.5 ML/M2
LEFT VENTRICLE SYSTOLIC VOLUME: 32.73 ML
LEFT VENTRICULAR INTERNAL DIMENSION IN DIASTOLE: 4.74 CM (ref 3.5–6)
LEFT VENTRICULAR MASS: 106.47 G
LV LATERAL E/E' RATIO: 8.9 M/S
LV SEPTAL E/E' RATIO: 14.83 M/S
LVOT MG: 1.48 MMHG
LVOT MV: 0.57 CM/S
MV PEAK A VEL: 0.34 M/S
MV PEAK E VEL: 0.89 M/S
MV STENOSIS PRESSURE HALF TIME: 44.79 MS
MV VALVE AREA P 1/2 METHOD: 4.91 CM2
PISA AR MAX VEL: 4.43 M/S
PISA MRMAX VEL: 3.71 M/S
PISA TR MAX VEL: 2.42 M/S
PULM VEIN S/D RATIO: 0.44
PV PEAK D VEL: 0.71 M/S
PV PEAK GRADIENT: 2 MMHG
PV PEAK S VEL: 0.31 M/S
PV PEAK VELOCITY: 0.68 M/S
RA MAJOR: 6.24 CM
RA PRESSURE ESTIMATED: 3 MMHG
RA WIDTH: 4.2 CM
RV TB RVSP: 5 MMHG
STJ: 3.06 CM
TDI LATERAL: 0.1 M/S
TDI SEPTAL: 0.06 M/S
TDI: 0.08 M/S
TR MAX PG: 23 MMHG
TV REST PULMONARY ARTERY PRESSURE: 26 MMHG
Z-SCORE OF LEFT VENTRICULAR DIMENSION IN END DIASTOLE: -0.95
Z-SCORE OF LEFT VENTRICULAR DIMENSION IN END SYSTOLE: -0.76

## 2024-04-22 ENCOUNTER — OFFICE VISIT (OUTPATIENT)
Dept: SURGERY | Facility: CLINIC | Age: 89
End: 2024-04-22
Attending: SPECIALIST
Payer: MEDICARE

## 2024-04-22 VITALS — OXYGEN SATURATION: 100 % | HEART RATE: 68 BPM | DIASTOLIC BLOOD PRESSURE: 67 MMHG | SYSTOLIC BLOOD PRESSURE: 149 MMHG

## 2024-04-22 DIAGNOSIS — K40.90 RIGHT INGUINAL HERNIA: Primary | ICD-10-CM

## 2024-04-22 LAB
OHS CV EVENT MONITOR DAY: 0
OHS CV HOLTER LENGTH DECIMAL HOURS: 24
OHS CV HOLTER LENGTH HOURS: 24
OHS CV HOLTER LENGTH MINUTES: 0
OHS CV HOLTER SINUS AVERAGE HR: 69
OHS CV HOLTER SINUS MAX HR: 97
OHS CV HOLTER SINUS MIN HR: 66

## 2024-04-22 PROCEDURE — 1125F AMNT PAIN NOTED PAIN PRSNT: CPT | Mod: CPTII,S$GLB,, | Performed by: SPECIALIST

## 2024-04-22 PROCEDURE — 99999 PR PBB SHADOW E&M-EST. PATIENT-LVL III: CPT | Mod: PBBFAC,,, | Performed by: SPECIALIST

## 2024-04-22 PROCEDURE — 1160F RVW MEDS BY RX/DR IN RCRD: CPT | Mod: CPTII,S$GLB,, | Performed by: SPECIALIST

## 2024-04-22 PROCEDURE — 3288F FALL RISK ASSESSMENT DOCD: CPT | Mod: CPTII,S$GLB,, | Performed by: SPECIALIST

## 2024-04-22 PROCEDURE — 1159F MED LIST DOCD IN RCRD: CPT | Mod: CPTII,S$GLB,, | Performed by: SPECIALIST

## 2024-04-22 PROCEDURE — 99024 POSTOP FOLLOW-UP VISIT: CPT | Mod: S$GLB,,, | Performed by: SPECIALIST

## 2024-04-22 PROCEDURE — 1101F PT FALLS ASSESS-DOCD LE1/YR: CPT | Mod: CPTII,S$GLB,, | Performed by: SPECIALIST

## 2024-04-22 RX ORDER — HYDROCODONE BITARTRATE AND ACETAMINOPHEN 5; 325 MG/1; MG/1
1 TABLET ORAL EVERY 6 HOURS PRN
Qty: 20 TABLET | Refills: 0 | Status: SHIPPED | OUTPATIENT
Start: 2024-04-22

## 2024-04-22 NOTE — PROGRESS NOTES
Status post open primary repair right inguinal hernia 04/12/2024     Subjective   Some incisional discomfort     PE  Abdomen-soft, incisions clean, dry and intact.  No evidence of infection or recurrent      Impression/plan  Surgically stable   RTC 6-8 weeks

## 2024-06-13 ENCOUNTER — OFFICE VISIT (OUTPATIENT)
Dept: SPINE | Facility: CLINIC | Age: 89
End: 2024-06-13
Payer: MEDICARE

## 2024-06-13 ENCOUNTER — TELEPHONE (OUTPATIENT)
Dept: SPINE | Facility: CLINIC | Age: 89
End: 2024-06-13
Payer: MEDICARE

## 2024-06-13 VITALS
TEMPERATURE: 96 F | WEIGHT: 167.13 LBS | HEIGHT: 67 IN | OXYGEN SATURATION: 100 % | BODY MASS INDEX: 26.23 KG/M2 | RESPIRATION RATE: 18 BRPM | DIASTOLIC BLOOD PRESSURE: 60 MMHG | SYSTOLIC BLOOD PRESSURE: 125 MMHG | HEART RATE: 70 BPM

## 2024-06-13 DIAGNOSIS — M48.062 SPINAL STENOSIS OF LUMBAR REGION WITH NEUROGENIC CLAUDICATION: ICD-10-CM

## 2024-06-13 DIAGNOSIS — G95.9 CERVICAL MYELOPATHY: Primary | ICD-10-CM

## 2024-06-13 PROCEDURE — 99204 OFFICE O/P NEW MOD 45 MIN: CPT | Mod: S$GLB,,, | Performed by: ANESTHESIOLOGY

## 2024-06-13 PROCEDURE — 1159F MED LIST DOCD IN RCRD: CPT | Mod: CPTII,S$GLB,, | Performed by: ANESTHESIOLOGY

## 2024-06-13 PROCEDURE — 1101F PT FALLS ASSESS-DOCD LE1/YR: CPT | Mod: CPTII,S$GLB,, | Performed by: ANESTHESIOLOGY

## 2024-06-13 PROCEDURE — 1125F AMNT PAIN NOTED PAIN PRSNT: CPT | Mod: CPTII,S$GLB,, | Performed by: ANESTHESIOLOGY

## 2024-06-13 PROCEDURE — 3288F FALL RISK ASSESSMENT DOCD: CPT | Mod: CPTII,S$GLB,, | Performed by: ANESTHESIOLOGY

## 2024-06-13 PROCEDURE — 99999 PR PBB SHADOW E&M-EST. PATIENT-LVL IV: CPT | Mod: PBBFAC,,, | Performed by: ANESTHESIOLOGY

## 2024-06-13 NOTE — PROGRESS NOTES
PCP: Kalia Jones MD    REFERRING PHYSICIAN: Marsha Lewis MD    CHIEF COMPLAINT: Back Pain    Original HISTORY OF PRESENT ILLNESS: Viktor Townsend Sr. presents to the clinic for the evaluation of the above pain. The pain started 4 years ago after multiple falls. Pt continues to have intermittent falls. Pt mentions he fell twice, in may 2020 and December 2020. He says after the fall in December he was unable to get out of bed for two months. worse with walking and improved by restassociated numbness and tingling in left anterior thigh. There is subjective weakness and loss of coordination diffusely in upper and lower extremities. He mentions that he is not able to button his clothes very well anymore. He also needs to brace himself in order to maintain his balance when walking. Pt bends over to relief the pain in his back when needed.     Original Pain Description:  The pain is located in the mid/lower back and is axial in nature. The pain is described as sharp. Exacerbating factors: Walking. Mitigating factors rest. Symptoms interfere with sleeping. The patient feels like symptoms have been unchanged. Patient reports significant motor weakness. Pt is still active about 15 minutes per day in his garden. He has not done any physical therapy for the low back pain. He states that the pain goes away when he lays down.     Original PAIN SCORES:  Best: Pain is 0  Worst: Pain is 7  Current: Pain is 3        6/13/2024     1:51 PM   Last 3 PDI Scores   Pain Disability Index (PDI) 35       6 weeks of Conservative therapy:  PT: no   Chiro:  HEP:       Treatments / Medications: (Ice/Heat/NSAIDS/APAP/etc):  Tylenol      Interventional Pain Procedures: (Previous injections)      Past Medical History:   Diagnosis Date    Colon polyps     Hiatal hernia     Hypertension     Lumbar back pain     Personal history of asbestosis      Past Surgical History:   Procedure Laterality Date    CHOLECYSTECTOMY      EYE  SURGERY      PROSTATE SURGERY      REPAIR, HERNIA, INGUINAL, WITHOUT HISTORY OF PRIOR REPAIR, AGE 5 YEARS OR OLDER Right 4/12/2024    Procedure: REPAIR, HERNIA, INGUINAL, WITHOUT HISTORY OF PRIOR REPAIR, AGE 5 YEARS OR OLDER; With MESH;  Surgeon: Avery Melton Jr., MD;  Location: [facility];  Service: General;  Laterality: Right;  REGIONAL BLOCK MAC     Social History     Socioeconomic History    Marital status:    Occupational History    Occupation: Retired    Tobacco Use    Smoking status: Former     Passive exposure: Past    Smokeless tobacco: Never   Substance and Sexual Activity    Alcohol use: No     Alcohol/week: 0.0 standard drinks of alcohol    Drug use: No    Sexual activity: Yes     Partners: Female   Social History Narrative    Lives w/wife.  One adult son also lives at home.  Two adult daughters live out of state.       Family History   Problem Relation Name Age of Onset    Hyperlipidemia Father      Hypertension Father      Diabetes Father      No Known Problems Mother      Cancer Sister Fern     Dementia Sister Narayan     Diabetes Sister Birgit     No Known Problems Sister Flory        Review of patient's allergies indicates:  No Known Allergies    Current Outpatient Medications   Medication Sig    acetaminophen (TYLENOL) 325 MG tablet Take 325 mg by mouth every 6 (six) hours as needed for Pain.    allopurinoL (ZYLOPRIM) 100 MG tablet TAKE 1 TABLET(300 MG) BY MOUTH EVERY DAY    calcium carbonate (TUMS) 200 mg calcium (500 mg) chewable tablet Take 1 tablet by mouth 3 (three) times daily as needed for Heartburn.    cyanocobalamin, vitamin B-12, (VITAMIN B-12 ORAL) Take by mouth.    diphenhydrAMINE-acetaminophen (TYLENOL PM)  mg Tab Take 1 tablet by mouth nightly as needed.    docusate sodium (COLACE) 100 MG capsule Take 1 capsule (100 mg total) by mouth 2 (two) times daily.    ergocalciferol (VITAMIN D2) 50,000 unit Cap Take 50,000 Units by mouth every 7 days.    ginkgo biloba  "(GINKGO ORAL) Take by mouth.    HYDROcodone-acetaminophen (NORCO) 5-325 mg per tablet Take 1 tablet by mouth every 6 (six) hours as needed for Pain.    hydrocortisone 2.5 % cream Apply topically 2 (two) times daily.    lisinopriL (PRINIVIL,ZESTRIL) 20 MG tablet Take 1 tablet (20 mg total) by mouth once daily.    pravastatin (PRAVACHOL) 20 MG tablet Take 1 tablet (20 mg total) by mouth once daily.     No current facility-administered medications for this visit.       ROS:  GENERAL: No fever. No chills. No fatigue. Denies weight loss. Denies weight gain.  HEENT: Denies headaches. Denies vision change. Denies eye pain. Denies double vision. Denies ear pain.   CV: Denies chest pain.   PULM: Denies of shortness of breath.  GI: Denies constipation. No diarrhea. No abdominal pain. Denies nausea. Denies vomiting. No blood in stool.  HEME: Denies bleeding problems.  : Denies urgency. No painful urination. No blood in urine.  MS: Denies joint stiffness. Denies joint swelling.  Back pain.  SKIN: Denies rash.   NEURO: Denies seizures. No weakness.  PSYCH:  Denies difficulty sleeping. No anxiety. Denies depression. No suicidal thoughts.       VITALS:   Vitals:    06/13/24 1349 06/13/24 1351   BP:  125/60   Pulse:  70   Resp: 18 18   Temp:  96.1 °F (35.6 °C)   SpO2: 100% 100%   Weight: 75.8 kg (167 lb 1.7 oz) 75.8 kg (167 lb 1.7 oz)   Height: 5' 7" (1.702 m) 5' 7" (1.702 m)   PainSc:   7   7   PainLoc: Back Back         PHYSICAL EXAM:   GENERAL: Well appearing, in no acute distress, alert and oriented x3.  PSYCH:  Mood and affect appropriate.  SKIN: Skin color, texture, turgor normal, no rashes or lesions.  HEENT:  Normocephalic, atraumatic. Cranial nerves grossly intact.  NECK: No pain to palpation over the cervical paraspinous muscles. No pain to palpation over facets. No pain with neck flexion, extension, or lateral flexion.   PULM: No evidence of respiratory difficulty, symmetric chest rise.  GI:  Non-distended  BACK: " Decreased range of motion in flexion and extension. No pain to palpation over the spinous processes. No pain to palpation over facet joints. There is no pain with palpation over the sacroiliac joints bilaterally.   EXTREMITIES: No deformities, edema, or skin discoloration.   MUSCULOSKELETAL: Shoulder, hip, and knee provocative maneuvers are negative. Atrophy in the hands, thighs and calves bilaterally.   NEURO: Sensation is equal and appropriate bilaterally. Bilateral upper and lower extremity strength is normal and symmetric. Bilateral upper and lower extremity muscle stretch reflexes are physiologic and symmetric. Plantar response are downgoing. Straight leg raising in the supine position is negative to radicular pain.   GAIT: normal.      IMAGING:    MRI SPINE CERVICAL-THORACIC-LUMBAR WITHOUT CONTRAST (XPD)     CLINICAL HISTORY:  r/o myelopathy, possible L5 compression fx; Unspecified abnormalities of gait and mobility     TECHNIQUE:  Multiplanar, multisequence MR images of the cervical, thoracic, and lumbar spine were performed without contrast.     COMPARISON:  Lumbar radiograph 03/26/2021     FINDINGS:  Cervical spine:     Degenerative changes of the atlantodental interval.     Grade 1 anterolisthesis C7 on T1.     Vertebral body heights are within normal limits.  No marrow placement process.  No fracture.     Multilevel disc space height loss and disc desiccation.     Cord flattening and increased signal at C4-C5 and C5-C6 consistent with myelomalacia.     Degenerative findings:     C2-C3: Posterior disc osteophyte complex, uncovertebral spurring, and facet arthropathy.  No significant spinal canal stenosis or neural foraminal narrowing.     C3-C4: Posterior disc osteophyte complex, uncovertebral spurring, and facet arthropathy.  Moderate spinal canal stenosis.  Severe bilateral neural foraminal narrowing.     C4-C5: Posterior disc osteophyte complex, uncovertebral spurring, and facet arthropathy.  Severe  spinal canal stenosis.  Severe bilateral neural foraminal narrowing.     C5-C6: Posterior disc osteophyte complex, uncovertebral spurring, and facet arthropathy.  Severe spinal canal stenosis.  Moderate bilateral neural foraminal narrowing.     C6-C7: Posterior disc osteophyte complex, uncovertebral spurring, and facet arthropathy.  Moderate spinal canal stenosis and moderate bilateral neural foraminal narrowing.     C7-T1: Grade 1 anterolisthesis.  Posterior disc osteophyte complex and facet arthropathy.  Moderate spinal canal stenosis and moderate bilateral neural foraminal narrowing.     Thoracic spine:     Satisfactory alignment.     Vertebral body heights are within normal limits.  No marrow replacement process.  No fracture.     Multilevel intervertebral disc space height loss.     The thoracic cord is within normal limits.     T11-T12 circumferential disc bulge, facet arthropathy, and ligamentum flavum hypertrophy resulting in mild spinal canal stenosis.  T3-T4 ligamentum flavum hypertrophy and T8-T9 posterior disc bulge without significant spinal canal stenosis.  No significant neural foraminal narrowing.     Lumbar spine:     Grade 1 anterolisthesis L5 on S1.     No compression fractures identified.  No marrow placement process.     Severe right-sided degenerative disc disease, noting severe disc height loss with prominent sub endplate marrow edema.  There is advanced right-sided facet arthropathy with joint hypertrophy prominent synovial fluid and surrounding inflammatory changes.     Conus ends at L1.     L1-L2: Facet arthropathy.  No significant spinal canal stenosis or neural foraminal narrowing.     L2-L3: Small circumferential disc bulge and facet arthropathy.  No significant spinal canal stenosis or neural foraminal narrowing.     L3-L4: Circumferential disc bulge, facet arthropathy, and ligamentum flavum hypertrophy.  Mild spinal canal stenosis.  Mild bilateral neural foraminal narrowing.     L4-L5:  Circumferential disc bulge, facet arthropathy, and ligamentum flavum hypertrophy.  Severe spinal canal stenosis.  Moderate left and mild right neural foraminal narrowing.     L5-S1: Grade 1 anterolisthesis with uncovered disc, facet arthropathy, ligamentum flavum hypertrophy.  Severe spinal canal stenosis.  Severe right and mild left neural foraminal narrowing.     Subcutaneous soft tissue edema of the lower back.     Impression:     Cervical spondylosis, resulting in moderate/ severe spinal canal and neural foraminal stenosis from C4-5 through C7-T1, as above.  Increased cord signal noted at the C4-5 and C5-6 levels, concerning for edema and/or myelomalacia.     Lumbar spondylosis, resulting in severe spinal canal stenosis and moderate/ severe neural foraminal narrowing at L4-5 and L5-S1, as above.  Partial lumbarization of S1 noted, normal variant.     Advanced right-sided degenerative disc disease and facet arthropathy at L5-S1.  No fracture identified.     Mild spinal canal stenosis at T11-12.    ASSESSMENT: 89 y.o. year old male with pain, consistent with:    Encounter Diagnoses   Name Primary?    Cervical myelopathy Yes    Spinal stenosis of lumbar region with neurogenic claudication        DISCUSSION: Viktor Patelsarah Solitario. is a nonagenarian who comes to us with low back pain, cervical neck pain, weakness in the upper and lower extremities with prior falls and claudication in the calves.Imaging shows severe spinal canal stenosis with cord signal changes in 2023. He also has severe lumbar canal stenosis also in 2023.     PLAN:  Reviewed MRIs with patient and family  Family discussion about options  They collectively decline surgical intervention  Therefore, we will not pursue additional imaging  Will refer to Home PT   Recommend walker as needed  Reviewed red-flag findings that should prompt re-evaluation  They would like to follow up as needed      I would like to thank Marsha Lewis MD for the opportunity  to assist in the care of this patient. We had a very nice visit and I look forward to continuing their care. Please let me know if I can be of further assistance.     Jam Karimi, PGY2  Department of Anesthesiology  [facility]

## 2024-06-13 NOTE — TELEPHONE ENCOUNTER
Staff tried leaving pt a message to inform him to contact office to be scheduled for a 3 month f/u with .

## 2024-06-24 ENCOUNTER — OFFICE VISIT (OUTPATIENT)
Dept: SURGERY | Facility: CLINIC | Age: 89
End: 2024-06-24
Attending: SPECIALIST
Payer: MEDICARE

## 2024-06-24 VITALS — SYSTOLIC BLOOD PRESSURE: 119 MMHG | OXYGEN SATURATION: 99 % | DIASTOLIC BLOOD PRESSURE: 56 MMHG | HEART RATE: 71 BPM

## 2024-06-24 DIAGNOSIS — K40.90 RIGHT INGUINAL HERNIA: Primary | ICD-10-CM

## 2024-06-24 PROCEDURE — 1126F AMNT PAIN NOTED NONE PRSNT: CPT | Mod: CPTII,S$GLB,, | Performed by: SPECIALIST

## 2024-06-24 PROCEDURE — 3288F FALL RISK ASSESSMENT DOCD: CPT | Mod: CPTII,S$GLB,, | Performed by: SPECIALIST

## 2024-06-24 PROCEDURE — 99024 POSTOP FOLLOW-UP VISIT: CPT | Mod: S$GLB,,, | Performed by: SPECIALIST

## 2024-06-24 PROCEDURE — 1159F MED LIST DOCD IN RCRD: CPT | Mod: CPTII,S$GLB,, | Performed by: SPECIALIST

## 2024-06-24 PROCEDURE — 1160F RVW MEDS BY RX/DR IN RCRD: CPT | Mod: CPTII,S$GLB,, | Performed by: SPECIALIST

## 2024-06-24 PROCEDURE — 1101F PT FALLS ASSESS-DOCD LE1/YR: CPT | Mod: CPTII,S$GLB,, | Performed by: SPECIALIST

## 2024-06-24 PROCEDURE — 99999 PR PBB SHADOW E&M-EST. PATIENT-LVL III: CPT | Mod: PBBFAC,,, | Performed by: SPECIALIST

## 2024-06-24 NOTE — PROGRESS NOTES
89-year-old male status post open mesh repair right inguinal hernia 04/12/2024     Subjective   No complaints referable to hernia repair      PE   Abdomen-soft, wounds healed no evidence of recurrence     Impression/plan   Surgically stable   RTC p.r.n.

## 2024-07-31 ENCOUNTER — EXTERNAL HOME HEALTH (OUTPATIENT)
Dept: HOME HEALTH SERVICES | Facility: HOSPITAL | Age: 89
End: 2024-07-31
Payer: MEDICARE

## 2024-08-20 ENCOUNTER — TELEPHONE (OUTPATIENT)
Dept: INTERNAL MEDICINE | Facility: CLINIC | Age: 89
End: 2024-08-20
Payer: MEDICARE

## 2024-08-20 DIAGNOSIS — M19.90 ARTHRITIS: Primary | ICD-10-CM

## 2024-08-20 NOTE — TELEPHONE ENCOUNTER
----- Message from Eileen Valdez sent at 8/19/2024  2:49 PM CDT -----  Regarding: P/t advice  Type: Patient Call Back    Who called: alexys Estrella     What is the request in detail: p/t wife is calling to speak to provider about acupuncture. Please call to assist.     Can the clinic reply by [facility]?    Would the patient rather a call back or a response via [facility]?     Best call back number: [phone]    Additional Information:

## 2024-08-21 NOTE — TELEPHONE ENCOUNTER
Spoke with pt wife Jayla who stated the pt is suffering with arthritis in his back and left hip. Pt is requesting to try acupuncture? Pended referral. Informed pt wife they will be reaching out to get him scheduled. Pt wife Jayla verbalized understanding.

## 2024-08-27 DIAGNOSIS — E78.00 PURE HYPERCHOLESTEROLEMIA: ICD-10-CM

## 2024-08-27 NOTE — TELEPHONE ENCOUNTER
----- Message from Sheila Starr sent at 8/27/2024  4:47 PM CDT -----  Regarding: Refill  Type: RX Refill Request    Who Called:Jayla Townsend(Spouse)    RX Name and Strength:pravastatin (PRAVACHOL) 20 MG tablet    Preferred Pharmacy with phone number:[id number] [address]      Would the patient rather a call back or a response via [facility]?call back     Best Call Back Number:[phone]    Additional Information:

## 2024-08-27 NOTE — TELEPHONE ENCOUNTER
Care Due:                  Date            Visit Type   Department     Provider  --------------------------------------------------------------------------------                                MYCHART                              ANNUAL                              CHECKUP/PHY  [facility] INTERNAL  Fredahsanduane Alonso  Last Visit: 11-      [facility]  Next Visit: None Scheduled  None         None Found                                                            Last  Test          Frequency    Reason                     Performed    Due Date  --------------------------------------------------------------------------------    CMP.........  12 months..  allopurinoL, pravastatin.  04- 04-    Lipid Panel.  12 months..  pravastatin..............  12- 12-    Uric Acid...  12 months..  allopurinoL..............  Not Found    Overdue    [employer] Embedded Care Due Messages. Reference number: [id number].   8/27/2024 4:58:00 PM CDT

## 2024-08-27 NOTE — TELEPHONE ENCOUNTER
Last office visit: 11/28/2024 (6 month follow up)  No upcoming appointment on file.    Labs last complete: 4/9/2024    Allergies confirmed, medication pended, and routed to PRIMARY CARE PROVIDER.

## 2024-08-28 RX ORDER — PRAVASTATIN SODIUM 20 MG/1
20 TABLET ORAL DAILY
Qty: 90 TABLET | Refills: 0 | Status: SHIPPED | OUTPATIENT
Start: 2024-08-28

## 2024-08-29 ENCOUNTER — PATIENT MESSAGE (OUTPATIENT)
Dept: SPINE | Facility: CLINIC | Age: 89
End: 2024-08-29
Payer: MEDICARE

## 2024-09-13 ENCOUNTER — NURSE TRIAGE (OUTPATIENT)
Dept: ADMINISTRATIVE | Facility: CLINIC | Age: 89
End: 2024-09-13
Payer: MEDICARE

## 2024-09-13 NOTE — TELEPHONE ENCOUNTER
"Left a voicemail, asking if spouse has taken patient to Emergency Department for treatment. Will call back at EOD to check status of patient.     "  Wife Jayla states that pt left arm is numb from elbow to hand. States that arm is also cold and pt unable to feel. Placed pt on phone for triage. Advised to call 911 now. VU. States that son is there and will call 911. Encounter routed to provider.      Reason for Disposition   New neurologic deficit that is present NOW, sudden onset of ANY of the following: * Weakness of the face, arm, or leg on one side of the body* Numbness of the face, arm, or leg on one side of the body* Loss of speech or garbled speech    Additional Information   Negative: Difficult to awaken or acting confused (e.g., disoriented, slurred speech)    Protocols used: Neurologic Deficit-A-OH     "  "

## 2024-09-13 NOTE — TELEPHONE ENCOUNTER
Wife Jayla states that pt left arm is numb from elbow to hand. States that arm is also cold and pt unable to feel. Placed pt on phone for triage. Advised to call 911 now. VU. States that son is there and will call 911. Encounter routed to provider.     Reason for Disposition   New neurologic deficit that is present NOW, sudden onset of ANY of the following: * Weakness of the face, arm, or leg on one side of the body* Numbness of the face, arm, or leg on one side of the body* Loss of speech or garbled speech    Additional Information   Negative: Difficult to awaken or acting confused (e.g., disoriented, slurred speech)    Protocols used: Neurologic Deficit-A-OH

## 2024-11-13 ENCOUNTER — OFFICE VISIT (OUTPATIENT)
Dept: PAIN MEDICINE | Facility: CLINIC | Age: 89
End: 2024-11-13
Payer: MEDICARE

## 2024-11-13 VITALS
DIASTOLIC BLOOD PRESSURE: 80 MMHG | BODY MASS INDEX: 26.17 KG/M2 | OXYGEN SATURATION: 98 % | SYSTOLIC BLOOD PRESSURE: 120 MMHG | TEMPERATURE: 99 F | RESPIRATION RATE: 18 BRPM | HEART RATE: 71 BPM | WEIGHT: 167.13 LBS

## 2024-11-13 DIAGNOSIS — M54.50 DORSALGIA OF LUMBOSACRAL REGION: Primary | ICD-10-CM

## 2024-11-13 DIAGNOSIS — M48.02 SPINAL STENOSIS OF CERVICAL REGION: ICD-10-CM

## 2024-11-13 DIAGNOSIS — M47.816 FACET ARTHROPATHY, LUMBAR: ICD-10-CM

## 2024-11-13 DIAGNOSIS — M48.062 SPINAL STENOSIS OF LUMBAR REGION WITH NEUROGENIC CLAUDICATION: ICD-10-CM

## 2024-11-13 PROCEDURE — 3288F FALL RISK ASSESSMENT DOCD: CPT | Mod: CPTII,S$GLB,, | Performed by: ANESTHESIOLOGY

## 2024-11-13 PROCEDURE — 99214 OFFICE O/P EST MOD 30 MIN: CPT | Mod: 25,S$GLB,, | Performed by: ANESTHESIOLOGY

## 2024-11-13 PROCEDURE — 99999 PR PBB SHADOW E&M-EST. PATIENT-LVL III: CPT | Mod: PBBFAC,,, | Performed by: ANESTHESIOLOGY

## 2024-11-13 PROCEDURE — 96372 THER/PROPH/DIAG INJ SC/IM: CPT | Mod: S$GLB,,,

## 2024-11-13 PROCEDURE — 1159F MED LIST DOCD IN RCRD: CPT | Mod: CPTII,S$GLB,, | Performed by: ANESTHESIOLOGY

## 2024-11-13 PROCEDURE — 1125F AMNT PAIN NOTED PAIN PRSNT: CPT | Mod: CPTII,S$GLB,, | Performed by: ANESTHESIOLOGY

## 2024-11-13 PROCEDURE — 1101F PT FALLS ASSESS-DOCD LE1/YR: CPT | Mod: CPTII,S$GLB,, | Performed by: ANESTHESIOLOGY

## 2024-11-13 RX ORDER — TRIAMCINOLONE ACETONIDE 40 MG/ML
40 INJECTION, SUSPENSION INTRA-ARTICULAR; INTRAMUSCULAR ONCE
Status: COMPLETED | OUTPATIENT
Start: 2024-11-13 | End: 2024-11-13

## 2024-11-13 RX ADMIN — TRIAMCINOLONE ACETONIDE 40 MG: 40 INJECTION, SUSPENSION INTRA-ARTICULAR; INTRAMUSCULAR at 02:11

## 2024-11-13 NOTE — PROGRESS NOTES
PCP: Kalia Jones MD    REFERRING PHYSICIAN: No ref. provider found    CHIEF COMPLAINT: Back Pain    Original HISTORY OF PRESENT ILLNESS: Viktor Townsend Sr. presents to the clinic for the evaluation of the above pain. The pain started 4 years ago after multiple falls. Pt continues to have intermittent falls. Pt mentions he fell twice, in may 2020 and December 2020. He says after the fall in December he was unable to get out of bed for two months. worse with walking and improved by restassociated numbness and tingling in left anterior thigh. There is subjective weakness and loss of coordination diffusely in upper and lower extremities. He mentions that he is not able to button his clothes very well anymore. He also needs to brace himself in order to maintain his balance when walking. Pt bends over to relief the pain in his back when needed.     Original Pain Description:  The pain is located in the mid/lower back and is axial in nature. The pain is described as sharp. Exacerbating factors: Walking. Mitigating factors rest. Symptoms interfere with sleeping. The patient feels like symptoms have been unchanged. Patient reports significant motor weakness. Pt is still active about 15 minutes per day in his garden. He has not done any physical therapy for the low back pain. He states that the pain goes away when he lays down.     Original PAIN SCORES:  Best: Pain is 0  Worst: Pain is 7  Current: Pain is 3        11/13/2024     1:35 PM   Last 3 PDI Scores   Pain Disability Index (PDI) 56       6 weeks of Conservative therapy:  PT: Home Health 07/2024-08/2024 (6 weeks)  Chiro:  HEP: 3-4 times per day      Treatments / Medications: (Ice/Heat/NSAIDS/APAP/etc):  Tylenol      Interventional Pain Procedures: (Previous injections)  None    INTERVAL HISTORY (NEWEST AT THE BOTTOM)    Interval History 11/13/2024:  Viktor Townsend Sr. Reutrns to clinic for hip and back pain. Since his last office visit, he has completed  home health PT. He reports good benefit. He states he would like a cortisone injection to help with his back pain. His back pain is worse with standing longer than 5-10 minutes. He states after 5-10 minutes his bilateral legs feel weak and he needs to sit down. He has no pain while laying. His pain does not affect his sleeping. He continues tylenol with some benefit. He denies any perceived side effects. He is not interested in ASYA or surgical intervention at this time. He denies recent health changes. He denies recent falls or trauma. He denies new onset fever/night sweats, urinary incontinence, bowel incontinence, significant weight changes, significant motor weakness or changes, or loss of sensations. His pain today is 8/10.        Past Medical History:   Diagnosis Date    Colon polyps     Hiatal hernia     Hypertension     Lumbar back pain     Personal history of asbestosis      Past Surgical History:   Procedure Laterality Date    CHOLECYSTECTOMY      EYE SURGERY      PROSTATE SURGERY      REPAIR, HERNIA, INGUINAL, WITHOUT HISTORY OF PRIOR REPAIR, AGE 5 YEARS OR OLDER Right 4/12/2024    Procedure: REPAIR, HERNIA, INGUINAL, WITHOUT HISTORY OF PRIOR REPAIR, AGE 5 YEARS OR OLDER; With MESH;  Surgeon: Avery Melton Jr., MD;  Location: [facility];  Service: General;  Laterality: Right;  REGIONAL BLOCK MAC     Social History     Socioeconomic History    Marital status:    Occupational History    Occupation: Retired    Tobacco Use    Smoking status: Former     Passive exposure: Past    Smokeless tobacco: Never   Substance and Sexual Activity    Alcohol use: No     Alcohol/week: 0.0 standard drinks of alcohol    Drug use: No    Sexual activity: Yes     Partners: Female   Social History Narrative    Lives w/wife.  One adult son also lives at home.  Two adult daughters live out of state.       Family History   Problem Relation Name Age of Onset    Hyperlipidemia Father      Hypertension Father       Diabetes Father      No Known Problems Mother      Cancer Sister Fern     Dementia Sister Narayan     Diabetes Sister Birgit     No Known Problems Sister Flory        Review of patient's allergies indicates:  No Known Allergies    Current Outpatient Medications   Medication Sig    acetaminophen (TYLENOL) 325 MG tablet Take 325 mg by mouth every 6 (six) hours as needed for Pain.    allopurinoL (ZYLOPRIM) 100 MG tablet TAKE 1 TABLET(300 MG) BY MOUTH EVERY DAY    calcium carbonate (TUMS) 200 mg calcium (500 mg) chewable tablet Take 1 tablet by mouth 3 (three) times daily as needed for Heartburn.    cyanocobalamin, vitamin B-12, (VITAMIN B-12 ORAL) Take by mouth.    diphenhydrAMINE-acetaminophen (TYLENOL PM)  mg Tab Take 1 tablet by mouth nightly as needed.    docusate sodium (COLACE) 100 MG capsule Take 1 capsule (100 mg total) by mouth 2 (two) times daily.    ergocalciferol (VITAMIN D2) 50,000 unit Cap Take 50,000 Units by mouth every 7 days.    ginkgo biloba (GINKGO ORAL) Take by mouth.    HYDROcodone-acetaminophen (NORCO) 5-325 mg per tablet Take 1 tablet by mouth every 6 (six) hours as needed for Pain.    hydrocortisone 2.5 % cream Apply topically 2 (two) times daily.    lisinopriL (PRINIVIL,ZESTRIL) 20 MG tablet Take 1 tablet (20 mg total) by mouth once daily.    pravastatin (PRAVACHOL) 20 MG tablet Take 1 tablet (20 mg total) by mouth once daily.     No current facility-administered medications for this visit.       ROS:  GENERAL: No fever. No chills. No fatigue. Denies weight loss. Denies weight gain.  HEENT: Denies headaches. Denies vision change. Denies eye pain. Denies double vision. Denies ear pain.   CV: Denies chest pain.   PULM: Denies of shortness of breath.  GI: Denies constipation. No diarrhea. No abdominal pain. Denies nausea. Denies vomiting. No blood in stool.  HEME: Denies bleeding problems.  : Denies urgency. No painful urination. No blood in urine.  MS: Denies joint stiffness. Denies joint  swelling.  Back pain.  SKIN: Denies rash.   NEURO: Denies seizures. No weakness.  PSYCH:  Denies difficulty sleeping. No anxiety. Denies depression. No suicidal thoughts.       VITALS:   Vitals:    11/13/24 1334   BP: 120/80   Pulse: 71   Resp: 18   Temp: 98.8 °F (37.1 °C)   SpO2: 98%   Weight: 75.8 kg (167 lb 1.7 oz)   PainSc:   8           PHYSICAL EXAM:   GENERAL: Well appearing, in no acute distress, alert and oriented x3.  PSYCH:  Mood and affect appropriate.  SKIN: Skin color, texture, turgor normal, no rashes or lesions.  HEENT:  Normocephalic, atraumatic. Cranial nerves grossly intact.  NECK: No pain to palpation over the cervical paraspinous muscles. No pain to palpation over facets. No pain with neck flexion, extension, or lateral flexion.   PULM: No evidence of respiratory difficulty, symmetric chest rise.  GI:  Non-distended  BACK: Decreased range of motion in flexion and extension without pain. No pain with facet loading bilaterally. Tenderness to palpation over lumbar spine.  No pain to palpation over facet joints bilaterally. There is no pain with palpation over the sacroiliac joints bilaterally. No pain over lumbar musculature.   EXTREMITIES: No deformities, edema, or skin discoloration.   MUSCULOSKELETAL: Shoulder, hip, and knee provocative maneuvers are negative. Atrophy in the hands, thighs and calves bilaterally.   NEURO: Sensation is equal and appropriate bilaterally. Bilateral upper and lower extremity strength is normal and symmetric. Bilateral upper and lower extremity muscle stretch reflexes are physiologic and symmetric. Plantar response are downgoing. Straight leg raising in the supine position is negative to radicular pain.   GAIT: walker      IMAGING:    MRI SPINE CERVICAL-THORACIC-LUMBAR WITHOUT CONTRAST (XPD)     CLINICAL HISTORY:  r/o myelopathy, possible L5 compression fx; Unspecified abnormalities of gait and mobility     TECHNIQUE:  Multiplanar, multisequence MR images of the  cervical, thoracic, and lumbar spine were performed without contrast.     COMPARISON:  Lumbar radiograph 03/26/2021     FINDINGS:  Cervical spine:     Degenerative changes of the atlantodental interval.     Grade 1 anterolisthesis C7 on T1.     Vertebral body heights are within normal limits.  No marrow placement process.  No fracture.     Multilevel disc space height loss and disc desiccation.     Cord flattening and increased signal at C4-C5 and C5-C6 consistent with myelomalacia.     Degenerative findings:     C2-C3: Posterior disc osteophyte complex, uncovertebral spurring, and facet arthropathy.  No significant spinal canal stenosis or neural foraminal narrowing.     C3-C4: Posterior disc osteophyte complex, uncovertebral spurring, and facet arthropathy.  Moderate spinal canal stenosis.  Severe bilateral neural foraminal narrowing.     C4-C5: Posterior disc osteophyte complex, uncovertebral spurring, and facet arthropathy.  Severe spinal canal stenosis.  Severe bilateral neural foraminal narrowing.     C5-C6: Posterior disc osteophyte complex, uncovertebral spurring, and facet arthropathy.  Severe spinal canal stenosis.  Moderate bilateral neural foraminal narrowing.     C6-C7: Posterior disc osteophyte complex, uncovertebral spurring, and facet arthropathy.  Moderate spinal canal stenosis and moderate bilateral neural foraminal narrowing.     C7-T1: Grade 1 anterolisthesis.  Posterior disc osteophyte complex and facet arthropathy.  Moderate spinal canal stenosis and moderate bilateral neural foraminal narrowing.     Thoracic spine:     Satisfactory alignment.     Vertebral body heights are within normal limits.  No marrow replacement process.  No fracture.     Multilevel intervertebral disc space height loss.     The thoracic cord is within normal limits.     T11-T12 circumferential disc bulge, facet arthropathy, and ligamentum flavum hypertrophy resulting in mild spinal canal stenosis.  T3-T4 ligamentum  flavum hypertrophy and T8-T9 posterior disc bulge without significant spinal canal stenosis.  No significant neural foraminal narrowing.     Lumbar spine:     Grade 1 anterolisthesis L5 on S1.     No compression fractures identified.  No marrow placement process.     Severe right-sided degenerative disc disease, noting severe disc height loss with prominent sub endplate marrow edema.  There is advanced right-sided facet arthropathy with joint hypertrophy prominent synovial fluid and surrounding inflammatory changes.     Conus ends at L1.     L1-L2: Facet arthropathy.  No significant spinal canal stenosis or neural foraminal narrowing.     L2-L3: Small circumferential disc bulge and facet arthropathy.  No significant spinal canal stenosis or neural foraminal narrowing.     L3-L4: Circumferential disc bulge, facet arthropathy, and ligamentum flavum hypertrophy.  Mild spinal canal stenosis.  Mild bilateral neural foraminal narrowing.     L4-L5: Circumferential disc bulge, facet arthropathy, and ligamentum flavum hypertrophy.  Severe spinal canal stenosis.  Moderate left and mild right neural foraminal narrowing.     L5-S1: Grade 1 anterolisthesis with uncovered disc, facet arthropathy, ligamentum flavum hypertrophy.  Severe spinal canal stenosis.  Severe right and mild left neural foraminal narrowing.     Subcutaneous soft tissue edema of the lower back.     Impression:     Cervical spondylosis, resulting in moderate/ severe spinal canal and neural foraminal stenosis from C4-5 through C7-T1, as above.  Increased cord signal noted at the C4-5 and C5-6 levels, concerning for edema and/or myelomalacia.     Lumbar spondylosis, resulting in severe spinal canal stenosis and moderate/ severe neural foraminal narrowing at L4-5 and L5-S1, as above.  Partial lumbarization of S1 noted, normal variant.     Advanced right-sided degenerative disc disease and facet arthropathy at L5-S1.  No fracture identified.     Mild spinal canal  stenosis at T11-12.    ASSESSMENT: 89 y.o. year old male with pain, consistent with:    Encounter Diagnoses   Name Primary?    Dorsalgia of lumbosacral region Yes    Spinal stenosis of cervical region     Spinal stenosis of lumbar region with neurogenic claudication     Facet arthropathy, lumbar          DISCUSSION: Viktor Patelsarah Collier is a nonagenarian who comes to us with low back pain, cervical neck pain, weakness in the upper and lower extremities with prior falls and claudication in the calves. Imaging shows severe cervical canal stenosis with cord signal changes in 2023. He also has severe lumbar canal stenosis also in 2023. They decline surgical treatments.     PLAN:  Discussion with family  Discussed treatment options: Depot steroid vs TFESI  Performed Depot injection at patient request.   Follow up as needed  Recommend TFESI if needed    The above plan and management options were discussed at length with patient. Patient is in agreement with the above and verbalized understanding.     Zunlida Lozano NP  11/13/2024

## 2024-12-02 ENCOUNTER — OFFICE VISIT (OUTPATIENT)
Dept: INTERNAL MEDICINE | Facility: CLINIC | Age: 89
End: 2024-12-02
Attending: FAMILY MEDICINE
Payer: MEDICARE

## 2024-12-02 VITALS
WEIGHT: 128.63 LBS | DIASTOLIC BLOOD PRESSURE: 74 MMHG | OXYGEN SATURATION: 96 % | BODY MASS INDEX: 20.19 KG/M2 | SYSTOLIC BLOOD PRESSURE: 130 MMHG | HEART RATE: 98 BPM | HEIGHT: 67 IN

## 2024-12-02 DIAGNOSIS — I10 ESSENTIAL HYPERTENSION: Primary | ICD-10-CM

## 2024-12-02 DIAGNOSIS — R79.9 ABNORMAL FINDING OF BLOOD CHEMISTRY, UNSPECIFIED: ICD-10-CM

## 2024-12-02 DIAGNOSIS — E78.00 PURE HYPERCHOLESTEROLEMIA: ICD-10-CM

## 2024-12-02 DIAGNOSIS — M10.9 GOUT, UNSPECIFIED CAUSE, UNSPECIFIED CHRONICITY, UNSPECIFIED SITE: ICD-10-CM

## 2024-12-02 PROCEDURE — 1159F MED LIST DOCD IN RCRD: CPT | Mod: CPTII,S$GLB,, | Performed by: FAMILY MEDICINE

## 2024-12-02 PROCEDURE — 1101F PT FALLS ASSESS-DOCD LE1/YR: CPT | Mod: CPTII,S$GLB,, | Performed by: FAMILY MEDICINE

## 2024-12-02 PROCEDURE — G2211 COMPLEX E/M VISIT ADD ON: HCPCS | Mod: S$GLB,,, | Performed by: FAMILY MEDICINE

## 2024-12-02 PROCEDURE — 1160F RVW MEDS BY RX/DR IN RCRD: CPT | Mod: CPTII,S$GLB,, | Performed by: FAMILY MEDICINE

## 2024-12-02 PROCEDURE — 1126F AMNT PAIN NOTED NONE PRSNT: CPT | Mod: CPTII,S$GLB,, | Performed by: FAMILY MEDICINE

## 2024-12-02 PROCEDURE — 99214 OFFICE O/P EST MOD 30 MIN: CPT | Mod: S$GLB,,, | Performed by: FAMILY MEDICINE

## 2024-12-02 PROCEDURE — 99999 PR PBB SHADOW E&M-EST. PATIENT-LVL III: CPT | Mod: PBBFAC,,, | Performed by: FAMILY MEDICINE

## 2024-12-02 PROCEDURE — 3288F FALL RISK ASSESSMENT DOCD: CPT | Mod: CPTII,S$GLB,, | Performed by: FAMILY MEDICINE

## 2024-12-02 RX ORDER — PRAVASTATIN SODIUM 20 MG/1
20 TABLET ORAL DAILY
Qty: 90 TABLET | Refills: 3 | Status: SHIPPED | OUTPATIENT
Start: 2024-12-02

## 2024-12-02 RX ORDER — ALLOPURINOL 100 MG/1
100 TABLET ORAL DAILY
Qty: 90 TABLET | Refills: 3 | Status: SHIPPED | OUTPATIENT
Start: 2024-12-02

## 2024-12-02 RX ORDER — LISINOPRIL 20 MG/1
20 TABLET ORAL DAILY
Qty: 90 TABLET | Refills: 3 | Status: SHIPPED | OUTPATIENT
Start: 2024-12-02

## 2024-12-02 NOTE — PROGRESS NOTES
"Subjective:       Patient ID: Viktor Townsend Sr. is a 89 y.o. male.     Chief Complaint:  Annual exam     Mr. Townsend is an 89 y/o M with a PMH of gout and HTN who has presented today for his annual exam. He feels good.    He has had no recurrent flare-ups in his right hand, his left elbow, and his left great toe since starting allopurinol. He takes allopurinol every day.     Mr. Townsend's blood pressure is well controlled at this itme.      Review of Systems   Constitutional: Negative for activity change, appetite change, chills and fever.   HENT: Negative for congestion, rhinorrhea and sneezing.    Respiratory: Negative for cough, chest tightness, shortness of breath and wheezing.    Cardiovascular: Negative for chest pain, palpitations and leg swelling.   Gastrointestinal: Negative for abdominal pain, blood in stool, diarrhea and vomiting.   Genitourinary: Negative for dysuria, flank pain and frequency.   Musculoskeletal: Positive for arthralgias, joint swelling and myalgias. Negative for neck pain and neck stiffness.   Skin: Negative for color change and pallor.   Neurological: Negative for dizziness, weakness, light-headedness and headaches.   Hematological: Negative for adenopathy. Does not bruise/bleed easily.   Psychiatric/Behavioral: Negative for agitation and behavioral problems.        Objective:    Blood pressure 130/74, pulse 98, height 5' 7" (1.702 m), weight 58.3 kg (128 lb 10.2 oz), SpO2 96%.       Physical Exam   Constitutional: He is oriented to person, place He appears well-developed and well-nourished. No distress.   HENT:   Head: Normocephalic and atraumatic.   Eyes: EOM are normal. Pupils are equal, round, and reactive to light.   Neck: Normal range of motion. Neck supple.   Cardiovascular: Normal rate, regular rhythm, normal heart sounds and intact distal pulses.    Pulmonary/Chest: Effort normal and breath sounds normal. No respiratory distress. He has no wheezes.   Abdominal: Soft. Bowel " sounds are normal. He exhibits no distension. There is no tenderness.   Musculoskeletal: He exhibits no edema, tenderness or deformity.   Right shoulder range of motion limited.    Lymphadenopathy:     He has no cervical adenopathy.   Neurological: He is alert and oriented to person, place. No cranial nerve deficit.   Skin: Skin is warm and dry. Capillary refill takes less than 2 seconds. No rash noted. He is not diaphoretic. No erythema. No pallor.   Psychiatric: He has a normal mood and affect. His behavior is normal.       Assessment:       1. Annual  Chronic right shoulder pain    2. Hand edema    3. CKD (chronic kidney disease) stage 3, GFR 30-59 ml/min       hypertension, BP good today  Plan:       Mr. Townsend is an 89 y/o M with a PMH of gout and HTN who has presented today for investigation of recurrent episodes of gout as well as chronic right shoulder pain.     1. Gout  - Continue allopurinol 1x per day     2. Right inguinal hernia, repaired     3. HTN  - BP good today  lisinopril 20              Answers submitted by the patient for this visit:  Review of Systems Questionnaire (Submitted on 11/29/2024)  activity change: No  unexpected weight change: No  neck pain: No  hearing loss: No  rhinorrhea: No  trouble swallowing: No  eye discharge: No  visual disturbance: No  chest tightness: No  wheezing: No  chest pain: No  palpitations: No  blood in stool: No  constipation: No  vomiting: No  diarrhea: No  polydipsia: No  polyuria: No  difficulty urinating: No  urgency: No  hematuria: No  joint swelling: No  arthralgias: No  headaches: No  weakness: No  confusion: No  dysphoric mood: No

## 2025-05-01 ENCOUNTER — OFFICE VISIT (OUTPATIENT)
Dept: PAIN MEDICINE | Facility: CLINIC | Age: OVER 89
End: 2025-05-01
Payer: MEDICARE

## 2025-05-01 ENCOUNTER — OFFICE VISIT (OUTPATIENT)
Dept: INTERNAL MEDICINE | Facility: CLINIC | Age: OVER 89
End: 2025-05-01
Attending: FAMILY MEDICINE
Payer: MEDICARE

## 2025-05-01 VITALS
HEART RATE: 65 BPM | SYSTOLIC BLOOD PRESSURE: 162 MMHG | HEART RATE: 64 BPM | BODY MASS INDEX: 24.62 KG/M2 | SYSTOLIC BLOOD PRESSURE: 198 MMHG | OXYGEN SATURATION: 99 % | DIASTOLIC BLOOD PRESSURE: 82 MMHG | WEIGHT: 157.19 LBS | RESPIRATION RATE: 18 BRPM | WEIGHT: 157.19 LBS | OXYGEN SATURATION: 98 % | TEMPERATURE: 99 F | DIASTOLIC BLOOD PRESSURE: 80 MMHG | HEIGHT: 67 IN | BODY MASS INDEX: 24.67 KG/M2

## 2025-05-01 DIAGNOSIS — M48.062 SPINAL STENOSIS OF LUMBAR REGION WITH NEUROGENIC CLAUDICATION: ICD-10-CM

## 2025-05-01 DIAGNOSIS — M1A.09X0 IDIOPATHIC CHRONIC GOUT OF MULTIPLE SITES WITHOUT TOPHUS: ICD-10-CM

## 2025-05-01 DIAGNOSIS — N18.32 CHRONIC KIDNEY DISEASE, STAGE 3B: ICD-10-CM

## 2025-05-01 DIAGNOSIS — G95.9 DISEASE OF SPINAL CORD, UNSPECIFIED: ICD-10-CM

## 2025-05-01 DIAGNOSIS — R29.6 FALLS: ICD-10-CM

## 2025-05-01 DIAGNOSIS — N18.31 STAGE 3A CHRONIC KIDNEY DISEASE: ICD-10-CM

## 2025-05-01 DIAGNOSIS — I10 ESSENTIAL HYPERTENSION: ICD-10-CM

## 2025-05-01 DIAGNOSIS — M48.02 SPINAL STENOSIS OF CERVICAL REGION: Primary | ICD-10-CM

## 2025-05-01 DIAGNOSIS — R60.9 DEPENDENT EDEMA: Primary | ICD-10-CM

## 2025-05-01 DIAGNOSIS — I48.20 CHRONIC ATRIAL FIBRILLATION: ICD-10-CM

## 2025-05-01 PROCEDURE — 3288F FALL RISK ASSESSMENT DOCD: CPT | Mod: CPTII,S$GLB,, | Performed by: ANESTHESIOLOGY

## 2025-05-01 PROCEDURE — 99214 OFFICE O/P EST MOD 30 MIN: CPT | Mod: S$GLB,,, | Performed by: ANESTHESIOLOGY

## 2025-05-01 PROCEDURE — 1160F RVW MEDS BY RX/DR IN RCRD: CPT | Mod: CPTII,S$GLB,, | Performed by: FAMILY MEDICINE

## 2025-05-01 PROCEDURE — 3288F FALL RISK ASSESSMENT DOCD: CPT | Mod: CPTII,S$GLB,, | Performed by: FAMILY MEDICINE

## 2025-05-01 PROCEDURE — 99999 PR PBB SHADOW E&M-EST. PATIENT-LVL III: CPT | Mod: PBBFAC,,, | Performed by: FAMILY MEDICINE

## 2025-05-01 PROCEDURE — 1101F PT FALLS ASSESS-DOCD LE1/YR: CPT | Mod: CPTII,S$GLB,, | Performed by: FAMILY MEDICINE

## 2025-05-01 PROCEDURE — 99999 PR PBB SHADOW E&M-EST. PATIENT-LVL IV: CPT | Mod: PBBFAC,,, | Performed by: ANESTHESIOLOGY

## 2025-05-01 PROCEDURE — 1101F PT FALLS ASSESS-DOCD LE1/YR: CPT | Mod: CPTII,S$GLB,, | Performed by: ANESTHESIOLOGY

## 2025-05-01 PROCEDURE — G2211 COMPLEX E/M VISIT ADD ON: HCPCS | Mod: S$GLB,,, | Performed by: FAMILY MEDICINE

## 2025-05-01 PROCEDURE — 1126F AMNT PAIN NOTED NONE PRSNT: CPT | Mod: CPTII,S$GLB,, | Performed by: FAMILY MEDICINE

## 2025-05-01 PROCEDURE — 1159F MED LIST DOCD IN RCRD: CPT | Mod: CPTII,S$GLB,, | Performed by: FAMILY MEDICINE

## 2025-05-01 PROCEDURE — 99215 OFFICE O/P EST HI 40 MIN: CPT | Mod: S$GLB,,, | Performed by: FAMILY MEDICINE

## 2025-05-01 PROCEDURE — 1125F AMNT PAIN NOTED PAIN PRSNT: CPT | Mod: CPTII,S$GLB,, | Performed by: ANESTHESIOLOGY

## 2025-05-01 RX ORDER — FUROSEMIDE 20 MG/1
20 TABLET ORAL DAILY
Qty: 90 TABLET | Refills: 3 | Status: SHIPPED | OUTPATIENT
Start: 2025-05-01 | End: 2026-05-01

## 2025-05-01 NOTE — PROGRESS NOTES
"Subjective:       Patient ID: Viktor Townsend Sr. is a 89 y.o. male.     Chief Complaint:  Dependent edema     Mr. Townsend is an 89 y/o M with a PMH of gout and HTN who has presented today for bilateral dependent edema. He feels good.  He is overdue to see Cardiology    He has had no recurrent flare-ups in his right hand, his left elbow, and his left great toe since starting allopurinol. He takes allopurinol every day.     Mr. Townsend's blood pressure is not well controlled at this itme.      Review of Systems   Constitutional: Negative for activity change, appetite change, chills and fever.   HENT: Negative for congestion, rhinorrhea and sneezing.    Respiratory: Negative for cough, chest tightness, shortness of breath and wheezing.    Cardiovascular: Negative for chest pain, palpitations and leg swelling.   Gastrointestinal: Negative for abdominal pain, blood in stool, diarrhea and vomiting.   Genitourinary: Negative for dysuria, flank pain and frequency.   Musculoskeletal: Positive for arthralgias, joint swelling and myalgias. Negative for neck pain and neck stiffness.   Skin: Negative for color change and pallor.   Neurological: Negative for dizziness, weakness, light-headedness and headaches.   Hematological: Negative for adenopathy. Does not bruise/bleed easily.   Psychiatric/Behavioral: Negative for agitation and behavioral problems.        Objective:    Blood pressure (!) 162/82, pulse 65, height 5' 7" (1.702 m), weight 71.3 kg (157 lb 3 oz), SpO2 99%.       Physical Exam   Constitutional: He is oriented to person, place He appears well-developed and well-nourished. No distress.   HENT:   Head: Normocephalic and atraumatic.   Eyes: EOM are normal. Pupils are equal, round, and reactive to light.   Neck: Normal range of motion. Neck supple.   Cardiovascular: Normal rate, regular rhythm, normal heart sounds and intact distal pulses.    Pulmonary/Chest: Effort normal and breath sounds normal. No respiratory " distress. He has no wheezes.   Abdominal: Soft. Bowel sounds are normal. He exhibits no distension. There is no tenderness.   Musculoskeletal: He exhibits 4 + pretibial bruno edema.  No tenderness or deformity.   Right shoulder range of motion limited.    Neurological: He is alert and oriented to person, place. No cranial nerve deficit.   Skin: Skin is warm and dry. Capillary refill takes less than 2 seconds. No rash noted. He is not diaphoretic. No erythema. No pallor.   Psychiatric: He has a normal mood and affect. His behavior is normal.       Assessment:       1. Annual  Chronic right shoulder pain    2. Hand edema    3. CKD (chronic kidney disease) stage 3, GFR 30-59 ml/min       hypertension, BP not good today  Plan:       Mr. Townsend is an 89 y/o M with a PMH of gout and HTN who has presented today for 2+ weeks bruno oedema    chronic right shoulder pain.     1. Gout  - Continue allopurinol 1x per day     2. Right inguinal hernia, repaired     3. HTN  - BP not good today d/t not taking meds, lisinopril 20  Overdue to see cards so will arrange that and start Lasix

## 2025-05-01 NOTE — PROGRESS NOTES
PCP: Kalia Jones MD    REFERRING PHYSICIAN: No ref. provider found    CHIEF COMPLAINT: Back Pain    Original HISTORY OF PRESENT ILLNESS: Viktor Townsend Sr. presents to the clinic for the evaluation of the above pain. The pain started 4 years ago after multiple falls. Pt continues to have intermittent falls. Pt mentions he fell twice, in may 2020 and December 2020. He says after the fall in December he was unable to get out of bed for two months. worse with walking and improved by restassociated numbness and tingling in left anterior thigh. There is subjective weakness and loss of coordination diffusely in upper and lower extremities. He mentions that he is not able to button his clothes very well anymore. He also needs to brace himself in order to maintain his balance when walking. Pt bends over to relief the pain in his back when needed.     Original Pain Description:  The pain is located in the mid/lower back and is axial in nature. The pain is described as sharp. Exacerbating factors: Walking. Mitigating factors rest. Symptoms interfere with sleeping. The patient feels like symptoms have been unchanged. Patient reports significant motor weakness. Pt is still active about 15 minutes per day in his garden. He has not done any physical therapy for the low back pain. He states that the pain goes away when he lays down.     Original PAIN SCORES:  Best: Pain is 0  Worst: Pain is 7  Current: Pain is 3        11/13/2024     1:35 PM   Last 3 PDI Scores   Pain Disability Index (PDI) 56       6 weeks of Conservative therapy:  PT: Home Health 07/2024-08/2024 (6 weeks)  Chiro:  HEP: 3-4 times per day      Treatments / Medications: (Ice/Heat/NSAIDS/APAP/etc):  Tylenol      Interventional Pain Procedures: (Previous injections)  None    INTERVAL HISTORY (NEWEST AT THE BOTTOM)    Interval History 11/13/2024:  Viktor Townsend Sr. Reutrns to clinic for hip and back pain. Since his last office visit, he has completed  home health PT. He reports good benefit. He states he would like a cortisone injection to help with his back pain. His back pain is worse with standing longer than 5-10 minutes. He states after 5-10 minutes his bilateral legs feel weak and he needs to sit down. He has no pain while laying. His pain does not affect his sleeping. He continues tylenol with some benefit. He denies any perceived side effects. He is not interested in ASYA or surgical intervention at this time. He denies recent health changes. He denies recent falls or trauma. He denies new onset fever/night sweats, urinary incontinence, bowel incontinence, significant weight changes, significant motor weakness or changes, or loss of sensations. His pain today is 8/10.      Interval History 5/1/25: Viktor Townsend Sr. returns for follow up. At our last visit Mr. Townsend was noted to have severe cervical and lumbar canal stenosis with cervical signal change and falls. They didn't want surgery so they were referred to me. We discussed non-surgical options including steroid depot and TFESI. He asked for a depot shot at that time which he thinks helped. He presents back for a 6 month follow up. Fortunately, family reports no further falls and his pain appears well controlled. He has difficulty getting out of a chair, but once he is up, he is pretty mobile with his walker. Our plan really hasn't changed, given the limitations of his age.         Past Medical History:   Diagnosis Date    Colon polyps     Hiatal hernia     Hypertension     Lumbar back pain     Personal history of asbestosis      Past Surgical History:   Procedure Laterality Date    CHOLECYSTECTOMY      EYE SURGERY      PROSTATE SURGERY      REPAIR, HERNIA, INGUINAL, WITHOUT HISTORY OF PRIOR REPAIR, AGE 5 YEARS OR OLDER Right 4/12/2024    Procedure: REPAIR, HERNIA, INGUINAL, WITHOUT HISTORY OF PRIOR REPAIR, AGE 5 YEARS OR OLDER; With MESH;  Surgeon: Avery Melton Jr., MD;  Location: [facility] OR;   Service: General;  Laterality: Right;  REGIONAL BLOCK MAC     Social History     Socioeconomic History    Marital status:    Occupational History    Occupation: Retired    Tobacco Use    Smoking status: Former     Passive exposure: Past    Smokeless tobacco: Never   Substance and Sexual Activity    Alcohol use: No     Alcohol/week: 0.0 standard drinks of alcohol    Drug use: No    Sexual activity: Yes     Partners: Female   Social History Narrative    Lives w/wife.  One adult son also lives at home.  Two adult daughters live out of state.       Social Drivers of Health     Financial Resource Strain: Low Risk  (4/30/2025)    Overall Financial Resource Strain (CARDIA)     Difficulty of Paying Living Expenses: Not hard at all   Food Insecurity: No Food Insecurity (4/30/2025)    Hunger Vital Sign     Worried About Running Out of Food in the Last Year: Never true     Ran Out of Food in the Last Year: Never true   Transportation Needs: No Transportation Needs (4/30/2025)    PRAPARE - Transportation     Lack of Transportation (Medical): No     Lack of Transportation (Non-Medical): No   Physical Activity: Unknown (4/30/2025)    Exercise Vital Sign     Days of Exercise per Week: 0 days   Stress: No Stress Concern Present (4/30/2025)    [employer] - Occupational Stress Questionnaire     Feeling of Stress : Not at all   Housing Stability: Low Risk  (4/30/2025)    Housing Stability Vital Sign     Unable to Pay for Housing in the Last Year: No     Homeless in the Last Year: No     Family History   Problem Relation Name Age of Onset    Hyperlipidemia Father      Hypertension Father      Diabetes Father      No Known Problems Mother      Cancer Sister Fern     Dementia Sister Narayan     Diabetes Sister Birgit     No Known Problems Sister Flory        Review of patient's allergies indicates:  No Known Allergies    Current Outpatient Medications   Medication Sig    acetaminophen  (TYLENOL) 325 MG tablet Take 325 mg by mouth every 6 (six) hours as needed for Pain.    allopurinoL (ZYLOPRIM) 100 MG tablet Take 1 tablet (100 mg total) by mouth once daily. TAKE 1 TABLET(300 MG) BY MOUTH EVERY DAY    calcium carbonate (TUMS) 200 mg calcium (500 mg) chewable tablet Take 1 tablet by mouth 3 (three) times daily as needed for Heartburn. (Patient not taking: Reported on 5/1/2025)    cyanocobalamin, vitamin B-12, (VITAMIN B-12 ORAL) Take by mouth.    diphenhydrAMINE-acetaminophen (TYLENOL PM)  mg Tab Take 1 tablet by mouth nightly as needed.    docusate sodium (COLACE) 100 MG capsule Take 1 capsule (100 mg total) by mouth 2 (two) times daily. (Patient not taking: Reported on 5/1/2025)    ergocalciferol (VITAMIN D2) 50,000 unit Cap Take 50,000 Units by mouth every 7 days.    furosemide (LASIX) 20 MG tablet Take 1 tablet (20 mg total) by mouth once daily.    ginkgo biloba (GINKGO ORAL) Take by mouth.    HYDROcodone-acetaminophen (NORCO) 5-325 mg per tablet Take 1 tablet by mouth every 6 (six) hours as needed for Pain. (Patient not taking: Reported on 5/1/2025)    hydrocortisone 2.5 % cream Apply topically 2 (two) times daily. (Patient not taking: Reported on 5/1/2025)    lisinopriL (PRINIVIL,ZESTRIL) 20 MG tablet Take 1 tablet (20 mg total) by mouth once daily.    pravastatin (PRAVACHOL) 20 MG tablet Take 1 tablet (20 mg total) by mouth once daily.     No current facility-administered medications for this visit.       ROS:  GENERAL: No fever. No chills. No fatigue. Denies weight loss. Denies weight gain.  HEENT: Denies headaches. Denies vision change. Denies eye pain. Denies double vision. Denies ear pain.   CV: Denies chest pain.   PULM: Denies of shortness of breath.  GI: Denies constipation. No diarrhea. No abdominal pain. Denies nausea. Denies vomiting. No blood in stool.  HEME: Denies bleeding problems.  : Denies urgency. No painful urination. No blood in urine.  MS: Denies joint stiffness.  Denies joint swelling.  Back pain.  SKIN: Denies rash.   NEURO: Denies seizures. No weakness.  PSYCH:  Denies difficulty sleeping. No anxiety. Denies depression. No suicidal thoughts.       VITALS:   Vitals:    05/01/25 1458   BP: (!) 198/80   Pulse: 64   Resp: 18   Temp: 98.8 °F (37.1 °C)   SpO2: 98%   Weight: 71.3 kg (157 lb 3 oz)   PainSc:   5           PHYSICAL EXAM:   GENERAL: Well appearing, in no acute distress, alert and oriented x3.  PSYCH:  Mood and affect appropriate.  SKIN: Skin color, texture, turgor normal, no rashes or lesions.  HEENT:  Normocephalic, atraumatic. Cranial nerves grossly intact.  NECK: No pain to palpation over the cervical paraspinous muscles. No pain to palpation over facets. No pain with neck flexion, extension, or lateral flexion.   PULM: No evidence of respiratory difficulty, symmetric chest rise.  GI:  Non-distended  BACK: Decreased range of motion in flexion and extension without pain. No pain with facet loading bilaterally. Tenderness to palpation over lumbar spine.  No pain to palpation over facet joints bilaterally. There is no pain with palpation over the sacroiliac joints bilaterally. No pain over lumbar musculature.   EXTREMITIES: No deformities, edema, or skin discoloration.   MUSCULOSKELETAL: Shoulder, hip, and knee provocative maneuvers are negative. Atrophy in the hands, thighs and calves bilaterally.   NEURO: Sensation is equal and appropriate bilaterally. Bilateral upper and lower extremity strength is normal and symmetric. Bilateral upper and lower extremity muscle stretch reflexes are physiologic and symmetric. Plantar response are downgoing. Straight leg raising in the supine position is negative to radicular pain.   GAIT: walker      IMAGING:    MRI SPINE CERVICAL-THORACIC-LUMBAR WITHOUT CONTRAST (XPD)     CLINICAL HISTORY:  r/o myelopathy, possible L5 compression fx; Unspecified abnormalities of gait and mobility     TECHNIQUE:  Multiplanar, multisequence MR  images of the cervical, thoracic, and lumbar spine were performed without contrast.     COMPARISON:  Lumbar radiograph 03/26/2021     FINDINGS:  Cervical spine:     Degenerative changes of the atlantodental interval.     Grade 1 anterolisthesis C7 on T1.     Vertebral body heights are within normal limits.  No marrow placement process.  No fracture.     Multilevel disc space height loss and disc desiccation.     Cord flattening and increased signal at C4-C5 and C5-C6 consistent with myelomalacia.     Degenerative findings:     C2-C3: Posterior disc osteophyte complex, uncovertebral spurring, and facet arthropathy.  No significant spinal canal stenosis or neural foraminal narrowing.     C3-C4: Posterior disc osteophyte complex, uncovertebral spurring, and facet arthropathy.  Moderate spinal canal stenosis.  Severe bilateral neural foraminal narrowing.     C4-C5: Posterior disc osteophyte complex, uncovertebral spurring, and facet arthropathy.  Severe spinal canal stenosis.  Severe bilateral neural foraminal narrowing.     C5-C6: Posterior disc osteophyte complex, uncovertebral spurring, and facet arthropathy.  Severe spinal canal stenosis.  Moderate bilateral neural foraminal narrowing.     C6-C7: Posterior disc osteophyte complex, uncovertebral spurring, and facet arthropathy.  Moderate spinal canal stenosis and moderate bilateral neural foraminal narrowing.     C7-T1: Grade 1 anterolisthesis.  Posterior disc osteophyte complex and facet arthropathy.  Moderate spinal canal stenosis and moderate bilateral neural foraminal narrowing.     Thoracic spine:     Satisfactory alignment.     Vertebral body heights are within normal limits.  No marrow replacement process.  No fracture.     Multilevel intervertebral disc space height loss.     The thoracic cord is within normal limits.     T11-T12 circumferential disc bulge, facet arthropathy, and ligamentum flavum hypertrophy resulting in mild spinal canal stenosis.  T3-T4  ligamentum flavum hypertrophy and T8-T9 posterior disc bulge without significant spinal canal stenosis.  No significant neural foraminal narrowing.     Lumbar spine:     Grade 1 anterolisthesis L5 on S1.     No compression fractures identified.  No marrow placement process.     Severe right-sided degenerative disc disease, noting severe disc height loss with prominent sub endplate marrow edema.  There is advanced right-sided facet arthropathy with joint hypertrophy prominent synovial fluid and surrounding inflammatory changes.     Conus ends at L1.     L1-L2: Facet arthropathy.  No significant spinal canal stenosis or neural foraminal narrowing.     L2-L3: Small circumferential disc bulge and facet arthropathy.  No significant spinal canal stenosis or neural foraminal narrowing.     L3-L4: Circumferential disc bulge, facet arthropathy, and ligamentum flavum hypertrophy.  Mild spinal canal stenosis.  Mild bilateral neural foraminal narrowing.     L4-L5: Circumferential disc bulge, facet arthropathy, and ligamentum flavum hypertrophy.  Severe spinal canal stenosis.  Moderate left and mild right neural foraminal narrowing.     L5-S1: Grade 1 anterolisthesis with uncovered disc, facet arthropathy, ligamentum flavum hypertrophy.  Severe spinal canal stenosis.  Severe right and mild left neural foraminal narrowing.     Subcutaneous soft tissue edema of the lower back.     Impression:     Cervical spondylosis, resulting in moderate/ severe spinal canal and neural foraminal stenosis from C4-5 through C7-T1, as above.  Increased cord signal noted at the C4-5 and C5-6 levels, concerning for edema and/or myelomalacia.     Lumbar spondylosis, resulting in severe spinal canal stenosis and moderate/ severe neural foraminal narrowing at L4-5 and L5-S1, as above.  Partial lumbarization of S1 noted, normal variant.     Advanced right-sided degenerative disc disease and facet arthropathy at L5-S1.  No fracture identified.     Mild  spinal canal stenosis at T11-12.    ASSESSMENT: 89 y.o. year old male with pain, consistent with:    Encounter Diagnoses   Name Primary?    Spinal stenosis of cervical region Yes    Spinal stenosis of lumbar region with neurogenic claudication     Falls        DISCUSSION: Viktor Townsend . is a nonagenarian who comes to us with low back pain, cervical neck pain, weakness in the upper and lower extremities with prior falls and claudication in the calves. Imaging shows severe cervical canal stenosis with cord signal changes in 2023. He also has severe lumbar canal stenosis also in 2023. They decline surgical treatments.     PLAN:  Again conferred with Mr. Townsend and his son.   Discussed treatment options: Depot steroid vs TFESI should it be necessary  Follow up as needed    The above plan and management options were discussed at length with patient. Patient is in agreement with the above and verbalized understanding.     Marsha Lewis MD  05/01/2025

## 2025-06-23 DIAGNOSIS — Z00.00 ENCOUNTER FOR MEDICARE ANNUAL WELLNESS EXAM: ICD-10-CM

## 2025-06-25 ENCOUNTER — OFFICE VISIT (OUTPATIENT)
Dept: CARDIOLOGY | Facility: CLINIC | Age: OVER 89
End: 2025-06-25
Payer: MEDICARE

## 2025-06-25 VITALS — DIASTOLIC BLOOD PRESSURE: 78 MMHG | OXYGEN SATURATION: 97 % | HEART RATE: 79 BPM | SYSTOLIC BLOOD PRESSURE: 126 MMHG

## 2025-06-25 DIAGNOSIS — I48.91 ATRIAL FIBRILLATION, UNSPECIFIED TYPE: Primary | ICD-10-CM

## 2025-06-25 DIAGNOSIS — I70.0 AORTIC ATHEROSCLEROSIS: ICD-10-CM

## 2025-06-25 DIAGNOSIS — I10 ESSENTIAL HYPERTENSION: ICD-10-CM

## 2025-06-25 DIAGNOSIS — E78.49 OTHER HYPERLIPIDEMIA: ICD-10-CM

## 2025-06-25 DIAGNOSIS — E78.00 PURE HYPERCHOLESTEROLEMIA: ICD-10-CM

## 2025-06-25 LAB
OHS QRS DURATION: 82 MS
OHS QTC CALCULATION: 442 MS

## 2025-06-25 PROCEDURE — 99999 PR PBB SHADOW E&M-EST. PATIENT-LVL IV: CPT | Mod: PBBFAC,,, | Performed by: INTERNAL MEDICINE

## 2025-06-25 PROCEDURE — 1101F PT FALLS ASSESS-DOCD LE1/YR: CPT | Mod: CPTII,S$GLB,, | Performed by: INTERNAL MEDICINE

## 2025-06-25 PROCEDURE — 3288F FALL RISK ASSESSMENT DOCD: CPT | Mod: CPTII,S$GLB,, | Performed by: INTERNAL MEDICINE

## 2025-06-25 PROCEDURE — 1159F MED LIST DOCD IN RCRD: CPT | Mod: CPTII,S$GLB,, | Performed by: INTERNAL MEDICINE

## 2025-06-25 PROCEDURE — 1125F AMNT PAIN NOTED PAIN PRSNT: CPT | Mod: CPTII,S$GLB,, | Performed by: INTERNAL MEDICINE

## 2025-06-25 PROCEDURE — 99214 OFFICE O/P EST MOD 30 MIN: CPT | Mod: S$GLB,,, | Performed by: INTERNAL MEDICINE

## 2025-06-25 NOTE — PROGRESS NOTES
Cardiology    6/25/2025  11:31 AM    Problem list  Problem List[1]    History of Present Illness    Mr. Townsend presents today for follow up.  Last seen in April 2024.  He has a history of AF and confirms being prescribed an apixaban but he has not been taking it. He forgot to bring his current medication list to the appointment. He reports discontinuing unspecified medications since May 1st. He currently uses a walker for ambulation due to knee problems that impact his mobility. He is able to walk a block or more at times and has been doing so for the past 2 months. He continues to engage in exercise despite mobility challenges. He has a history of a back injury 2-3 years ago involving a displaced bone near the spine. He was evaluated by a specialist who determined surgical intervention was not feasible due to the bone's proximity to the spine. He reports two falls with head injury within the past year, occurring 1 year ago. He denies any recent falls.  He denies any chest pain, shortness breath, palpitation or syncope.  He denies any TIA or CVA.          Medications  Current Medications[2]   Prior to Admission medications    Medication Sig Start Date End Date Taking? Authorizing Provider   acetaminophen (TYLENOL) 325 MG tablet Take 325 mg by mouth every 6 (six) hours as needed for Pain.   Yes Provider, Historical   furosemide (LASIX) 20 MG tablet Take 1 tablet (20 mg total) by mouth once daily. 5/1/25 5/1/26 Yes Kalia Jones MD   allopurinoL (ZYLOPRIM) 100 MG tablet Take 1 tablet (100 mg total) by mouth once daily. TAKE 1 TABLET(300 MG) BY MOUTH EVERY DAY  Patient not taking: Reported on 6/25/2025 12/2/24   Kalia Jones MD   calcium carbonate (TUMS) 200 mg calcium (500 mg) chewable tablet Take 1 tablet by mouth 3 (three) times daily as needed for Heartburn.  Patient not taking: Reported on 6/25/2025    Provider, Historical   cyanocobalamin, vitamin B-12, (VITAMIN B-12 ORAL) Take by  mouth.  Patient not taking: Reported on 6/25/2025    Provider, Historical   diphenhydrAMINE-acetaminophen (TYLENOL PM)  mg Tab Take 1 tablet by mouth nightly as needed.  Patient not taking: Reported on 6/25/2025    Provider, Historical   docusate sodium (COLACE) 100 MG capsule Take 1 capsule (100 mg total) by mouth 2 (two) times daily.  Patient not taking: Reported on 6/25/2025 9/8/23   Renetta Rios PA-C   ergocalciferol (VITAMIN D2) 50,000 unit Cap Take 50,000 Units by mouth every 7 days.  Patient not taking: Reported on 6/25/2025    Provider, Historical   ginkgo biloba (GINKGO ORAL) Take by mouth.  Patient not taking: Reported on 6/25/2025    Provider, Historical   HYDROcodone-acetaminophen (NORCO) 5-325 mg per tablet Take 1 tablet by mouth every 6 (six) hours as needed for Pain.  Patient not taking: Reported on 12/2/2024 4/22/24   Avery Melton Jr., MD   hydrocortisone 2.5 % cream Apply topically 2 (two) times daily.  Patient not taking: Reported on 6/25/2025 5/7/21   Renetta Rios PA-C   lisinopriL (PRINIVIL,ZESTRIL) 20 MG tablet Take 1 tablet (20 mg total) by mouth once daily.  Patient not taking: Reported on 6/25/2025 12/2/24   Kalia Jones MD   pravastatin (PRAVACHOL) 20 MG tablet Take 1 tablet (20 mg total) by mouth once daily.  Patient not taking: Reported on 6/25/2025 12/2/24   Kalia Jones MD         History  Past Medical History:   Diagnosis Date    Colon polyps     Hiatal hernia     Hypertension     Lumbar back pain     Personal history of asbestosis      Past Surgical History:   Procedure Laterality Date    CHOLECYSTECTOMY      EYE SURGERY      PROSTATE SURGERY      REPAIR, HERNIA, INGUINAL, WITHOUT HISTORY OF PRIOR REPAIR, AGE 5 YEARS OR OLDER Right 4/12/2024    Procedure: REPAIR, HERNIA, INGUINAL, WITHOUT HISTORY OF PRIOR REPAIR, AGE 5 YEARS OR OLDER; With MESH;  Surgeon: Avery Melton Jr., MD;  Location: [facility];  Service: General;  Laterality: Right;   REGIONAL BLOCK MAC     Social History[3]      Allergies  Review of patient's allergies indicates:  No Known Allergies      Review of Systems   Review of Systems   Constitutional: Negative for decreased appetite, fever and weight loss.   HENT:  Negative for congestion and nosebleeds.    Eyes:  Negative for double vision, vision loss in left eye, vision loss in right eye and visual disturbance.   Cardiovascular:  Negative for chest pain, claudication, cyanosis, dyspnea on exertion, irregular heartbeat, leg swelling, near-syncope, orthopnea, palpitations, paroxysmal nocturnal dyspnea and syncope.   Respiratory:  Negative for cough, hemoptysis, shortness of breath, sleep disturbances due to breathing, snoring, sputum production and wheezing.    Endocrine: Negative for cold intolerance and heat intolerance.   Skin:  Negative for nail changes and rash.   Musculoskeletal:  Negative for joint pain, muscle cramps, muscle weakness and myalgias.   Gastrointestinal:  Negative for change in bowel habit, heartburn, hematemesis, hematochezia, hemorrhoids and melena.   Neurological:  Negative for dizziness, focal weakness and headaches.         Physical Exam  Wt Readings from Last 1 Encounters:   05/01/25 71.3 kg (157 lb 3 oz)     BP Readings from Last 3 Encounters:   06/25/25 126/78   05/01/25 (!) 198/80   05/01/25 (!) 162/82     Pulse Readings from Last 1 Encounters:   06/25/25 79     There is no height or weight on file to calculate BMI.    Physical Exam  Constitutional:       Appearance: He is well-developed.   HENT:      Head: Atraumatic.   Eyes:      General: No scleral icterus.  Neck:      Vascular: Normal carotid pulses. No carotid bruit, hepatojugular reflux or JVD.   Cardiovascular:      Rate and Rhythm: Normal rate. Rhythm irregular.      Chest Wall: PMI is not displaced.      Pulses: Intact distal pulses.           Carotid pulses are 2+ on the right side and 2+ on the left side.       Radial pulses are 2+ on the right  side and 2+ on the left side.        Dorsalis pedis pulses are 2+ on the right side and 2+ on the left side.      Heart sounds: Normal heart sounds, S1 normal and S2 normal. No murmur heard.     No friction rub.   Pulmonary:      Effort: Pulmonary effort is normal. No respiratory distress.      Breath sounds: Normal breath sounds. No stridor. No wheezing or rales.   Chest:      Chest wall: No tenderness.   Abdominal:      General: Bowel sounds are normal.      Palpations: Abdomen is soft.   Musculoskeletal:      Cervical back: Neck supple. No edema.   Skin:     General: Skin is warm and dry.      Nails: There is no clubbing.   Neurological:      Mental Status: He is alert and oriented to person, place, and time.   Psychiatric:         Behavior: Behavior normal.         Thought Content: Thought content normal.           [id number] score: 4  HAS-BLED score: 3      If you answer NO to any of the four criteria below, the patient does not meet the WATCHMAN implant eligibility requirements.       1. Patient has non-valvular atrial fibrillation: No   2. Patient has an increased risk for stroke and is recommended for oral anticoagulation (OAC): Yes   3. Patient is suitable for short-term anticoagulation therapy but deemed unable to take long-term OAC: Yes   4. Patient has an appropriate rationale to seek a non-pharmacological alternative to OACs. Specific factors include: History of bleeding or increased bleeding risk and Increased bleeding risk not reflected by HAS-BLED score        Assessment  1. Atrial fibrillation, unspecified type (Primary)  EKG today showed atrial flutter with a ventricular response rate of 73  - EKG 12-lead  - Ambulatory referral/consult to Electrophysiology; Future    2. Aortic atherosclerosis  Unchanged    3. Other hyperlipidemia  Unchanged    5. Essential hypertension  Controlled        Plan and Discussion  Discussed with patient and his son.  His EKG today which showed atrial flutter with  ventricular rate of 73.  Discussed his history of atrial fibrillation and his CHADS-VASc score of 4.  However he has had falls and not a candidate for anticoagulation due to gait instability.  Will refer to Dr. Buckley for LAAO device consideration.    Follow Up  6 months        Albin Mecredes MD, F.A.C.C, F.S.C.A.I.      Total professional time spent for the encounter: 30 minutes  Time was spent preparing to see the patient, reviewing results of prior testing, obtaining and/or reviewing separately obtained history, performing a medically appropriate examination and interview, counseling and educating the patient/family, ordering medications/tests/procedures, referring and communicating with other health care professionals, documenting clinical information in the electronic health record, and independently interpreting results.    This note was generated with the assistance of ambient listening technology. Verbal consent was obtained by the patient and accompanying visitor(s) for the recording of patient appointment to facilitate this note. I attest to having reviewed and edited the generated note for accuracy, though some syntax or spelling errors may persist. Please contact the author of this note for any clarification.           [1]   Patient Active Problem List  Diagnosis    Essential hypertension    Personal history of prostate cancer    Colon polyps    Hoarseness    Hiatal hernia    Gout, unspecified    Trigger finger, acquired    Diverticulosis    Hyperlipidemia    Prediabetes    Chronic kidney disease, stage 3b    Left inguinal hernia    Right shoulder pain    Hypertensive kidney disease with stage 3 chronic kidney disease    Paraproteinemia    MGUS (monoclonal gammopathy of unknown significance)    Anemia    B12 deficiency    Disease of spinal cord, unspecified    Aortic atherosclerosis    Spinal stenosis of lumbar region without neurogenic claudication    Back pain    Atrial fibrillation   [2]   Current  Outpatient Medications   Medication Sig Dispense Refill    acetaminophen (TYLENOL) 325 MG tablet Take 325 mg by mouth every 6 (six) hours as needed for Pain.      furosemide (LASIX) 20 MG tablet Take 1 tablet (20 mg total) by mouth once daily. 90 tablet 3    allopurinoL (ZYLOPRIM) 100 MG tablet Take 1 tablet (100 mg total) by mouth once daily. TAKE 1 TABLET(300 MG) BY MOUTH EVERY DAY (Patient not taking: Reported on 6/25/2025) 90 tablet 3    calcium carbonate (TUMS) 200 mg calcium (500 mg) chewable tablet Take 1 tablet by mouth 3 (three) times daily as needed for Heartburn. (Patient not taking: Reported on 6/25/2025)      cyanocobalamin, vitamin B-12, (VITAMIN B-12 ORAL) Take by mouth. (Patient not taking: Reported on 6/25/2025)      diphenhydrAMINE-acetaminophen (TYLENOL PM)  mg Tab Take 1 tablet by mouth nightly as needed. (Patient not taking: Reported on 6/25/2025)      docusate sodium (COLACE) 100 MG capsule Take 1 capsule (100 mg total) by mouth 2 (two) times daily. (Patient not taking: Reported on 6/25/2025) 30 capsule 0    ergocalciferol (VITAMIN D2) 50,000 unit Cap Take 50,000 Units by mouth every 7 days. (Patient not taking: Reported on 6/25/2025)      ginkgo biloba (GINKGO ORAL) Take by mouth. (Patient not taking: Reported on 6/25/2025)      HYDROcodone-acetaminophen (NORCO) 5-325 mg per tablet Take 1 tablet by mouth every 6 (six) hours as needed for Pain. (Patient not taking: Reported on 12/2/2024) 20 tablet 0    hydrocortisone 2.5 % cream Apply topically 2 (two) times daily. (Patient not taking: Reported on 6/25/2025) 28 g 0    lisinopriL (PRINIVIL,ZESTRIL) 20 MG tablet Take 1 tablet (20 mg total) by mouth once daily. (Patient not taking: Reported on 6/25/2025) 90 tablet 3    pravastatin (PRAVACHOL) 20 MG tablet Take 1 tablet (20 mg total) by mouth once daily. (Patient not taking: Reported on 6/25/2025) 90 tablet 3     No current facility-administered medications for this visit.   [3]   Social  History  Socioeconomic History    Marital status:    Occupational History    Occupation: Retired    Tobacco Use    Smoking status: Former     Passive exposure: Past    Smokeless tobacco: Never   Substance and Sexual Activity    Alcohol use: No     Alcohol/week: 0.0 standard drinks of alcohol    Drug use: No    Sexual activity: Yes     Partners: Female   Social History Narrative    Lives w/wife.  One adult son also lives at home.  Two adult daughters live out of state.       Social Drivers of Health     Financial Resource Strain: Low Risk  (4/30/2025)    Overall Financial Resource Strain (CARDIA)     Difficulty of Paying Living Expenses: Not hard at all   Food Insecurity: No Food Insecurity (4/30/2025)    Hunger Vital Sign     Worried About Running Out of Food in the Last Year: Never true     Ran Out of Food in the Last Year: Never true   Transportation Needs: No Transportation Needs (4/30/2025)    PRAPARE - Transportation     Lack of Transportation (Medical): No     Lack of Transportation (Non-Medical): No   Physical Activity: Unknown (4/30/2025)    Exercise Vital Sign     Days of Exercise per Week: 0 days   Stress: No Stress Concern Present (4/30/2025)    [employer] - Occupational Stress Questionnaire     Feeling of Stress : Not at all   Housing Stability: Low Risk  (4/30/2025)    Housing Stability Vital Sign     Unable to Pay for Housing in the Last Year: No     Homeless in the Last Year: No

## 2025-06-25 NOTE — PATIENT INSTRUCTIONS
Refer to see Dr Buckley for your irregular heart rate and have a device to protect you from a stroke.

## 2025-08-13 ENCOUNTER — OFFICE VISIT (OUTPATIENT)
Dept: PAIN MEDICINE | Facility: CLINIC | Age: OVER 89
End: 2025-08-13
Payer: MEDICARE

## 2025-08-13 ENCOUNTER — HOSPITAL ENCOUNTER (INPATIENT)
Facility: OTHER | Age: OVER 89
LOS: 2 days | Discharge: HOSPICE | DRG: 291 | End: 2025-08-15
Attending: EMERGENCY MEDICINE | Admitting: EMERGENCY MEDICINE
Payer: MEDICARE

## 2025-08-13 VITALS
HEART RATE: 107 BPM | WEIGHT: 157.19 LBS | OXYGEN SATURATION: 100 % | TEMPERATURE: 98 F | HEIGHT: 67 IN | RESPIRATION RATE: 18 BRPM | BODY MASS INDEX: 24.67 KG/M2

## 2025-08-13 DIAGNOSIS — R55 SYNCOPE: ICD-10-CM

## 2025-08-13 DIAGNOSIS — L89.151 PRESSURE INJURY OF SACRAL REGION, STAGE 1: ICD-10-CM

## 2025-08-13 DIAGNOSIS — K40.90 RIGHT INGUINAL HERNIA: ICD-10-CM

## 2025-08-13 DIAGNOSIS — M48.062 SPINAL STENOSIS OF LUMBAR REGION WITH NEUROGENIC CLAUDICATION: Primary | ICD-10-CM

## 2025-08-13 DIAGNOSIS — R91.1 PULMONARY NODULE: ICD-10-CM

## 2025-08-13 DIAGNOSIS — R55 HYPOTENSIVE SYNCOPE: ICD-10-CM

## 2025-08-13 DIAGNOSIS — I21.4 NSTEMI (NON-ST ELEVATED MYOCARDIAL INFARCTION): ICD-10-CM

## 2025-08-13 DIAGNOSIS — M48.061 SPINAL STENOSIS OF LUMBAR REGION WITHOUT NEUROGENIC CLAUDICATION: Primary | ICD-10-CM

## 2025-08-13 DIAGNOSIS — N18.9 CHRONIC KIDNEY DISEASE, UNSPECIFIED CKD STAGE: ICD-10-CM

## 2025-08-13 DIAGNOSIS — M54.50 DORSALGIA OF LUMBOSACRAL REGION: ICD-10-CM

## 2025-08-13 DIAGNOSIS — I50.33 ACUTE ON CHRONIC DIASTOLIC HEART FAILURE: ICD-10-CM

## 2025-08-13 DIAGNOSIS — G95.81 CONUS MEDULLARIS SYNDROME: ICD-10-CM

## 2025-08-13 PROBLEM — G89.29 CHRONIC LOW BACK PAIN: Status: ACTIVE | Noted: 2023-08-09

## 2025-08-13 PROBLEM — G89.29 CHRONIC PAIN: Status: ACTIVE | Noted: 2025-08-13

## 2025-08-13 PROBLEM — D63.8 ANEMIA OF CHRONIC DISEASE: Status: ACTIVE | Noted: 2018-09-26

## 2025-08-13 PROBLEM — I48.92 ATRIAL FIBRILLATION/FLUTTER: Status: ACTIVE | Noted: 2024-04-09

## 2025-08-13 PROBLEM — I95.9 HYPOTENSION: Status: ACTIVE | Noted: 2025-08-13

## 2025-08-13 PROBLEM — Z91.81 HISTORY OF FALL: Status: ACTIVE | Noted: 2025-08-13

## 2025-08-13 LAB
ABSOLUTE EOSINOPHIL (OHS): 0.05 K/UL
ABSOLUTE MONOCYTE (OHS): 0.77 K/UL (ref 0.3–1)
ABSOLUTE NEUTROPHIL COUNT (OHS): 5.01 K/UL (ref 1.8–7.7)
ALBUMIN SERPL BCP-MCNC: 3.5 G/DL (ref 3.5–5.2)
ALP SERPL-CCNC: 111 UNIT/L (ref 40–150)
ALT SERPL W/O P-5'-P-CCNC: 15 UNIT/L (ref 10–44)
ANION GAP (OHS): 11 MMOL/L (ref 8–16)
AST SERPL-CCNC: 44 UNIT/L (ref 11–45)
BASOPHILS # BLD AUTO: 0.09 K/UL
BASOPHILS NFR BLD AUTO: 1.2 %
BILIRUB SERPL-MCNC: 1.9 MG/DL (ref 0.1–1)
BILIRUB UR QL STRIP.AUTO: NEGATIVE
BUN SERPL-MCNC: 26 MG/DL (ref 10–30)
CALCIUM SERPL-MCNC: 9.3 MG/DL (ref 8.7–10.5)
CHLORIDE SERPL-SCNC: 104 MMOL/L (ref 95–110)
CLARITY UR: CLEAR
CO2 SERPL-SCNC: 24 MMOL/L (ref 23–29)
COLOR UR AUTO: YELLOW
CREAT SERPL-MCNC: 1.9 MG/DL (ref 0.5–1.4)
D DIMER PPP IA.FEU-MCNC: 1.77 MG/L FEU
ERYTHROCYTE [DISTWIDTH] IN BLOOD BY AUTOMATED COUNT: 14.5 % (ref 11.5–14.5)
GFR SERPLBLD CREATININE-BSD FMLA CKD-EPI: 33 ML/MIN/1.73/M2
GLUCOSE SERPL-MCNC: 121 MG/DL (ref 70–110)
GLUCOSE UR QL STRIP: NEGATIVE
HCT VFR BLD AUTO: 44 % (ref 40–54)
HCV AB SERPL QL IA: NORMAL
HGB BLD-MCNC: 14 GM/DL (ref 14–18)
HGB UR QL STRIP: NEGATIVE
HIV 1+2 AB+HIV1 P24 AG SERPL QL IA: NORMAL
IMM GRANULOCYTES # BLD AUTO: 0.04 K/UL (ref 0–0.04)
IMM GRANULOCYTES NFR BLD AUTO: 0.5 % (ref 0–0.5)
KETONES UR QL STRIP: ABNORMAL
LACTATE SERPL-SCNC: 1.5 MMOL/L (ref 0.5–2.2)
LACTATE SERPL-SCNC: 2.5 MMOL/L (ref 0.5–2.2)
LDH SERPL L TO P-CCNC: 4 MMOL/L (ref 0.5–2.2)
LEUKOCYTE ESTERASE UR QL STRIP: NEGATIVE
LYMPHOCYTES # BLD AUTO: 1.61 K/UL (ref 1–4.8)
MAGNESIUM SERPL-MCNC: 1.9 MG/DL (ref 1.6–2.6)
MCH RBC QN AUTO: 28.7 PG (ref 27–31)
MCHC RBC AUTO-ENTMCNC: 31.8 G/DL (ref 32–36)
MCV RBC AUTO: 90 FL (ref 82–98)
NITRITE UR QL STRIP: NEGATIVE
NT-PROBNP SERPL-MCNC: ABNORMAL PG/ML
NUCLEATED RBC (/100WBC) (OHS): 0 /100 WBC
PH UR STRIP: 6 [PH]
PLATELET # BLD AUTO: 181 K/UL (ref 150–450)
PMV BLD AUTO: 10 FL (ref 9.2–12.9)
POTASSIUM SERPL-SCNC: 3.6 MMOL/L (ref 3.5–5.1)
PROCALCITONIN SERPL-MCNC: 0.05 NG/ML
PROT SERPL-MCNC: 6.8 GM/DL (ref 6–8.4)
PROT UR QL STRIP: ABNORMAL
RBC # BLD AUTO: 4.88 M/UL (ref 4.6–6.2)
RELATIVE EOSINOPHIL (OHS): 0.7 %
RELATIVE LYMPHOCYTE (OHS): 21.3 % (ref 18–48)
RELATIVE MONOCYTE (OHS): 10.2 % (ref 4–15)
RELATIVE NEUTROPHIL (OHS): 66.1 % (ref 38–73)
SAMPLE: ABNORMAL
SODIUM SERPL-SCNC: 139 MMOL/L (ref 136–145)
SP GR UR STRIP: 1.02
TROPONIN I SERPL HS-MCNC: 218 NG/L
TROPONIN I SERPL HS-MCNC: 306 NG/L
UROBILINOGEN UR STRIP-ACNC: ABNORMAL EU/DL
WBC # BLD AUTO: 7.57 K/UL (ref 3.9–12.7)

## 2025-08-13 PROCEDURE — 93005 ELECTROCARDIOGRAM TRACING: CPT

## 2025-08-13 PROCEDURE — 93010 ELECTROCARDIOGRAM REPORT: CPT | Mod: ,,, | Performed by: INTERNAL MEDICINE

## 2025-08-13 PROCEDURE — 99215 OFFICE O/P EST HI 40 MIN: CPT | Mod: S$GLB,,, | Performed by: ANESTHESIOLOGY

## 2025-08-13 PROCEDURE — 63600175 PHARM REV CODE 636 W HCPCS: Performed by: NURSE PRACTITIONER

## 2025-08-13 PROCEDURE — 21400001 HC TELEMETRY ROOM

## 2025-08-13 PROCEDURE — 84145 PROCALCITONIN (PCT): CPT | Performed by: NURSE PRACTITIONER

## 2025-08-13 PROCEDURE — 96360 HYDRATION IV INFUSION INIT: CPT

## 2025-08-13 PROCEDURE — 1159F MED LIST DOCD IN RCRD: CPT | Mod: CPTII,S$GLB,, | Performed by: ANESTHESIOLOGY

## 2025-08-13 PROCEDURE — 83880 ASSAY OF NATRIURETIC PEPTIDE: CPT | Performed by: EMERGENCY MEDICINE

## 2025-08-13 PROCEDURE — 84484 ASSAY OF TROPONIN QUANT: CPT | Performed by: EMERGENCY MEDICINE

## 2025-08-13 PROCEDURE — 1125F AMNT PAIN NOTED PAIN PRSNT: CPT | Mod: CPTII,S$GLB,, | Performed by: ANESTHESIOLOGY

## 2025-08-13 PROCEDURE — 81003 URINALYSIS AUTO W/O SCOPE: CPT | Performed by: EMERGENCY MEDICINE

## 2025-08-13 PROCEDURE — 11000001 HC ACUTE MED/SURG PRIVATE ROOM

## 2025-08-13 PROCEDURE — 80053 COMPREHEN METABOLIC PANEL: CPT | Performed by: EMERGENCY MEDICINE

## 2025-08-13 PROCEDURE — 86803 HEPATITIS C AB TEST: CPT | Performed by: NURSE PRACTITIONER

## 2025-08-13 PROCEDURE — 99285 EMERGENCY DEPT VISIT HI MDM: CPT | Mod: 25

## 2025-08-13 PROCEDURE — 85379 FIBRIN DEGRADATION QUANT: CPT | Performed by: EMERGENCY MEDICINE

## 2025-08-13 PROCEDURE — 63600175 PHARM REV CODE 636 W HCPCS: Performed by: EMERGENCY MEDICINE

## 2025-08-13 PROCEDURE — 99999 PR PBB SHADOW E&M-EST. PATIENT-LVL V: CPT | Mod: PBBFAC,,, | Performed by: ANESTHESIOLOGY

## 2025-08-13 PROCEDURE — 25500020 PHARM REV CODE 255: Performed by: EMERGENCY MEDICINE

## 2025-08-13 PROCEDURE — 87389 HIV-1 AG W/HIV-1&-2 AB AG IA: CPT | Performed by: NURSE PRACTITIONER

## 2025-08-13 PROCEDURE — 83735 ASSAY OF MAGNESIUM: CPT | Performed by: NURSE PRACTITIONER

## 2025-08-13 PROCEDURE — G2211 COMPLEX E/M VISIT ADD ON: HCPCS | Mod: S$GLB,,, | Performed by: ANESTHESIOLOGY

## 2025-08-13 PROCEDURE — 84484 ASSAY OF TROPONIN QUANT: CPT | Performed by: NURSE PRACTITIONER

## 2025-08-13 PROCEDURE — 85025 COMPLETE CBC W/AUTO DIFF WBC: CPT | Performed by: EMERGENCY MEDICINE

## 2025-08-13 PROCEDURE — 83605 ASSAY OF LACTIC ACID: CPT | Performed by: EMERGENCY MEDICINE

## 2025-08-13 RX ORDER — ONDANSETRON HYDROCHLORIDE 2 MG/ML
8 INJECTION, SOLUTION INTRAVENOUS EVERY 8 HOURS PRN
Status: DISCONTINUED | OUTPATIENT
Start: 2025-08-13 | End: 2025-08-15 | Stop reason: HOSPADM

## 2025-08-13 RX ORDER — POLYETHYLENE GLYCOL 3350 17 G/17G
17 POWDER, FOR SOLUTION ORAL 2 TIMES DAILY PRN
Status: DISCONTINUED | OUTPATIENT
Start: 2025-08-13 | End: 2025-08-15 | Stop reason: HOSPADM

## 2025-08-13 RX ORDER — ALLOPURINOL 100 MG/1
100 TABLET ORAL DAILY
Status: DISCONTINUED | OUTPATIENT
Start: 2025-08-14 | End: 2025-08-15 | Stop reason: HOSPADM

## 2025-08-13 RX ORDER — ENOXAPARIN SODIUM 100 MG/ML
30 INJECTION SUBCUTANEOUS EVERY 24 HOURS
Status: DISCONTINUED | OUTPATIENT
Start: 2025-08-13 | End: 2025-08-15 | Stop reason: HOSPADM

## 2025-08-13 RX ORDER — SODIUM CHLORIDE 0.9 % (FLUSH) 0.9 %
10 SYRINGE (ML) INJECTION
Status: DISCONTINUED | OUTPATIENT
Start: 2025-08-13 | End: 2025-08-15 | Stop reason: HOSPADM

## 2025-08-13 RX ORDER — PRAVASTATIN SODIUM 20 MG/1
20 TABLET ORAL DAILY
Status: DISCONTINUED | OUTPATIENT
Start: 2025-08-14 | End: 2025-08-15 | Stop reason: HOSPADM

## 2025-08-13 RX ORDER — SODIUM CHLORIDE 9 MG/ML
1000 INJECTION, SOLUTION INTRAVENOUS
Status: DISCONTINUED | OUTPATIENT
Start: 2025-08-13 | End: 2025-08-13

## 2025-08-13 RX ORDER — CALCIUM CARBONATE 200(500)MG
1 TABLET,CHEWABLE ORAL 3 TIMES DAILY PRN
Status: DISCONTINUED | OUTPATIENT
Start: 2025-08-13 | End: 2025-08-15 | Stop reason: HOSPADM

## 2025-08-13 RX ORDER — ONDANSETRON 8 MG/1
8 TABLET, ORALLY DISINTEGRATING ORAL EVERY 8 HOURS PRN
Status: DISCONTINUED | OUTPATIENT
Start: 2025-08-13 | End: 2025-08-15 | Stop reason: HOSPADM

## 2025-08-13 RX ORDER — ACETAMINOPHEN 325 MG/1
650 TABLET ORAL EVERY 4 HOURS PRN
Status: DISCONTINUED | OUTPATIENT
Start: 2025-08-13 | End: 2025-08-15 | Stop reason: HOSPADM

## 2025-08-13 RX ORDER — TALC
6 POWDER (GRAM) TOPICAL NIGHTLY PRN
Status: DISCONTINUED | OUTPATIENT
Start: 2025-08-13 | End: 2025-08-15 | Stop reason: HOSPADM

## 2025-08-13 RX ORDER — ENOXAPARIN SODIUM 100 MG/ML
40 INJECTION SUBCUTANEOUS EVERY 24 HOURS
Status: DISCONTINUED | OUTPATIENT
Start: 2025-08-13 | End: 2025-08-13

## 2025-08-13 RX ORDER — PYRIDOXINE HCL (VITAMIN B6) 50 MG
250 TABLET ORAL DAILY
Status: DISCONTINUED | OUTPATIENT
Start: 2025-08-14 | End: 2025-08-15 | Stop reason: HOSPADM

## 2025-08-13 RX ORDER — SODIUM CHLORIDE, SODIUM LACTATE, POTASSIUM CHLORIDE, CALCIUM CHLORIDE 600; 310; 30; 20 MG/100ML; MG/100ML; MG/100ML; MG/100ML
INJECTION, SOLUTION INTRAVENOUS CONTINUOUS
Status: DISCONTINUED | OUTPATIENT
Start: 2025-08-13 | End: 2025-08-14

## 2025-08-13 RX ORDER — HYDROCODONE BITARTRATE AND ACETAMINOPHEN 5; 325 MG/1; MG/1
1 TABLET ORAL EVERY 4 HOURS PRN
Refills: 0 | Status: DISCONTINUED | OUTPATIENT
Start: 2025-08-13 | End: 2025-08-13

## 2025-08-13 RX ORDER — AMOXICILLIN 250 MG
1 CAPSULE ORAL 2 TIMES DAILY
Status: DISCONTINUED | OUTPATIENT
Start: 2025-08-13 | End: 2025-08-15 | Stop reason: HOSPADM

## 2025-08-13 RX ORDER — HYDROCODONE BITARTRATE AND ACETAMINOPHEN 5; 325 MG/1; MG/1
1 TABLET ORAL EVERY 8 HOURS PRN
Refills: 0 | Status: DISCONTINUED | OUTPATIENT
Start: 2025-08-13 | End: 2025-08-15 | Stop reason: HOSPADM

## 2025-08-13 RX ADMIN — ENOXAPARIN SODIUM 30 MG: 30 INJECTION SUBCUTANEOUS at 09:08

## 2025-08-13 RX ADMIN — SODIUM CHLORIDE, POTASSIUM CHLORIDE, SODIUM LACTATE AND CALCIUM CHLORIDE 500 ML: 600; 310; 30; 20 INJECTION, SOLUTION INTRAVENOUS at 04:08

## 2025-08-13 RX ADMIN — SODIUM CHLORIDE, POTASSIUM CHLORIDE, SODIUM LACTATE AND CALCIUM CHLORIDE: 600; 310; 30; 20 INJECTION, SOLUTION INTRAVENOUS at 09:08

## 2025-08-13 RX ADMIN — IOHEXOL 100 ML: 350 INJECTION, SOLUTION INTRAVENOUS at 06:08

## 2025-08-14 PROBLEM — M48.061 LUMBAR SPINAL STENOSIS: Status: ACTIVE | Noted: 2025-08-13

## 2025-08-14 PROBLEM — E87.6 HYPOKALEMIA: Status: ACTIVE | Noted: 2025-08-14

## 2025-08-14 PROBLEM — Z71.89 ACP (ADVANCE CARE PLANNING): Status: ACTIVE | Noted: 2025-08-14

## 2025-08-14 PROBLEM — I50.33 ACUTE ON CHRONIC DIASTOLIC HEART FAILURE: Status: ACTIVE | Noted: 2025-08-14

## 2025-08-14 PROBLEM — E83.42 HYPOMAGNESEMIA: Status: ACTIVE | Noted: 2025-08-14

## 2025-08-14 PROBLEM — E43 SEVERE MALNUTRITION: Status: ACTIVE | Noted: 2025-08-14

## 2025-08-14 LAB
ABSOLUTE EOSINOPHIL (OHS): 0.1 K/UL
ABSOLUTE MONOCYTE (OHS): 1.05 K/UL (ref 0.3–1)
ABSOLUTE NEUTROPHIL COUNT (OHS): 4.35 K/UL (ref 1.8–7.7)
ALBUMIN SERPL BCP-MCNC: 3 G/DL (ref 3.5–5.2)
ALP SERPL-CCNC: 94 UNIT/L (ref 40–150)
ALT SERPL W/O P-5'-P-CCNC: 13 UNIT/L (ref 10–44)
ANION GAP (OHS): 9 MMOL/L (ref 8–16)
AORTIC ROOT ANNULUS: 2.6 CM
AORTIC SIZE INDEX (SOV): 1.8 CM/M2
AORTIC SIZE INDEX: 1.6 CM/M2
ASCENDING AORTA: 2.7 CM
AST SERPL-CCNC: 29 UNIT/L (ref 11–45)
AV INDEX (PROSTH): 0.47
AV MEAN GRADIENT: 8 MMHG
AV PEAK GRADIENT: 14 MMHG
AV REGURGITATION PRESSURE HALF TIME: 561 MS
AV VALVE AREA BY VELOCITY RATIO: 1.3 CM²
AV VALVE AREA: 1.3 CM²
AV VELOCITY RATIO: 0.47
BASOPHILS # BLD AUTO: 0.09 K/UL
BASOPHILS NFR BLD AUTO: 1.2 %
BILIRUB SERPL-MCNC: 1.6 MG/DL (ref 0.1–1)
BSA FOR ECHO PROCEDURE: 1.7 M2
BUN SERPL-MCNC: 28 MG/DL (ref 10–30)
CALCIUM SERPL-MCNC: 8.9 MG/DL (ref 8.7–10.5)
CHLORIDE SERPL-SCNC: 105 MMOL/L (ref 95–110)
CO2 SERPL-SCNC: 25 MMOL/L (ref 23–29)
CREAT SERPL-MCNC: 1.8 MG/DL (ref 0.5–1.4)
CV ECHO LV RWT: 0.68 CM
DOP CALC AO PEAK VEL: 1.9 M/S
DOP CALC AO VTI: 43.8 CM
DOP CALC LVOT AREA: 2.8 CM2
DOP CALC LVOT DIAMETER: 1.9 CM
DOP CALC LVOT PEAK VEL: 0.9 M/S
DOP CALCLVOT PEAK VEL VTI: 20.4 CM
E WAVE DECELERATION TIME: 318 MSEC
E/A RATIO: 1.5
E/E' RATIO: 12 M/S
ECHO LV POSTERIOR WALL: 1.5 CM (ref 0.6–1.1)
ERYTHROCYTE [DISTWIDTH] IN BLOOD BY AUTOMATED COUNT: 14.8 % (ref 11.5–14.5)
FRACTIONAL SHORTENING: 27.3 % (ref 28–44)
GFR SERPLBLD CREATININE-BSD FMLA CKD-EPI: 35 ML/MIN/1.73/M2
GLUCOSE SERPL-MCNC: 86 MG/DL (ref 70–110)
HCT VFR BLD AUTO: 36.8 % (ref 40–54)
HGB BLD-MCNC: 12.2 GM/DL (ref 14–18)
HOLD SPECIMEN: NORMAL
IMM GRANULOCYTES # BLD AUTO: 0.04 K/UL (ref 0–0.04)
IMM GRANULOCYTES NFR BLD AUTO: 0.5 % (ref 0–0.5)
INTERVENTRICULAR SEPTUM: 1.4 CM (ref 0.6–1.1)
LA MAJOR: 6.4 CM
LA MINOR: 5.9 CM
LA WIDTH: 4.1 CM
LAAS-AP2: 26 CM2
LAAS-AP4: 29 CM2
LACTATE SERPL-SCNC: 1.9 MMOL/L (ref 0.5–2.2)
LEFT ATRIUM AREA SYSTOLIC (APICAL 2 CHAMBER): 26.13 CM2
LEFT ATRIUM AREA SYSTOLIC (APICAL 4 CHAMBER): 29.06 CM2
LEFT ATRIUM SIZE: 3.2 CM
LEFT ATRIUM VOLUME INDEX MOD: 54 ML/M2
LEFT ATRIUM VOLUME INDEX: 40 ML/M2
LEFT ATRIUM VOLUME MOD: 93 ML
LEFT ATRIUM VOLUME: 68 CM3
LEFT INTERNAL DIMENSION IN SYSTOLE: 3.2 CM (ref 2.1–4)
LEFT VENTRICLE DIASTOLIC VOLUME INDEX: 52.63 ML/M2
LEFT VENTRICLE DIASTOLIC VOLUME: 90 ML
LEFT VENTRICLE END SYSTOLIC VOLUME APICAL 2 CHAMBER: 85.19 ML
LEFT VENTRICLE END SYSTOLIC VOLUME APICAL 4 CHAMBER: 87.1 ML
LEFT VENTRICLE MASS INDEX: 148.2 G/M2
LEFT VENTRICLE SYSTOLIC VOLUME INDEX: 23.4 ML/M2
LEFT VENTRICLE SYSTOLIC VOLUME: 40 ML
LEFT VENTRICULAR INTERNAL DIMENSION IN DIASTOLE: 4.4 CM (ref 3.5–6)
LEFT VENTRICULAR MASS: 253.4 G
LV LATERAL E/E' RATIO: 12 M/S
LV SEPTAL E/E' RATIO: 12 M/S
LVED V (TEICH): 89.75 ML
LVES V (TEICH): 40.49 ML
LVOT MG: 1.67 MMHG
LVOT MV: 0.57 CM/S
LYMPHOCYTES # BLD AUTO: 2.17 K/UL (ref 1–4.8)
Lab: 1.6 CM/M
Lab: 1.8 CM/M
MAGNESIUM SERPL-MCNC: 1.5 MG/DL (ref 1.6–2.6)
MCH RBC QN AUTO: 29.3 PG (ref 27–31)
MCHC RBC AUTO-ENTMCNC: 33.2 G/DL (ref 32–36)
MCV RBC AUTO: 89 FL (ref 82–98)
MV PEAK A VEL: 0.4 M/S
MV PEAK E VEL: 0.6 M/S
MV STENOSIS PRESSURE HALF TIME: 92.24 MS
MV VALVE AREA P 1/2 METHOD: 2.39 CM2
NUCLEATED RBC (/100WBC) (OHS): 0 /100 WBC
OHS CV CPX PATIENT HEIGHT IN: 67
OHS CV RV/LV RATIO: 0.43 CM
OHS QRS DURATION: 76 MS
OHS QTC CALCULATION: 477 MS
PISA AR MAX VEL: 4.42 M/S
PISA MRMAX VEL: 2.06 M/S
PISA TR MAX VEL: 2.7 M/S
PLATELET # BLD AUTO: 156 K/UL (ref 150–450)
PMV BLD AUTO: 10 FL (ref 9.2–12.9)
POTASSIUM SERPL-SCNC: 3.4 MMOL/L (ref 3.5–5.1)
PROT SERPL-MCNC: 5.7 GM/DL (ref 6–8.4)
PV MV: 0.62 M/S
PV PEAK GRADIENT: 3 MMHG
PV PEAK VELOCITY: 0.85 M/S
RA MAJOR: 5.47 CM
RA PRESSURE ESTIMATED: 3 MMHG
RA WIDTH: 3.9 CM
RBC # BLD AUTO: 4.16 M/UL (ref 4.6–6.2)
RELATIVE EOSINOPHIL (OHS): 1.3 %
RELATIVE LYMPHOCYTE (OHS): 27.8 % (ref 18–48)
RELATIVE MONOCYTE (OHS): 13.5 % (ref 4–15)
RELATIVE NEUTROPHIL (OHS): 55.7 % (ref 38–73)
RIGHT VENTRICLE DIASTOLIC BASEL DIMENSION: 1.9 CM
RIGHT VENTRICULAR END-DIASTOLIC DIMENSION: 1.85 CM
RV TB RVSP: 6 MMHG
RV TISSUE DOPPLER FREE WALL SYSTOLIC VELOCITY 1 (APICAL 4 CHAMBER VIEW): 13.42 CM/S
SINUS: 3.1 CM
SODIUM SERPL-SCNC: 139 MMOL/L (ref 136–145)
STJ: 3 CM
TDI LATERAL: 0.05 M/S
TDI SEPTAL: 0.05 M/S
TDI: 0.05 M/S
TR MAX PG: 30 MMHG
TRICUSPID ANNULAR PLANE SYSTOLIC EXCURSION: 1.6 CM
TROPONIN I SERPL HS-MCNC: 336 NG/L
TV REST PULMONARY ARTERY PRESSURE: 32 MMHG
WBC # BLD AUTO: 7.8 K/UL (ref 3.9–12.7)
Z-SCORE OF LEFT VENTRICULAR DIMENSION IN END DIASTOLE: -0.76
Z-SCORE OF LEFT VENTRICULAR DIMENSION IN END SYSTOLE: 0.67

## 2025-08-14 PROCEDURE — 63600175 PHARM REV CODE 636 W HCPCS: Performed by: HOSPITALIST

## 2025-08-14 PROCEDURE — 93010 ELECTROCARDIOGRAM REPORT: CPT | Mod: ,,, | Performed by: INTERNAL MEDICINE

## 2025-08-14 PROCEDURE — 84484 ASSAY OF TROPONIN QUANT: CPT | Performed by: NURSE PRACTITIONER

## 2025-08-14 PROCEDURE — 97530 THERAPEUTIC ACTIVITIES: CPT

## 2025-08-14 PROCEDURE — 97165 OT EVAL LOW COMPLEX 30 MIN: CPT

## 2025-08-14 PROCEDURE — 99223 1ST HOSP IP/OBS HIGH 75: CPT | Mod: 25,,, | Performed by: STUDENT IN AN ORGANIZED HEALTH CARE EDUCATION/TRAINING PROGRAM

## 2025-08-14 PROCEDURE — 63600175 PHARM REV CODE 636 W HCPCS: Performed by: NURSE PRACTITIONER

## 2025-08-14 PROCEDURE — 99222 1ST HOSP IP/OBS MODERATE 55: CPT | Mod: 25,,, | Performed by: INTERNAL MEDICINE

## 2025-08-14 PROCEDURE — 93005 ELECTROCARDIOGRAM TRACING: CPT

## 2025-08-14 PROCEDURE — 82040 ASSAY OF SERUM ALBUMIN: CPT | Performed by: NURSE PRACTITIONER

## 2025-08-14 PROCEDURE — 85025 COMPLETE CBC W/AUTO DIFF WBC: CPT | Performed by: NURSE PRACTITIONER

## 2025-08-14 PROCEDURE — 97162 PT EVAL MOD COMPLEX 30 MIN: CPT

## 2025-08-14 PROCEDURE — 25000003 PHARM REV CODE 250: Performed by: NURSE PRACTITIONER

## 2025-08-14 PROCEDURE — 25000003 PHARM REV CODE 250: Performed by: HOSPITALIST

## 2025-08-14 PROCEDURE — 99498 ADVNCD CARE PLAN ADDL 30 MIN: CPT | Mod: ,,, | Performed by: STUDENT IN AN ORGANIZED HEALTH CARE EDUCATION/TRAINING PROGRAM

## 2025-08-14 PROCEDURE — 83605 ASSAY OF LACTIC ACID: CPT | Performed by: HOSPITALIST

## 2025-08-14 PROCEDURE — 21400001 HC TELEMETRY ROOM

## 2025-08-14 PROCEDURE — 83735 ASSAY OF MAGNESIUM: CPT | Performed by: NURSE PRACTITIONER

## 2025-08-14 PROCEDURE — 36415 COLL VENOUS BLD VENIPUNCTURE: CPT | Performed by: NURSE PRACTITIONER

## 2025-08-14 PROCEDURE — 99497 ADVNCD CARE PLAN 30 MIN: CPT | Mod: 25,,, | Performed by: STUDENT IN AN ORGANIZED HEALTH CARE EDUCATION/TRAINING PROGRAM

## 2025-08-14 PROCEDURE — 36415 COLL VENOUS BLD VENIPUNCTURE: CPT | Performed by: HOSPITALIST

## 2025-08-14 RX ORDER — MAGNESIUM SULFATE HEPTAHYDRATE 40 MG/ML
2 INJECTION, SOLUTION INTRAVENOUS ONCE
Status: COMPLETED | OUTPATIENT
Start: 2025-08-14 | End: 2025-08-14

## 2025-08-14 RX ORDER — HYDROCODONE BITARTRATE AND ACETAMINOPHEN 10; 325 MG/1; MG/1
1 TABLET ORAL EVERY 8 HOURS PRN
Refills: 0 | Status: DISCONTINUED | OUTPATIENT
Start: 2025-08-14 | End: 2025-08-15 | Stop reason: HOSPADM

## 2025-08-14 RX ORDER — POTASSIUM CHLORIDE 750 MG/1
30 TABLET, EXTENDED RELEASE ORAL ONCE
Status: COMPLETED | OUTPATIENT
Start: 2025-08-14 | End: 2025-08-14

## 2025-08-14 RX ORDER — FUROSEMIDE 10 MG/ML
40 INJECTION INTRAMUSCULAR; INTRAVENOUS EVERY 12 HOURS
Status: DISCONTINUED | OUTPATIENT
Start: 2025-08-14 | End: 2025-08-15

## 2025-08-14 RX ADMIN — PRAVASTATIN SODIUM 20 MG: 20 TABLET ORAL at 12:08

## 2025-08-14 RX ADMIN — SENNOSIDES, DOCUSATE SODIUM 1 TABLET: 50; 8.6 TABLET, FILM COATED ORAL at 09:08

## 2025-08-14 RX ADMIN — ENOXAPARIN SODIUM 30 MG: 30 INJECTION SUBCUTANEOUS at 06:08

## 2025-08-14 RX ADMIN — CYANOCOBALAMIN TAB 250 MCG 250 MCG: 250 TAB at 12:08

## 2025-08-14 RX ADMIN — SENNOSIDES, DOCUSATE SODIUM 1 TABLET: 50; 8.6 TABLET, FILM COATED ORAL at 12:08

## 2025-08-14 RX ADMIN — FUROSEMIDE 40 MG: 10 INJECTION, SOLUTION INTRAVENOUS at 12:08

## 2025-08-14 RX ADMIN — MAGNESIUM SULFATE HEPTAHYDRATE 2 G: 40 INJECTION, SOLUTION INTRAVENOUS at 12:08

## 2025-08-14 RX ADMIN — FUROSEMIDE 40 MG: 10 INJECTION, SOLUTION INTRAVENOUS at 09:08

## 2025-08-14 RX ADMIN — POTASSIUM CHLORIDE 30 MEQ: 750 TABLET, EXTENDED RELEASE ORAL at 12:08

## 2025-08-14 RX ADMIN — ALLOPURINOL 100 MG: 100 TABLET ORAL at 12:08

## 2025-08-15 VITALS
HEART RATE: 58 BPM | WEIGHT: 134.94 LBS | RESPIRATION RATE: 16 BRPM | BODY MASS INDEX: 21.18 KG/M2 | DIASTOLIC BLOOD PRESSURE: 66 MMHG | SYSTOLIC BLOOD PRESSURE: 147 MMHG | HEIGHT: 67 IN | TEMPERATURE: 98 F | OXYGEN SATURATION: 98 %

## 2025-08-15 LAB
ABSOLUTE EOSINOPHIL (OHS): 0.19 K/UL
ABSOLUTE MONOCYTE (OHS): 0.94 K/UL (ref 0.3–1)
ABSOLUTE NEUTROPHIL COUNT (OHS): 3.43 K/UL (ref 1.8–7.7)
ANION GAP (OHS): 10 MMOL/L (ref 8–16)
BASOPHILS # BLD AUTO: 0.09 K/UL
BASOPHILS NFR BLD AUTO: 1.4 %
BUN SERPL-MCNC: 31 MG/DL (ref 10–30)
CALCIUM SERPL-MCNC: 9.1 MG/DL (ref 8.7–10.5)
CHLORIDE SERPL-SCNC: 105 MMOL/L (ref 95–110)
CO2 SERPL-SCNC: 27 MMOL/L (ref 23–29)
CREAT SERPL-MCNC: 1.9 MG/DL (ref 0.5–1.4)
ERYTHROCYTE [DISTWIDTH] IN BLOOD BY AUTOMATED COUNT: 14.8 % (ref 11.5–14.5)
GFR SERPLBLD CREATININE-BSD FMLA CKD-EPI: 33 ML/MIN/1.73/M2
GLUCOSE SERPL-MCNC: 84 MG/DL (ref 70–110)
HCT VFR BLD AUTO: 38.8 % (ref 40–54)
HGB BLD-MCNC: 12.4 GM/DL (ref 14–18)
IMM GRANULOCYTES # BLD AUTO: 0.02 K/UL (ref 0–0.04)
IMM GRANULOCYTES NFR BLD AUTO: 0.3 % (ref 0–0.5)
LYMPHOCYTES # BLD AUTO: 1.91 K/UL (ref 1–4.8)
MAGNESIUM SERPL-MCNC: 2 MG/DL (ref 1.6–2.6)
MCH RBC QN AUTO: 28.5 PG (ref 27–31)
MCHC RBC AUTO-ENTMCNC: 32 G/DL (ref 32–36)
MCV RBC AUTO: 89 FL (ref 82–98)
NUCLEATED RBC (/100WBC) (OHS): 0 /100 WBC
OHS QRS DURATION: 76 MS
OHS QRS DURATION: 82 MS
OHS QTC CALCULATION: 457 MS
OHS QTC CALCULATION: 467 MS
PLATELET # BLD AUTO: 154 K/UL (ref 150–450)
PMV BLD AUTO: 10.2 FL (ref 9.2–12.9)
POTASSIUM SERPL-SCNC: 3.4 MMOL/L (ref 3.5–5.1)
RBC # BLD AUTO: 4.35 M/UL (ref 4.6–6.2)
RELATIVE EOSINOPHIL (OHS): 2.9 %
RELATIVE LYMPHOCYTE (OHS): 29 % (ref 18–48)
RELATIVE MONOCYTE (OHS): 14.3 % (ref 4–15)
RELATIVE NEUTROPHIL (OHS): 52.1 % (ref 38–73)
SODIUM SERPL-SCNC: 142 MMOL/L (ref 136–145)
WBC # BLD AUTO: 6.58 K/UL (ref 3.9–12.7)

## 2025-08-15 PROCEDURE — 99497 ADVNCD CARE PLAN 30 MIN: CPT | Mod: 25,,, | Performed by: STUDENT IN AN ORGANIZED HEALTH CARE EDUCATION/TRAINING PROGRAM

## 2025-08-15 PROCEDURE — 83735 ASSAY OF MAGNESIUM: CPT | Performed by: HOSPITALIST

## 2025-08-15 PROCEDURE — 85025 COMPLETE CBC W/AUTO DIFF WBC: CPT | Performed by: HOSPITALIST

## 2025-08-15 PROCEDURE — 99233 SBSQ HOSP IP/OBS HIGH 50: CPT | Mod: 25,,, | Performed by: STUDENT IN AN ORGANIZED HEALTH CARE EDUCATION/TRAINING PROGRAM

## 2025-08-15 PROCEDURE — 93010 ELECTROCARDIOGRAM REPORT: CPT | Mod: ,,, | Performed by: INTERNAL MEDICINE

## 2025-08-15 PROCEDURE — 36415 COLL VENOUS BLD VENIPUNCTURE: CPT | Performed by: HOSPITALIST

## 2025-08-15 PROCEDURE — 93005 ELECTROCARDIOGRAM TRACING: CPT

## 2025-08-15 PROCEDURE — 97535 SELF CARE MNGMENT TRAINING: CPT

## 2025-08-15 PROCEDURE — 25000003 PHARM REV CODE 250: Performed by: HOSPITALIST

## 2025-08-15 PROCEDURE — 25000003 PHARM REV CODE 250: Performed by: NURSE PRACTITIONER

## 2025-08-15 PROCEDURE — 97116 GAIT TRAINING THERAPY: CPT

## 2025-08-15 PROCEDURE — 80048 BASIC METABOLIC PNL TOTAL CA: CPT | Performed by: HOSPITALIST

## 2025-08-15 RX ORDER — POTASSIUM CHLORIDE 750 MG/1
30 TABLET, EXTENDED RELEASE ORAL ONCE
Status: COMPLETED | OUTPATIENT
Start: 2025-08-15 | End: 2025-08-15

## 2025-08-15 RX ORDER — HYDROCODONE BITARTRATE AND ACETAMINOPHEN 5; 325 MG/1; MG/1
TABLET ORAL
Qty: 30 TABLET | Refills: 0 | Status: SHIPPED | OUTPATIENT
Start: 2025-08-15

## 2025-08-15 RX ADMIN — PRAVASTATIN SODIUM 20 MG: 20 TABLET ORAL at 08:08

## 2025-08-15 RX ADMIN — SENNOSIDES, DOCUSATE SODIUM 1 TABLET: 50; 8.6 TABLET, FILM COATED ORAL at 08:08

## 2025-08-15 RX ADMIN — ALLOPURINOL 100 MG: 100 TABLET ORAL at 08:08

## 2025-08-15 RX ADMIN — POTASSIUM CHLORIDE 30 MEQ: 750 TABLET, EXTENDED RELEASE ORAL at 08:08

## 2025-08-15 RX ADMIN — CYANOCOBALAMIN TAB 250 MCG 250 MCG: 250 TAB at 08:08

## 2025-08-16 LAB — HOLD SPECIMEN: NORMAL

## 2025-08-17 ENCOUNTER — PATIENT MESSAGE (OUTPATIENT)
Dept: INTERNAL MEDICINE | Facility: CLINIC | Age: OVER 89
End: 2025-08-17
Payer: MEDICARE

## 2025-08-17 DIAGNOSIS — I10 ESSENTIAL HYPERTENSION: ICD-10-CM

## 2025-08-18 RX ORDER — LISINOPRIL 20 MG/1
20 TABLET ORAL DAILY
Qty: 90 TABLET | Refills: 3 | Status: SHIPPED | OUTPATIENT
Start: 2025-08-18

## (undated) DEVICE — ELECTRODE REM PLYHSV RETURN 9

## (undated) DEVICE — APPLICATOR CHLORAPREP ORN 26ML

## (undated) DEVICE — SUT ETHIBOND 0 CR/CT-2 8-18

## (undated) DEVICE — GOWN SMART IMP BREATHABLE XXLG

## (undated) DEVICE — DRAIN PENROSE XRAY 18 X 1/4 ST

## (undated) DEVICE — SUT MCRYL PLUS 4-0 PS2 27IN

## (undated) DEVICE — SEE MEDLINE ITEM 156930

## (undated) DEVICE — GLOVE BIOGEL SKINSENSE PI 6.5

## (undated) DEVICE — SEE MEDLINE ITEM 157148

## (undated) DEVICE — GLOVE BIOGEL SKINSENSE PI 6.0

## (undated) DEVICE — TOWEL OR DISP STRL BLUE 4/PK

## (undated) DEVICE — SEALER LIGASURE CRV 18.8CM

## (undated) DEVICE — SYS CLSR DERMABOND PRINEO 22CM

## (undated) DEVICE — SOL POVIDONE SCRUB IODINE 4 OZ

## (undated) DEVICE — ELECTRODE BLD EXT INSUL 1

## (undated) DEVICE — SUT VICRYL PLUS 3-0 18IN

## (undated) DEVICE — DRAIN PENRS SIL STRL .25X18IN

## (undated) DEVICE — GLOVE SENSICARE PI MICRO 6.5

## (undated) DEVICE — NDL HYPO REG 25G X 1 1/2

## (undated) DEVICE — SYR B-D DISP CONTROL 10CC100/C

## (undated) DEVICE — NDL ECLIPSE SAFETY 23G 1.5IN

## (undated) DEVICE — SUT VICRYL PLUS 3-0 SH 18IN

## (undated) DEVICE — SYS PRINEO SKIN CLOSURE

## (undated) DEVICE — DECANTER VIAL ASEPTIC TRANSFER

## (undated) DEVICE — GLOVE BIOGEL SKINSENSE PI 8.0

## (undated) DEVICE — Device

## (undated) DEVICE — GLOVE SENSICARE PI MICRO 8

## (undated) DEVICE — SEE MEDLINE ITEM 152622

## (undated) DEVICE — DRAIN PENROSE XRAY 12 X 1/4 ST

## (undated) DEVICE — DRAPE LAP T SHT W/ INSTR PAD

## (undated) DEVICE — DECANTER 6 VIAL

## (undated) DEVICE — SPONGE LAP 18X18 PREWASHED

## (undated) DEVICE — ELECTRODE BLADE TEFLON 6